# Patient Record
Sex: FEMALE | Race: WHITE | Employment: PART TIME | URBAN - METROPOLITAN AREA
[De-identification: names, ages, dates, MRNs, and addresses within clinical notes are randomized per-mention and may not be internally consistent; named-entity substitution may affect disease eponyms.]

---

## 2017-01-11 ENCOUNTER — OFFICE VISIT (OUTPATIENT)
Dept: FAMILY MEDICINE | Facility: CLINIC | Age: 27
End: 2017-01-11
Payer: COMMERCIAL

## 2017-01-11 VITALS
HEART RATE: 80 BPM | TEMPERATURE: 97.9 F | DIASTOLIC BLOOD PRESSURE: 72 MMHG | BODY MASS INDEX: 20.66 KG/M2 | SYSTOLIC BLOOD PRESSURE: 110 MMHG | WEIGHT: 113 LBS

## 2017-01-11 DIAGNOSIS — Z23 NEED FOR PROPHYLACTIC VACCINATION AND INOCULATION AGAINST INFLUENZA: ICD-10-CM

## 2017-01-11 DIAGNOSIS — L30.9 DERMATITIS: Primary | ICD-10-CM

## 2017-01-11 PROCEDURE — 90471 IMMUNIZATION ADMIN: CPT | Performed by: PHYSICIAN ASSISTANT

## 2017-01-11 PROCEDURE — 99213 OFFICE O/P EST LOW 20 MIN: CPT | Mod: 25 | Performed by: PHYSICIAN ASSISTANT

## 2017-01-11 PROCEDURE — 90686 IIV4 VACC NO PRSV 0.5 ML IM: CPT | Performed by: PHYSICIAN ASSISTANT

## 2017-01-11 RX ORDER — TRIAMCINOLONE ACETONIDE 1 MG/G
CREAM TOPICAL
Qty: 30 G | Refills: 0 | Status: SHIPPED | OUTPATIENT
Start: 2017-01-11 | End: 2017-12-19

## 2017-01-11 ASSESSMENT — PATIENT HEALTH QUESTIONNAIRE - PHQ9: 5. POOR APPETITE OR OVEREATING: SEVERAL DAYS

## 2017-01-11 ASSESSMENT — ANXIETY QUESTIONNAIRES
GAD7 TOTAL SCORE: 7
7. FEELING AFRAID AS IF SOMETHING AWFUL MIGHT HAPPEN: MORE THAN HALF THE DAYS
1. FEELING NERVOUS, ANXIOUS, OR ON EDGE: SEVERAL DAYS
3. WORRYING TOO MUCH ABOUT DIFFERENT THINGS: SEVERAL DAYS
2. NOT BEING ABLE TO STOP OR CONTROL WORRYING: SEVERAL DAYS
5. BEING SO RESTLESS THAT IT IS HARD TO SIT STILL: SEVERAL DAYS
6. BECOMING EASILY ANNOYED OR IRRITABLE: NOT AT ALL

## 2017-01-11 NOTE — PROGRESS NOTES
SUBJECTIVE:                                                    Stacie Daniels is a 26 year old female who presents to clinic today for the following health issues:      x 6  months right side bump on head.   Stacie has a dry patch on the posterior scalp. It has been present x months. No increase in size, no drainage or pain.   She has now developed a lump in the neck also.       Problem list and histories reviewed & adjusted, as indicated.  Additional history: as documented    Patient Active Problem List   Diagnosis     CARDIOVASCULAR SCREENING; LDL GOAL LESS THAN 160     ADHD (attention deficit hyperactivity disorder)     Cervical dysplasia     Supervision of normal first pregnancy     Generalized anxiety disorder     Sciatica     Hyperemesis arising during pregnancy     PTSD (post-traumatic stress disorder)     Panic attacks     Past Surgical History   Procedure Laterality Date     Foreign body removal       from her eye       Social History   Substance Use Topics     Smoking status: Former Smoker     Smokeless tobacco: Never Used      Comment: quit with pregnancy     Alcohol Use: Yes      Comment: socially- quit with pregnancy     Family History   Problem Relation Age of Onset     C.A.D. Father      CANCER Father      lymphoma     Prostate Cancer Father      CANCER Maternal Grandmother      rectal     Depression Maternal Grandmother      Prostate Cancer Maternal Grandfather      Breast Cancer Paternal Grandmother      Hypertension Paternal Grandmother      Depression Paternal Grandmother      Thyroid Disease Paternal Grandmother      Hypertension Paternal Grandfather      Depression Mother      Bipolar Disorder Mother      Depression Sister      Bipolar Disorder Sister      Depression Sister      DIABETES No family hx of      CEREBROVASCULAR DISEASE No family hx of      Glaucoma No family hx of      Macular Degeneration No family hx of      Schizophrenia Maternal Uncle      Suicide Paternal Aunt           Allergies   Allergen Reactions     Augmentin Hives     Avocado Hives and Swelling       ROS:  Constitutional, HEENT, cardiovascular, pulmonary, gi and gu systems are negative, except as otherwise noted.    OBJECTIVE:                                                    /72 mmHg  Pulse 80  Temp(Src) 97.9  F (36.6  C) (Oral)  Wt 113 lb (51.256 kg)  Body mass index is 20.66 kg/(m^2).  GENERAL: healthy, alert and no distress  SKIN: small dry patch on the posterior scalp, there is no drainage or evidence of a cyst / infected follicle. There is a small reactive lymphnode in the posterior neck below the dry patch.   PSYCH: mentation appears normal, affect normal/bright    Diagnostic Test Results:  none      ASSESSMENT/PLAN:                                                      1. Dermatitis  She is going to use the steroid cream for a few weeks. No current signs of infection.   The inflammation is causing the reactive lymphnode.   She will notify if symptoms persist.   - triamcinolone (KENALOG) 0.1 % cream; Apply sparingly to affected area three times daily for 14 days.  Dispense: 30 g; Refill: 0    2. Need for prophylactic vaccination and inoculation against influenza  - FLU VAC, SPLIT VIRUS IM > 3 YO (QUADRIVALENT) [80104]  - Vaccine Administration, Initial [92084]      Kristen M. Kehr, PA-C  [facility]  Injectable Influenza Immunization Documentation    1.  Is the person to be vaccinated sick today?  No    2. Does the person to be vaccinated have an allergy to eggs or to a component of the vaccine?  No    3. Has the person to be vaccinated today ever had a serious reaction to influenza vaccine in the past?  No    4. Has the person to be vaccinated ever had Guillain-Crowder syndrome?  No     Form completed by BRII Norman MA

## 2017-01-11 NOTE — MR AVS SNAPSHOT
After Visit Summary   1/11/2017    Stacie Daniels    MRN: [id number]           Patient Information     Date Of Birth          1990        Visit Information        Provider Department      1/11/2017 10:10 AM Kehr, Kristen M, PA-C [facility]        Today's Diagnoses     Need for prophylactic vaccination and inoculation against influenza    -  1     Rash and nonspecific skin eruption            Follow-ups after your visit        Additional Services     DERMATOLOGY REFERRAL       Your provider has referred you to: [facility]: [facility] [phone]   [email or web address]  [facility] [phone]   [email or web address]  [facility] ( locations) [phone]   [email or web address]    Please be aware that coverage of these services is subject to the terms and limitations of your health insurance plan.  Call member services at your health plan with any benefit or coverage questions.      Please bring the following with you to your appointment:    (1) Any X-Rays, CTs or MRIs which have been performed.  Contact the facility where they were done to arrange for  prior to your scheduled appointment.    (2) List of current medications  (3) This referral request   (4) Any documents/labs given to you for this referral                  Who to contact     If you have questions or need follow up information about today's clinic visit or your schedule please contact [facility] directly at [phone].  Normal or non-critical lab and imaging results will be communicated to you by MyChart, letter or phone within 4 business days after the clinic has received the results. If you do not hear from us within 7 days, please contact the clinic through MyChart or phone. If you have a critical or abnormal lab result, we will notify you by phone as  soon as possible.  Submit refill requests through [employer] or call your pharmacy and they will forward the refill request to us. Please allow 3 business days for your refill to be completed.          Additional Information About Your Visit        [employer] Information     [employer] gives you secure access to your electronic health record. If you see a primary care provider, you can also send messages to your care team and make appointments. If you have questions, please call your primary care clinic.  If you do not have a primary care provider, please call [phone] and they will assist you.        Care EveryWhere ID     This is your Care EveryWhere ID. This could be used by other organizations to access your [place] medical records  [id number]        Your Vitals Were     Pulse Temperature                80 97.9  F (36.6  C) (Oral)           Blood Pressure from Last 3 Encounters:   01/11/17 110/72   04/09/16 104/66   04/07/15 117/73    Weight from Last 3 Encounters:   01/11/17 113 lb (51.256 kg)   04/09/16 110 lb (49.896 kg)   04/07/15 100 lb (45.36 kg)              We Performed the Following     DEPRESSION ACTION PLAN (DAP) Order [[id number]]     DERMATOLOGY REFERRAL     FLU VAC, SPLIT VIRUS IM > 3 YO (QUADRIVALENT) [73325]     Vaccine Administration, Initial [80193]          Today's Medication Changes          These changes are accurate as of: 1/11/17 10:40 AM.  If you have any questions, ask your nurse or doctor.               Start taking these medicines.        Dose/Directions    triamcinolone 0.1 % cream   Commonly known as:  KENALOG   Used for:  Rash and nonspecific skin eruption   Started by:  Kehr, Kristen M, PA-C        Apply sparingly to affected area three times daily for 14 days.   Quantity:  30 g   Refills:  0            Where to get your medicines      These medications were sent to [facility] [place], [place] - [address] [facility] [address], [address]  [address], [address], [facility] [address]      Phone:  [phone]    - triamcinolone 0.1 % cream             Primary Care Provider Office Phone # Fax #    Gerry Barcenas -[phone]       [facility] [address] [facility] [address]        Thank you!     Thank you for choosing [facility]  for your care. Our goal is always to provide you with excellent care. Hearing back from our patients is one way we can continue to improve our services. Please take a few minutes to complete the written survey that you may receive in the mail after your visit with us. Thank you!             Your Updated Medication List - Protect others around you: Learn how to safely use, store and throw away your medicines at www.disposemymeds.org.          This list is accurate as of: 1/11/17 10:40 AM.  Always use your most recent med list.                   Brand Name Dispense Instructions for use    triamcinolone 0.1 % cream    KENALOG    30 g    Apply sparingly to affected area three times daily for 14 days.

## 2017-01-11 NOTE — NURSING NOTE
"Chief Complaint   Patient presents with     Mass     x 6  months right side bump on head.        Initial /72 mmHg  Pulse 80  Temp(Src) 97.9  F (36.6  C) (Oral)  Wt 113 lb (51.256 kg) Estimated body mass index is 20.66 kg/(m^2) as calculated from the following:    Height as of 6/13/14: 5' 2.01\" (1.575 m).    Weight as of this encounter: 113 lb (51.256 kg)..  BP completed using cuff size: shruthi WEINBERG MA    "

## 2017-01-12 ASSESSMENT — PATIENT HEALTH QUESTIONNAIRE - PHQ9: SUM OF ALL RESPONSES TO PHQ QUESTIONS 1-9: 7

## 2017-01-12 ASSESSMENT — ANXIETY QUESTIONNAIRES: GAD7 TOTAL SCORE: 7

## 2017-01-25 ENCOUNTER — OFFICE VISIT (OUTPATIENT)
Dept: OPTOMETRY | Facility: CLINIC | Age: 27
End: 2017-01-25
Payer: COMMERCIAL

## 2017-01-25 DIAGNOSIS — H52.13 MYOPIA OF BOTH EYES: Primary | ICD-10-CM

## 2017-01-25 DIAGNOSIS — H52.203 ASTIGMATISM OF BOTH EYES: ICD-10-CM

## 2017-01-25 PROCEDURE — 92004 COMPRE OPH EXAM NEW PT 1/>: CPT | Performed by: OPTOMETRIST

## 2017-01-25 PROCEDURE — 92015 DETERMINE REFRACTIVE STATE: CPT | Performed by: OPTOMETRIST

## 2017-01-25 ASSESSMENT — REFRACTION_MANIFEST
OS_CYLINDER: +0.50
OS_SPHERE: -1.75
OD_CYLINDER: +0.50
OS_AXIS: 140
OD_AXIS: 19
OD_CYLINDER: +0.50
OD_AXIS: 020
METHOD_AUTOREFRACTION: 1
OD_SPHERE: -1.75
OS_SPHERE: -1.75
OS_AXIS: 126
OS_CYLINDER: +0.25
OD_SPHERE: -1.50

## 2017-01-25 ASSESSMENT — KERATOMETRY
OS_K2POWER_DIOPTERS: 45.25
OD_K1POWER_DIOPTERS: 45.00
OS_K1POWER_DIOPTERS: 44.75
OD_AXISANGLE2_DEGREES: 143
OD_K2POWER_DIOPTERS: 45.25
OS_AXISANGLE2_DEGREES: 5

## 2017-01-25 ASSESSMENT — CONF VISUAL FIELD
OD_NORMAL: 1
OS_NORMAL: 1

## 2017-01-25 ASSESSMENT — VISUAL ACUITY
OD_SC: 20/60
OD_SC+: -3
OS_PH_SC: 20/30-1
OD_SC: 20/20
OS_SC: 20/20
OD_PH_SC: 20/25-1
OS_SC+: -2
METHOD: SNELLEN - LINEAR
OS_SC: 20/60

## 2017-01-25 ASSESSMENT — CUP TO DISC RATIO
OS_RATIO: 0.3
OD_RATIO: 0.3

## 2017-01-25 ASSESSMENT — EXTERNAL EXAM - RIGHT EYE: OD_EXAM: NORMAL

## 2017-01-25 ASSESSMENT — EXTERNAL EXAM - LEFT EYE: OS_EXAM: NORMAL

## 2017-01-25 ASSESSMENT — TONOMETRY
IOP_METHOD: APPLANATION
OS_IOP_MMHG: 14
OD_IOP_MMHG: 14

## 2017-01-25 ASSESSMENT — SLIT LAMP EXAM - LIDS
COMMENTS: NORMAL
COMMENTS: NORMAL

## 2017-01-25 NOTE — PATIENT INSTRUCTIONS
Patient was advised of today's exam findings.  Fill glasses prescription  Return in 1 year for eye exam    Yumi Tran O.D.  [facility]   [address] [place], [place] [id number] [phone]

## 2017-01-25 NOTE — PROGRESS NOTES
Chief Complaint   Patient presents with     COMPREHENSIVE EYE EXAM     Needs new glasses          Last Eye Exam: 10/21/13  Dilated Previously: Yes    What are you currently using to see?  Patient has glasses that she wears when she drives. They are broken and cracked so she doesn't really wear them out.        Distance Vision Acuity: Vision isn't good in the distance, needs some glasses. Trouble driving at night.    Near Vision Acuity: Satisfied with vision while reading and using computer unaided    Eye Comfort: good  Do you use eye drops? : No  Occupation or Hobbies: , just graduated from [employer] in Sept, does make up for weddings    Bianca Apple Optometric Assistant          Medical, surgical and family histories reviewed and updated 1/25/2017.       OBJECTIVE: See Ophthalmology exam    ASSESSMENT:    ICD-10-CM    1. Myopia of both eyes H52.13    2. Astigmatism of both eyes H52.203       PLAN:     Patient Instructions   Patient was advised of today's exam findings.  Fill glasses prescription  Return in 1 year for eye exam    Yumi Tran O.D.  [facility]   [address] [place], [place] [id number] [phone]

## 2017-01-25 NOTE — MR AVS SNAPSHOT
After Visit Summary   1/25/2017    Stacie Daniels    MRN: [id number]           Patient Information     Date Of Birth          1990        Visit Information        Provider Department      1/25/2017 10:30 AM Yumi Tran OD [facility]        Today's Diagnoses     Myopia of both eyes    -  1     Astigmatism of both eyes           Care Instructions    Patient was advised of today's exam findings.  Fill glasses prescription  Return in 1 year for eye exam    Yuim Tran O.D.  [facility]   [address] [place] [address]  [phone]            Follow-ups after your visit        Who to contact     If you have questions or need follow up information about today's clinic visit or your schedule please contact [facility] directly at [phone].  Normal or non-critical lab and imaging results will be communicated to you by MyChart, letter or phone within 4 business days after the clinic has received the results. If you do not hear from us within 7 days, please contact the clinic through MyChart or phone. If you have a critical or abnormal lab result, we will notify you by phone as soon as possible.  Submit refill requests through [email or web address] or call your pharmacy and they will forward the refill request to us. Please allow 3 business days for your refill to be completed.          Additional Information About Your Visit        MyChart Information     [email or web address] gives you secure access to your electronic health record. If you see a primary care provider, you can also send messages to your care team and make appointments. If you have questions, please call your primary care clinic.  If you do not have a primary care provider, please call [phone] and they will assist you.        Care EveryWhere ID     This is your Care EveryWhere ID. This could be used by other organizations to access your [place] medical records  [id number]         Blood Pressure from  Last 3 Encounters:   01/11/17 110/72   04/09/16 104/66   04/07/15 117/73    Weight from Last 3 Encounters:   01/11/17 51.256 kg (113 lb)   04/09/16 49.896 kg (110 lb)   04/07/15 45.36 kg (100 lb)              Today, you had the following     No orders found for display       Primary Care Provider Office Phone # Fax #    Gerry Barcenas [phone]       [facility] [address] [facility] [address]        Thank you!     Thank you for choosing [facility]  for your care. Our goal is always to provide you with excellent care. Hearing back from our patients is one way we can continue to improve our services. Please take a few minutes to complete the written survey that you may receive in the mail after your visit with us. Thank you!             Your Updated Medication List - Protect others around you: Learn how to safely use, store and throw away your medicines at www.disposemymeds.org.          This list is accurate as of: 1/25/17 11:20 AM.  Always use your most recent med list.                   Brand Name Dispense Instructions for use    triamcinolone 0.1 % cream    KENALOG    30 g    Apply sparingly to affected area three times daily for 14 days.

## 2017-03-15 ENCOUNTER — TELEPHONE (OUTPATIENT)
Dept: FAMILY MEDICINE | Facility: CLINIC | Age: 27
End: 2017-03-15

## 2017-03-15 NOTE — TELEPHONE ENCOUNTER
Panel Management Review      Patient has the following on her problem list: None      Composite cancer screening  Chart review shows that this patient is due/due soon for the following Pap Smear  Summary:    Patient is due/failing the following:   PAP and PHYSICAL    Action needed:   Patient needs office visit for physical/pap.    Type of outreach:    Sent [employer] message.    Questions for provider review:    None                                                                                                                                    BRII Norman MA       Chart routed to close .

## 2017-07-29 ENCOUNTER — HEALTH MAINTENANCE LETTER (OUTPATIENT)
Age: 27
End: 2017-07-29

## 2017-08-12 ENCOUNTER — TRANSFERRED RECORDS (OUTPATIENT)
Dept: HEALTH INFORMATION MANAGEMENT | Facility: CLINIC | Age: 27
End: 2017-08-12

## 2017-12-18 NOTE — PROGRESS NOTES
SUBJECTIVE:                                                    Stacie Daniels is a 27 year old female who presents to clinic today for the following health issues:    Eye(s) Problem  Onset: x 5-6 months    Description:   Location: Both eyes  Pain: NONE  Redness: no    Accompanying Signs & Symptoms:  Discharge/mattering: no  Swelling: no  Visual changes: YES- Blurry vision from not wearing her prescribed glasses  Fever: no  Nasal Congestion: no  Bothered by bright lights: YES- little    History:   Trauma: no   Foreign body exposure: no    Precipitating factors:   Wearing contacts: no    Alleviating factors:  Improved by: None    Therapies Tried and outcome: none      Mom is a neuro-nurse and has noticed her eyes twitching. Horizontal. Bilateral. Pretty constant per patient. Patient can feel her eyes twitching she thinks.   Worse when trying to focus.  Has had dizziness off and on, describes as vasovagal or orthostatic never vertigo.  Never has LOC.  Usually is when she stands up from sitting (orthostatic).  Has not had labs in years.  No tinnitis. No hearing changes or hearing loss.   Last eye doctor visit was almost a year ago. This started after that visit. Patient has need for glasses but does not wear because she does not like the look of them. Wears glasses for driving only, needs them for distance mainll. Eyes still twitch when wearing glasses though she feels.  Never worn contacts.  Never had lasix.  Had glitter removed when younger from left eye, otherwise no procedures to eye.   No headaches or weakness or numbness/tingling. No rashes.     Pt is due for PAP, will schedule before she leaves today with fasting labs (has not had labs in years). H/o anemia with pregnancy.       Problem list and histories reviewed & adjusted, as indicated.  Additional history: as documented    Patient Active Problem List   Diagnosis     CARDIOVASCULAR SCREENING; LDL GOAL LESS THAN 160     ADHD (attention deficit hyperactivity  "disorder)     Cervical dysplasia     Supervision of normal first pregnancy     Generalized anxiety disorder     Sciatica     Hyperemesis arising during pregnancy     PTSD (post-traumatic stress disorder)     Panic attacks     Past Surgical History:   Procedure Laterality Date     FOREIGN BODY REMOVAL      from her eye       Social History   Substance Use Topics     Smoking status: Former Smoker     Smokeless tobacco: Never Used      Comment: quit with pregnancy     Alcohol use Yes      Comment: socially- quit with pregnancy     Family History   Problem Relation Age of Onset     C.A.D. Father      CANCER Father      lymphoma     Prostate Cancer Father      CANCER Maternal Grandmother      rectal     Depression Maternal Grandmother      Prostate Cancer Maternal Grandfather      Breast Cancer Paternal Grandmother      Hypertension Paternal Grandmother      Depression Paternal Grandmother      Thyroid Disease Paternal Grandmother      Hypertension Paternal Grandfather      Depression Mother      Bipolar Disorder Mother      Depression Sister      Bipolar Disorder Sister      Depression Sister      DIABETES No family hx of      CEREBROVASCULAR DISEASE No family hx of      Glaucoma No family hx of      Macular Degeneration No family hx of      Schizophrenia Maternal Uncle      Suicide Paternal Aunt          No current outpatient prescriptions on file.     Allergies   Allergen Reactions     Augmentin Hives     Avocado Hives and Swelling         ROS:  Constitutional, HEENT, cardiovascular, pulmonary, GI, , musculoskeletal, neuro, skin, endocrine and psych systems are negative, except as otherwise noted.      OBJECTIVE:   /71  Pulse 78  Temp 97.9  F (36.6  C) (Oral)  Ht 5' 2\" (1.575 m)  Wt 116 lb (52.6 kg)  SpO2 100%  Breastfeeding? No  BMI 21.22 kg/m2  Body mass index is 21.22 kg/(m^2).  GENERAL: healthy, alert and no distress  EYES: Eyes grossly normal to inspection, PERRL and conjunctivae and sclerae " normal. No nystagmus appreciated.   HENT: ear canals and TM's normal, nose and mouth without ulcers or lesions  NECK: no adenopathy, no asymmetry, masses, or scars and thyroid normal to palpation  RESP: lungs clear to auscultation - no rales, rhonchi or wheezes  CV: regular rate and rhythm, normal S1 S2, no S3 or S4, no murmur, click or rub, no peripheral edema and peripheral pulses strong  MS: no gross musculoskeletal defects noted, no edema  SKIN: no suspicious lesions or rashes  NEURO: Normal strength and tone, sensory exam grossly normal, mentation intact, cranial nerves 2-12 intact, DTR's normal and symmetric , gait normal including heel/toe/tandem walking, Romberg normal and rapid alternating movements normal  PSYCH: mentation appears normal, affect normal/bright        ASSESSMENT/PLAN:     ASSESSMENT / PLAN:  (Z23) Need for prophylactic vaccination and inoculation against influenza  (primary encounter diagnosis)  Comment:   Plan: FLU VAC, SPLIT VIRUS IM > 3 YO (QUADRIVALENT)         [49543], Vaccine Administration, Initial         [42767]            (H55.09) Horizontal nystagmus  Comment:  I DO NOT APPRECIATE ANY ON EXAM, will refer. Could be eye fatigue also, she should wear her glasses. Will refer.   Plan: OPHTHALMOLOGY ADULT REFERRAL, CBC with         platelets differential, TSH with free T4 reflex            (Z13.1) Screening for diabetes mellitus  Comment:   Plan: Comprehensive metabolic panel            (Z13.220) Lipid screening  Comment:   Plan: Lipid panel reflex to direct LDL Fasting            Patient Instructions   No food or drink other than water for 8 hours, return fasting for lab only appointment    Schedule with eye doctor    I will follow up with labwork          Arlin Vizcaino PA-C  [facility]    Injectable Influenza Immunization Documentation    1.  Is the person to be vaccinated sick today?   No    2. Does the person to be vaccinated have an allergy to a component    of the vaccine?   No  Egg Allergy Algorithm Link    3. Has the person to be vaccinated ever had a serious reaction   to influenza vaccine in the past?   No    4. Has the person to be vaccinated ever had Guillain-Barré syndrome?   No    Form completed by Luz Maria Loera MA

## 2017-12-19 ENCOUNTER — OFFICE VISIT (OUTPATIENT)
Dept: FAMILY MEDICINE | Facility: CLINIC | Age: 27
End: 2017-12-19
Payer: COMMERCIAL

## 2017-12-19 VITALS
SYSTOLIC BLOOD PRESSURE: 118 MMHG | BODY MASS INDEX: 21.35 KG/M2 | HEART RATE: 78 BPM | DIASTOLIC BLOOD PRESSURE: 71 MMHG | HEIGHT: 62 IN | WEIGHT: 116 LBS | TEMPERATURE: 97.9 F | OXYGEN SATURATION: 100 %

## 2017-12-19 DIAGNOSIS — Z23 NEED FOR PROPHYLACTIC VACCINATION AND INOCULATION AGAINST INFLUENZA: ICD-10-CM

## 2017-12-19 DIAGNOSIS — Z13.1 SCREENING FOR DIABETES MELLITUS: ICD-10-CM

## 2017-12-19 DIAGNOSIS — H55.09 HORIZONTAL NYSTAGMUS: Primary | ICD-10-CM

## 2017-12-19 DIAGNOSIS — Z13.220 LIPID SCREENING: ICD-10-CM

## 2017-12-19 PROCEDURE — 99214 OFFICE O/P EST MOD 30 MIN: CPT | Mod: 25 | Performed by: PHYSICIAN ASSISTANT

## 2017-12-19 PROCEDURE — 90686 IIV4 VACC NO PRSV 0.5 ML IM: CPT | Performed by: PHYSICIAN ASSISTANT

## 2017-12-19 PROCEDURE — 90471 IMMUNIZATION ADMIN: CPT | Performed by: PHYSICIAN ASSISTANT

## 2017-12-19 ASSESSMENT — ANXIETY QUESTIONNAIRES
3. WORRYING TOO MUCH ABOUT DIFFERENT THINGS: MORE THAN HALF THE DAYS
1. FEELING NERVOUS, ANXIOUS, OR ON EDGE: MORE THAN HALF THE DAYS
6. BECOMING EASILY ANNOYED OR IRRITABLE: MORE THAN HALF THE DAYS
GAD7 TOTAL SCORE: 12
7. FEELING AFRAID AS IF SOMETHING AWFUL MIGHT HAPPEN: NOT AT ALL
IF YOU CHECKED OFF ANY PROBLEMS ON THIS QUESTIONNAIRE, HOW DIFFICULT HAVE THESE PROBLEMS MADE IT FOR YOU TO DO YOUR WORK, TAKE CARE OF THINGS AT HOME, OR GET ALONG WITH OTHER PEOPLE: SOMEWHAT DIFFICULT
2. NOT BEING ABLE TO STOP OR CONTROL WORRYING: MORE THAN HALF THE DAYS
5. BEING SO RESTLESS THAT IT IS HARD TO SIT STILL: MORE THAN HALF THE DAYS

## 2017-12-19 ASSESSMENT — PATIENT HEALTH QUESTIONNAIRE - PHQ9
5. POOR APPETITE OR OVEREATING: MORE THAN HALF THE DAYS
SUM OF ALL RESPONSES TO PHQ QUESTIONS 1-9: 13

## 2017-12-19 NOTE — MR AVS SNAPSHOT
After Visit Summary   12/19/2017    Stacie Daniels    MRN: [id number]           Patient Information     Date Of Birth          1990        Visit Information        Provider Department      12/19/2017 3:20 PM Arlin Vizcaino PA-C [facility]        Today's Diagnoses     Need for prophylactic vaccination and inoculation against influenza    -  1    Horizontal nystagmus        Screening for diabetes mellitus        Lipid screening          Care Instructions    No food or drink other than water for 8 hours, return fasting for lab only appointment    Schedule with eye doctor    I will follow up with labwork              Follow-ups after your visit        Additional Services     OPHTHALMOLOGY ADULT REFERRAL       Your provider has referred you to: [facility]: [facility] [phone]   [email or web address]    Please be aware that coverage of these services is subject to the terms and limitations of your health insurance plan.  Call member services at your health plan with any benefit or coverage questions.      Please bring the following with you to your appointment:    (1) Any X-Rays, CTs or MRIs which have been performed.  Contact the facility where they were done to arrange for  prior to your scheduled appointment.    (2) List of current medications  (3) This referral request   (4) Any documents/labs given to you for this referral                  Future tests that were ordered for you today     Open Future Orders        Priority Expected Expires Ordered    CBC with platelets differential Routine  12/19/2018 12/19/2017    Comprehensive metabolic panel Routine  12/19/2018 12/19/2017    TSH with free T4 reflex Routine  12/19/2018 12/19/2017    Lipid panel reflex to direct LDL Fasting Routine  12/19/2018 12/19/2017            Who to contact     If you have questions or need follow up information about today's clinic visit or your  "schedule please contact [facility] directly at [phone].  Normal or non-critical lab and imaging results will be communicated to you by MyChart, letter or phone within 4 business days after the clinic has received the results. If you do not hear from us within 7 days, please contact the clinic through [employer] or phone. If you have a critical or abnormal lab result, we will notify you by phone as soon as possible.  Submit refill requests through [employer] or call your pharmacy and they will forward the refill request to us. Please allow 3 business days for your refill to be completed.          Additional Information About Your Visit        [employer] Information     [employer] gives you secure access to your electronic health record. If you see a primary care provider, you can also send messages to your care team and make appointments. If you have questions, please call your primary care clinic.  If you do not have a primary care provider, please call [phone] and they will assist you.        Care EveryWhere ID     This is your Care EveryWhere ID. This could be used by other organizations to access your [place] medical records  [id number]        Your Vitals Were     Pulse Temperature Height Pulse Oximetry Breastfeeding? BMI (Body Mass Index)    78 97.9  F (36.6  C) (Oral) 5' 2\" (1.575 m) 100% No 21.22 kg/m2       Blood Pressure from Last 3 Encounters:   12/19/17 118/71   01/11/17 110/72   04/09/16 104/66    Weight from Last 3 Encounters:   12/19/17 116 lb (52.6 kg)   01/11/17 113 lb (51.3 kg)   04/09/16 110 lb (49.9 kg)              We Performed the Following     DEPRESSION ACTION PLAN (DAP)     FLU VAC, SPLIT VIRUS IM > 3 YO (QUADRIVALENT) [63531]     OPHTHALMOLOGY ADULT REFERRAL     Vaccine Administration, Initial [[id number]]        Primary Care Provider Office Phone # Fax #    Gerry Barcenas [phone] [address] [address] [facility] [address]        Equal Access to Services  "    MAYNOR GOLDEN : Hadii aad rosie jasmina Interiano, wapaulineda luqadaha, qaybta kaalmada lashajaydonkaushal, margaux mullinskariisabela estrada. So [facility] [phone].    ATENCIÓN: Si habla español, tiene a donovan disposición servicios gratuitos de asistencia lingüística. Llame al [phone].    We comply with applicable federal civil rights laws and [place] laws. We do not discriminate on the basis of race, color, national origin, age, disability, sex, sexual orientation, or gender identity.            Thank you!     Thank you for [facility]  for your [facility]. Our goal is always to provide you with excellent care. Hearing back from our patients is one way we can continue to improve our services. Please take a few minutes to complete the written survey that you may receive in the mail after your visit with us. Thank you!             Your Updated Medication List - Protect others around you: Learn how to safely use, store and throw away your medicines at www.disposemymeds.org.      Notice  As of 12/19/2017  3:49 PM    You have not been prescribed any medications.

## 2017-12-19 NOTE — PATIENT INSTRUCTIONS
No food or drink other than water for 8 hours, return fasting for lab only appointment    Schedule with eye doctor    I will follow up with labwork

## 2017-12-19 NOTE — NURSING NOTE
"Chief Complaint   Patient presents with     Eye Problem     Both eye issue per pt x 5-6 months now possible nystagmus       Initial /71  Pulse 78  Temp 97.9  F (36.6  C) (Oral)  Ht 5' 2\" (1.575 m)  Wt 116 lb (52.6 kg)  SpO2 100%  Breastfeeding? No  BMI 21.22 kg/m2 Estimated body mass index is 21.22 kg/(m^2) as calculated from the following:    Height as of this encounter: 5' 2\" (1.575 m).    Weight as of this encounter: 116 lb (52.6 kg).  Medication Reconciliation: complete      LuzM aria Loera MA      "

## 2017-12-20 ASSESSMENT — ANXIETY QUESTIONNAIRES: GAD7 TOTAL SCORE: 12

## 2017-12-21 NOTE — PATIENT INSTRUCTIONS
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
## 2018-01-02 ENCOUNTER — OFFICE VISIT (OUTPATIENT)
Dept: FAMILY MEDICINE | Facility: CLINIC | Age: 28
End: 2018-01-02
Payer: COMMERCIAL

## 2018-01-02 VITALS
HEART RATE: 53 BPM | BODY MASS INDEX: 20.12 KG/M2 | TEMPERATURE: 97.2 F | WEIGHT: 110 LBS | SYSTOLIC BLOOD PRESSURE: 107 MMHG | DIASTOLIC BLOOD PRESSURE: 63 MMHG | OXYGEN SATURATION: 99 %

## 2018-01-02 DIAGNOSIS — R11.0 NAUSEA: ICD-10-CM

## 2018-01-02 DIAGNOSIS — H55.09 HORIZONTAL NYSTAGMUS: ICD-10-CM

## 2018-01-02 DIAGNOSIS — Z00.129 ENCOUNTER FOR ROUTINE CHILD HEALTH EXAMINATION WITHOUT ABNORMAL FINDINGS: Primary | ICD-10-CM

## 2018-01-02 DIAGNOSIS — Z13.220 LIPID SCREENING: ICD-10-CM

## 2018-01-02 DIAGNOSIS — Z13.1 SCREENING FOR DIABETES MELLITUS: ICD-10-CM

## 2018-01-02 LAB
ALBUMIN SERPL-MCNC: 4.1 G/DL (ref 3.4–5)
ALP SERPL-CCNC: 49 U/L (ref 40–150)
ALT SERPL W P-5'-P-CCNC: 17 U/L (ref 0–50)
ANION GAP SERPL CALCULATED.3IONS-SCNC: 11 MMOL/L (ref 3–14)
AST SERPL W P-5'-P-CCNC: 17 U/L (ref 0–45)
BASOPHILS # BLD AUTO: 0 10E9/L (ref 0–0.2)
BASOPHILS NFR BLD AUTO: 0.2 %
BILIRUB SERPL-MCNC: 0.3 MG/DL (ref 0.2–1.3)
BUN SERPL-MCNC: 12 MG/DL (ref 7–30)
CALCIUM SERPL-MCNC: 9 MG/DL (ref 8.5–10.1)
CHLORIDE SERPL-SCNC: 104 MMOL/L (ref 94–109)
CHOLEST SERPL-MCNC: 155 MG/DL
CO2 SERPL-SCNC: 24 MMOL/L (ref 20–32)
CREAT SERPL-MCNC: 0.83 MG/DL (ref 0.52–1.04)
DIFFERENTIAL METHOD BLD: NORMAL
EOSINOPHIL # BLD AUTO: 0.2 10E9/L (ref 0–0.7)
EOSINOPHIL NFR BLD AUTO: 2.6 %
ERYTHROCYTE [DISTWIDTH] IN BLOOD BY AUTOMATED COUNT: 12.1 % (ref 10–15)
ESTRADIOL SERPL-MCNC: 31 PG/ML
FSH SERPL-ACNC: 9.7 IU/L
GFR SERPL CREATININE-BSD FRML MDRD: 82 ML/MIN/1.7M2
GLUCOSE SERPL-MCNC: 77 MG/DL (ref 70–99)
HCT VFR BLD AUTO: 42.8 % (ref 35–47)
HDLC SERPL-MCNC: 44 MG/DL
HGB BLD-MCNC: 14.1 G/DL (ref 11.7–15.7)
LDLC SERPL CALC-MCNC: 88 MG/DL
LYMPHOCYTES # BLD AUTO: 1.9 10E9/L (ref 0.8–5.3)
LYMPHOCYTES NFR BLD AUTO: 33.2 %
MCH RBC QN AUTO: 30.9 PG (ref 26.5–33)
MCHC RBC AUTO-ENTMCNC: 32.9 G/DL (ref 31.5–36.5)
MCV RBC AUTO: 94 FL (ref 78–100)
MONOCYTES # BLD AUTO: 0.4 10E9/L (ref 0–1.3)
MONOCYTES NFR BLD AUTO: 7.3 %
NEUTROPHILS # BLD AUTO: 3.3 10E9/L (ref 1.6–8.3)
NEUTROPHILS NFR BLD AUTO: 56.7 %
NONHDLC SERPL-MCNC: 111 MG/DL
PLATELET # BLD AUTO: 169 10E9/L (ref 150–450)
POTASSIUM SERPL-SCNC: 4.3 MMOL/L (ref 3.4–5.3)
PROGEST SERPL-MCNC: 0.4 NG/ML
PROT SERPL-MCNC: 7.4 G/DL (ref 6.8–8.8)
RBC # BLD AUTO: 4.56 10E12/L (ref 3.8–5.2)
SODIUM SERPL-SCNC: 139 MMOL/L (ref 133–144)
TRIGL SERPL-MCNC: 117 MG/DL
TSH SERPL DL<=0.005 MIU/L-ACNC: 1.04 MU/L (ref 0.4–4)
WBC # BLD AUTO: 5.8 10E9/L (ref 4–11)

## 2018-01-02 PROCEDURE — 84144 ASSAY OF PROGESTERONE: CPT | Performed by: PHYSICIAN ASSISTANT

## 2018-01-02 PROCEDURE — 83001 ASSAY OF GONADOTROPIN (FSH): CPT | Performed by: PHYSICIAN ASSISTANT

## 2018-01-02 PROCEDURE — 80050 GENERAL HEALTH PANEL: CPT | Performed by: PHYSICIAN ASSISTANT

## 2018-01-02 PROCEDURE — 80061 LIPID PANEL: CPT | Performed by: PHYSICIAN ASSISTANT

## 2018-01-02 PROCEDURE — 99395 PREV VISIT EST AGE 18-39: CPT | Mod: 25 | Performed by: PHYSICIAN ASSISTANT

## 2018-01-02 PROCEDURE — 36415 COLL VENOUS BLD VENIPUNCTURE: CPT | Performed by: PHYSICIAN ASSISTANT

## 2018-01-02 PROCEDURE — 82670 ASSAY OF TOTAL ESTRADIOL: CPT | Performed by: PHYSICIAN ASSISTANT

## 2018-01-02 NOTE — NURSING NOTE
"Chief Complaint   Patient presents with     Physical     AFE, Pt will be fasting        Initial /63  Pulse 53  Temp 97.2  F (36.2  C) (Oral)  Wt 110 lb (49.9 kg)  SpO2 99%  Breastfeeding? No  BMI 20.12 kg/m2 Estimated body mass index is 20.12 kg/(m^2) as calculated from the following:    Height as of 12/19/17: 5' 2\" (1.575 m).    Weight as of this encounter: 110 lb (49.9 kg).  Medication Reconciliation: complete      Luz Maria Loera MA    "

## 2018-01-02 NOTE — PROGRESS NOTES
SUBJECTIVE:                                                    Stacie Daniels is a 27 year old female who presents to clinic today for the following health issues:    PAP only.  Had period at her physical. No concerns. Would like to go over lab results as she doesn't use mychart, we will deactivate her account.   Declined std screening.           Problem list and histories reviewed & adjusted, as indicated.  Additional history: as documented    Patient Active Problem List   Diagnosis     CARDIOVASCULAR SCREENING; LDL GOAL LESS THAN 160     ADHD (attention deficit hyperactivity disorder)     Cervical dysplasia     Supervision of normal first pregnancy     Generalized anxiety disorder     Sciatica     Hyperemesis arising during pregnancy     PTSD (post-traumatic stress disorder)     Panic attacks     Past Surgical History:   Procedure Laterality Date     FOREIGN BODY REMOVAL      from her eye       Social History   Substance Use Topics     Smoking status: Former Smoker     Smokeless tobacco: Never Used      Comment: quit with pregnancy     Alcohol use Yes      Comment: socially- quit with pregnancy     Family History   Problem Relation Age of Onset     C.A.D. Father      CANCER Father      lymphoma     Prostate Cancer Father      CANCER Maternal Grandmother      rectal     Depression Maternal Grandmother      Prostate Cancer Maternal Grandfather      Breast Cancer Paternal Grandmother      Hypertension Paternal Grandmother      Depression Paternal Grandmother      Thyroid Disease Paternal Grandmother      Hypertension Paternal Grandfather      Depression Mother      Bipolar Disorder Mother      Depression Sister      Bipolar Disorder Sister      Depression Sister      DIABETES No family hx of      CEREBROVASCULAR DISEASE No family hx of      Glaucoma No family hx of      Macular Degeneration No family hx of      Schizophrenia Maternal Uncle      Suicide Paternal Aunt          No current outpatient prescriptions on  "file.     Allergies   Allergen Reactions     Augmentin Hives     Avocado Hives and Swelling         ROS:  Constitutional, HEENT, cardiovascular, pulmonary, gi and gu systems are negative, except as otherwise noted.      OBJECTIVE:   /75  Pulse 78  Temp 100.5  F (38.1  C) (Oral)  Ht 5' 2\" (1.575 m)  Wt 110 lb (49.9 kg)  LMP 12/30/2017 (Approximate)  SpO2 100%  Breastfeeding? No  BMI 20.12 kg/m2  Body mass index is 20.12 kg/(m^2).  GENERAL: healthy, alert and no distress  RESP: lungs clear to auscultation - no rales, rhonchi or wheezes   (female): normal female external genitalia, normal urethral meatus, vaginal mucosa, normal cervix/adnexa/uterus without masses or discharge  MS: no gross musculoskeletal defects noted, no edema  PSYCH: mentation appears normal, affect normal/bright      ASSESSMENT/PLAN:     ASSESSMENT / PLAN:  (Z12.4) Screening for malignant neoplasm of cervix  (primary encounter diagnosis)  Comment:   Plan: Pap imaged thin layer screen reflex to HPV if         ASCUS - recommend age 25 - 29            Went over labs    Billing: 15 min spent face-to-face with patient. 10 minutes going over lab results and answering questions, 5 on exam.     Arlin Vizcaino PA-C  [facility]    "

## 2018-01-02 NOTE — MR AVS SNAPSHOT
After Visit Summary   1/2/2018    Stacie Daniels    MRN: [id number]           Patient Information     Date Of Birth          1990        Visit Information        Provider Department      1/2/2018 11:00 AM Arlin Vizcaino PA-C [facility]        Today's Diagnoses     Screening for diabetes mellitus    -  1    Lipid screening        Nausea          Care Instructions      Preventive Health Recommendations  Female Ages 26 - 39  Yearly exam:   See your health care provider every year in order to    Review health changes.     Discuss preventive care.      Review your medicines if you your doctor has prescribed any.    Until age 30: Get a Pap test every three years (more often if you have had an abnormal result).    After age 30: Talk to your doctor about whether you should have a Pap test every 3 years or have a Pap test with HPV screening every 5 years.   You do not need a Pap test if your uterus was removed (hysterectomy) and you have not had cancer.  You should be tested each year for STDs (sexually transmitted diseases), if you're at risk.   Talk to your provider about how often to have your cholesterol checked.  If you are at risk for diabetes, you should have a diabetes test (fasting glucose).  Shots: Get a flu shot each year. Get a tetanus shot every 10 years.   Nutrition:     Eat at least 5 servings of fruits and vegetables each day.    Eat whole-grain bread, whole-wheat pasta and brown rice instead of white grains and rice.    Talk to your provider about Calcium and Vitamin D.     Lifestyle    Exercise at least 150 minutes a week (30 minutes a day, 5 days of the week). This will help you control your weight and prevent disease.    Limit alcohol to one drink per day.    No smoking.     Wear sunscreen to prevent skin cancer.    See your dentist every six months for an exam and cleaning.            Follow-ups after your visit        Who to contact     If you have questions  or need follow up information about today's clinic visit or your schedule please contact [facility] directly at [phone].  Normal or non-critical lab and imaging results will be communicated to you by MyChart, letter or phone within 4 business days after the clinic has received the results. If you do not hear from us within 7 days, please contact the clinic through [employer] or phone. If you have a critical or abnormal lab result, we will notify you by phone as soon as possible.  Submit refill requests through [employer] or call your pharmacy and they will forward the refill request to us. Please allow 3 business days for your refill to be completed.          Additional Information About Your Visit        [email or web address] Information     [employer] gives you secure access to your electronic health record. If you see a primary care provider, you can also send messages to your care team and make appointments. If you have questions, please call your primary care clinic.  If you do not have a primary care provider, please call [phone] and they will assist you.        Care EveryWhere ID     This is your Care EveryWhere ID. This could be used by other organizations to access your [place] medical records  [id number]        Your Vitals Were     Pulse Temperature Last Period Pulse Oximetry Breastfeeding? BMI (Body Mass Index)    53 97.2  F (36.2  C) (Oral) 12/30/2017 (Approximate) 99% No 20.12 kg/m2       Blood Pressure from Last 3 Encounters:   01/02/18 107/63   12/19/17 118/71   01/11/17 110/72    Weight from Last 3 Encounters:   01/02/18 110 lb (49.9 kg)   12/19/17 116 lb (52.6 kg)   01/11/17 113 lb (51.3 kg)              We Performed the Following     Estradiol     Follicle stimulating hormone     Progesterone        Primary Care Provider Office Phone # Fax #    Gerry Barcenas [phone] [address] [facility] [address]        Equal Access to Services     MAYNOR YOON: Eitan rivera  rosie Interiano, gonzales kaur, andervale dardeniza lashalisette, waxjay idiin hayruizkelley mullinskariisabela choiNeptaliwin sean. So [facility] [phone].    ATENCIÓN: Si habla español, tiene a donovan disposición servicios gratuitos de asistencia lingüística. Llame al [phone].    We comply with applicable federal civil rights laws and [place] laws. We do not discriminate on the basis of race, color, national origin, age, disability, sex, sexual orientation, or gender identity.            Thank you!     Thank you for choosing [facility]  for your care. Our goal is always to provide you with excellent care. Hearing back from our patients is one way we can continue to improve our services. Please take a few minutes to complete the written survey that you may receive in the mail after your visit with us. Thank you!             Your Updated Medication List - Protect others around you: Learn how to safely use, store and throw away your medicines at www.disposemymeds.org.      Notice  As of 1/2/2018 11:28 AM    You have not been prescribed any medications.

## 2018-01-03 NOTE — PROGRESS NOTES
Mimi Quinones,       Your recent test results are attached, if you have any questions or concerns please feel free to contact me via e-mail or call [phone].  White and red blood cell counts normal.  No anemia.  Sodium, potassium, kidney and liver function normal.  Blood sugar normal, no diabetes.  Thyroid is normal.  Cholesterol looks overall very good. Hormone testing also normal.          It was a pleasure to see you at your recent office visit.      Sincerely,  Arlin Vizcaino PA-C

## 2018-01-08 ENCOUNTER — OFFICE VISIT (OUTPATIENT)
Dept: FAMILY MEDICINE | Facility: CLINIC | Age: 28
End: 2018-01-08
Payer: COMMERCIAL

## 2018-01-08 VITALS
WEIGHT: 110 LBS | HEIGHT: 62 IN | OXYGEN SATURATION: 100 % | SYSTOLIC BLOOD PRESSURE: 113 MMHG | DIASTOLIC BLOOD PRESSURE: 75 MMHG | BODY MASS INDEX: 20.24 KG/M2 | HEART RATE: 78 BPM | TEMPERATURE: 100.5 F

## 2018-01-08 DIAGNOSIS — Z12.4 SCREENING FOR MALIGNANT NEOPLASM OF CERVIX: Primary | ICD-10-CM

## 2018-01-08 PROCEDURE — G0145 SCR C/V CYTO,THINLAYER,RESCR: HCPCS | Performed by: PHYSICIAN ASSISTANT

## 2018-01-08 PROCEDURE — 99213 OFFICE O/P EST LOW 20 MIN: CPT | Performed by: PHYSICIAN ASSISTANT

## 2018-01-08 NOTE — LETTER
January 11, 2018      Stacie Daniels  [address] [facility] [address]    Dear ,      I am happy to inform you that your recent cervical cancer screening test (PAP smear) was normal.      Preventative screenings such as this help to ensure your health for years to come. You should repeat a pap smear in 3 years, unless otherwise directed.      You will still need to return to the clinic every year for your annual exam and other preventive tests.     Please contact the clinic at [phone] if you have further questions.       Sincerely,      Arlin Vizcaino PA-C/rian

## 2018-01-08 NOTE — NURSING NOTE
"Chief Complaint   Patient presents with     Gyn Exam     PAP       Initial /75  Pulse 78  Temp 100.5  F (38.1  C) (Oral)  Ht 5' 2\" (1.575 m)  Wt 110 lb (49.9 kg)  LMP 12/30/2017 (Approximate)  SpO2 100%  Breastfeeding? No  BMI 20.12 kg/m2 Estimated body mass index is 20.12 kg/(m^2) as calculated from the following:    Height as of this encounter: 5' 2\" (1.575 m).    Weight as of this encounter: 110 lb (49.9 kg).  Medication Reconciliation: complete      Luz Maria Loera MA    "

## 2018-01-08 NOTE — MR AVS SNAPSHOT
"              After Visit Summary   2018    Stacie Daniels    MRN: [id number]           Patient Information     Date Of Birth          1990        Visit Information        Provider Department      2018 11:00 AM Arlin Vizcaino PA-C [facility]        Today's Diagnoses     Screening for malignant neoplasm of cervix    -  1       Follow-ups after your visit        Who to contact     If you have questions or need follow up information about today's clinic visit or your schedule please contact [facility] directly at [phone].  Normal or non-critical lab and imaging results will be communicated to you by [employer], letter or phone within 4 business days after the clinic has received the results. If you do not hear from us within 7 days, please contact the clinic through [employer] or phone. If you have a critical or abnormal lab result, we will notify you by phone as soon as possible.  Submit refill requests through [employer] or call your pharmacy and they will forward the refill request to us. Please allow 3 business days for your refill to be completed.          Additional Information About Your Visit        MyChart Information     [employer] lets you send messages to your doctor, view your test results, renew your prescriptions, schedule appointments and more. To sign up, go to [email or web address] . Click on \"Log in\" on the left side of the screen, which will take you to the Welcome page. Then click on \"Sign up Now\" on the right side of the page.     You will be asked to enter the access code listed below, as well as some personal information. Please follow the directions to create your username and password.     Your access code is: [email or web address]  Expires: 2018 11:24 AM     Your access code will  in 90 days. If you need help or a new code, please call your [facility] or [phone].        Care EveryWhere ID     This is your Care EveryWhere ID. This " "could be used by other organizations to access your [place] medical records  [id number]        Your Vitals Were     Pulse Temperature Height Last Period Pulse Oximetry Breastfeeding?    78 100.5  F (38.1  C) (Oral) 5' 2\" (1.575 m) 12/30/2017 (Approximate) 100% No    BMI (Body Mass Index)                   20.12 kg/m2            Blood Pressure from Last 3 Encounters:   01/08/18 113/75   01/02/18 107/63   12/19/17 118/71    Weight from Last 3 Encounters:   01/08/18 110 lb (49.9 kg)   01/02/18 110 lb (49.9 kg)   12/19/17 116 lb (52.6 kg)              We Performed the Following     Pap imaged thin layer screen reflex to HPV if ASCUS - recommend age 25 - 29        Primary Care Provider Office Phone # Fax #    Gerry Maxi Barcenas [phone] [address] [facility] [address]        Equal Access to Services     [facility] : Hadii aad ku hadasho Soomaali, waaxda luqadaha, qaybta kaalmada adeegyada, waxay idiin hayruizn lasha [place] . So [facility] [phone].    ATENCIÓN: Si habla español, tiene a donovan disposición servicios gratuitos de asistencia lingüística. Llame al [phone].    We comply with applicable federal civil rights laws and [place] laws. We do not discriminate on the basis of race, color, national origin, age, disability, sex, sexual orientation, or gender identity.            Thank you!     Thank you for choosing [facility]  for your care. Our goal is always to provide you with excellent care. Hearing back from our patients is one way we can continue to improve our services. Please take a few minutes to complete the written survey that you may receive in the mail after your visit with us. Thank you!             Your Updated Medication List - Protect others around you: Learn how to safely use, store and throw away your medicines at www.disposemymeds.org.      Notice  As of 1/8/2018 11:24 AM    You have not been prescribed any medications.      "

## 2018-01-11 LAB
COPATH REPORT: NORMAL
PAP: NORMAL

## 2018-01-17 ENCOUNTER — OFFICE VISIT (OUTPATIENT)
Dept: OPHTHALMOLOGY | Facility: CLINIC | Age: 28
End: 2018-01-17
Payer: COMMERCIAL

## 2018-01-17 DIAGNOSIS — H55.00 NYSTAGMUS: Primary | ICD-10-CM

## 2018-01-17 PROCEDURE — 92002 INTRM OPH EXAM NEW PATIENT: CPT | Performed by: STUDENT IN AN ORGANIZED HEALTH CARE EDUCATION/TRAINING PROGRAM

## 2018-01-17 ASSESSMENT — REFRACTION_MANIFEST
OD_SPHERE: -1.50
OS_CYLINDER: +0.25
OD_CYLINDER: SPHERE
OS_AXIS: 180
OS_SPHERE: -1.75

## 2018-01-17 ASSESSMENT — SLIT LAMP EXAM - LIDS
COMMENTS: NORMAL
COMMENTS: NORMAL

## 2018-01-17 ASSESSMENT — EXTERNAL EXAM - RIGHT EYE: OD_EXAM: NORMAL

## 2018-01-17 ASSESSMENT — VISUAL ACUITY
OS_SC: 20/70+1
METHOD: SNELLEN - LINEAR
OD_SC: 20/60-2

## 2018-01-17 ASSESSMENT — CUP TO DISC RATIO
OD_RATIO: 0.3 UD
OS_RATIO: 0.3 UD

## 2018-01-17 ASSESSMENT — EXTERNAL EXAM - LEFT EYE: OS_EXAM: NORMAL

## 2018-01-17 NOTE — PATIENT INSTRUCTIONS
"Use artificial tears up to 4 times per day (Refresh Plus, Systane Balance, or generic artificial tears are ok. Avoid \"get the red out\" drops).   Referral to neurophthalmology.    Amrita Hassan MD  [phone]    "

## 2018-01-17 NOTE — LETTER
"    1/17/2018         RE: Stacie Daniels  [address] [facility] [address]        Dear Colleague,    Thank you for referring your patient, Stacie Daniels, to the [facility]. Please see a copy of my visit note below.     Current Eye Medications:  no     Subjective:  Eyes seem to shake x 6 months.  Patient says that her eyes seem to \"shake\" and she only notices when driving. Upon further questioning, she says her vision seems to blur about a second when this happen (seems less like shaking, but more like blurring). About 6 months ago she had an episode of passing out where she hit her head. Her mother is a neuro nurse and told pt she could see this shaking and stated it may be nystagmus.     Objective:  See Ophthalmology Exam.       Assessment:  Stacie Daniels is a 28 year old female who presents with:   Encounter Diagnosis   Name Primary?     Nystagmus Not seen on exam, but her mother would appreciate neuro-oph referral.       Plan:  Use artificial tears up to 4 times per day (Refresh Plus, Systane Balance, or generic artificial tears are ok. Avoid \"get the red out\" drops).   Referral to neurophthalmology.    Amrita Hassan MD  [phone]        Again, thank you for allowing me to participate in the care of your patient.        Sincerely,        Amrita Hassan MD    "

## 2018-01-17 NOTE — PROGRESS NOTES
" Current Eye Medications:  no     Subjective:  Eyes seem to shake x 6 months.  Patient says that her eyes seem to \"shake\" and she only notices when driving. Upon further questioning, she says her vision seems to blur about a second when this happen (seems less like shaking, but more like blurring). About 6 months ago she had an episode of passing out where she hit her head. Her mother is a neuro nurse and told pt she could see this shaking and stated it may be nystagmus.     Objective:  See Ophthalmology Exam.       Assessment:  Stacie Daniels is a 28 year old female who presents with:   Encounter Diagnosis   Name Primary?     Nystagmus Not seen on exam, but her mother would appreciate neuro-oph referral.       Plan:  Use artificial tears up to 4 times per day (Refresh Plus, Systane Balance, or generic artificial tears are ok. Avoid \"get the red out\" drops).   Referral to neurophthalmology.    Amrita Hassan MD  [phone]      "

## 2018-01-17 NOTE — MR AVS SNAPSHOT
"              After Visit Summary   1/17/2018    Stacie Daniels    MRN: [id number]           Patient Information     Date Of Birth          1990        Visit Information        Provider Department      1/17/2018 2:45 PM Amrita Hassan MD [facility]        Today's Diagnoses     Nystagmus    -  1      Care Instructions    Use artificial tears up to 4 times per day (Refresh Plus, Systane Balance, or generic artificial tears are ok. Avoid \"get the red out\" drops).   Referral to neurophthalmology.    Amrita Hassan MD  [phone]            Follow-ups after your visit        Additional Services     OPHTHALMOLOGY ADULT REFERRAL       Your provider has referred you to:  [facility]: [facility] [phone]   [email or web address]      Please be aware that coverage of these services is subject to the terms and limitations of your health insurance plan.  Call member services at your health plan with any benefit or coverage questions.      Please bring the following to your appointment:  >>   Any x-rays, CTs or MRIs which have been performed.  Contact the facility where they were done to arrange for  prior to your scheduled appointment.  Any new CT, MRI or other procedures ordered by your specialist must be performed at a [place] facility or coordinated by your clinic's referral office.    >>   List of current medications   >>   This referral request   >>   Any documents/labs given to you for this referral                  Follow-up notes from your care team     Return if symptoms worsen or fail to improve.      Who to contact     If you have questions or need follow up information about today's clinic visit or your schedule please contact [facility] directly at [phone].  Normal or non-critical lab and imaging results will be communicated to you by MyChart, letter or phone within 4 business days after the clinic has received the " "results. If you do not hear from us within 7 days, please contact the clinic through "[employer]" or phone. If you have a critical or abnormal lab result, we will notify you by phone as soon as possible.  Submit refill requests through "[employer]" or call your pharmacy and they will forward the refill request to us. Please allow 3 business days for your refill to be completed.          Additional Information About Your Visit        "[employer]" Information     "[employer]" lets you send messages to your doctor, view your test results, renew your prescriptions, schedule appointments and more. To sign up, go to [email or web address]/"[employer]" . Click on \"Log in\" on the left side of the screen, which will take you to the Welcome page. Then click on \"Sign up Now\" on the right side of the page.     You will be asked to enter the access code listed below, as well as some personal information. Please follow the directions to create your username and password.     Your access code is: [email or web address]  Expires: 2018 11:24 AM     Your access code will  in 90 days. If you need help or a new code, please call your [place] clinic or [phone].        Care EveryWhere ID     This is your Care EveryWhere ID. This could be used by other organizations to access your [place] medical records  [id number]        Your Vitals Were     Last Period                   2017 (Approximate)            Blood Pressure from Last 3 Encounters:   18 113/75   18 107/63   17 118/71    Weight from Last 3 Encounters:   18 49.9 kg (110 lb)   18 49.9 kg (110 lb)   17 52.6 kg (116 lb)              We Performed the Following     OPHTHALMOLOGY ADULT REFERRAL        Primary Care Provider Office Phone # Fax #    Gerry Barcenas [phone] [address] [address] [facility] [address]        Equal Access to Services     MAYNOR GOLDEN AH: Hadii miguel Interiano, waaxda luqadaha, qaybta margaux fontana " yamile mullinsira laNeptaliaan ah. So [facility] [phone].    ATENCIÓN: Si habla arnaldo, tiene a donovan disposición servicios gratuitos de asistencia lingüística. Llame al [phone].    We comply with applicable federal civil rights laws and [place] laws. We do not discriminate on the basis of race, color, national origin, age, disability, sex, sexual orientation, or gender identity.            Thank you!     Thank you for choosing [facility]  for your care. Our goal is always to provide you with excellent care. Hearing back from our patients is one way we can continue to improve our services. Please take a few minutes to complete the written survey that you may receive in the mail after your visit with us. Thank you!             Your Updated Medication List - Protect others around you: Learn how to safely use, store and throw away your medicines at www.disposemymeds.org.      Notice  As of 1/17/2018  3:39 PM    You have not been prescribed any medications.

## 2018-04-27 ENCOUNTER — OFFICE VISIT (OUTPATIENT)
Dept: OBGYN | Facility: CLINIC | Age: 28
End: 2018-04-27
Payer: COMMERCIAL

## 2018-04-27 VITALS
SYSTOLIC BLOOD PRESSURE: 108 MMHG | OXYGEN SATURATION: 100 % | DIASTOLIC BLOOD PRESSURE: 71 MMHG | HEART RATE: 83 BPM | BODY MASS INDEX: 21.75 KG/M2 | TEMPERATURE: 98.2 F | HEIGHT: 62 IN | WEIGHT: 118.2 LBS

## 2018-04-27 DIAGNOSIS — Z36.89 ENCOUNTER TO ESTABLISH GESTATIONAL AGE USING ULTRASOUND: ICD-10-CM

## 2018-04-27 DIAGNOSIS — N91.2 ABSENCE OF MENSTRUATION: Primary | ICD-10-CM

## 2018-04-27 LAB
B-HCG SERPL-ACNC: 2891 IU/L (ref 0–5)
BETA HCG QUAL IFA URINE: POSITIVE

## 2018-04-27 PROCEDURE — 84703 CHORIONIC GONADOTROPIN ASSAY: CPT | Performed by: NURSE PRACTITIONER

## 2018-04-27 PROCEDURE — 36415 COLL VENOUS BLD VENIPUNCTURE: CPT | Performed by: NURSE PRACTITIONER

## 2018-04-27 PROCEDURE — 84702 CHORIONIC GONADOTROPIN TEST: CPT | Performed by: NURSE PRACTITIONER

## 2018-04-27 PROCEDURE — 99202 OFFICE O/P NEW SF 15 MIN: CPT | Performed by: NURSE PRACTITIONER

## 2018-04-27 ASSESSMENT — PAIN SCALES - GENERAL: PAINLEVEL: NO PAIN (0)

## 2018-04-27 NOTE — PROGRESS NOTES
S: Pt is a 28 year old,  3 para 1 who presents today with absence of menses. LMP: unsure   Was actively trying for pregnancy x 1.5 years and they became frustrated, so stopped trying the last few months. Has not been sexually active for at least 4 weeks. Thinks she may be closer to 7-8 weeks gestation.  Complains of fatigue  Previous Pap smear .    Pertinent Past Obstetric and Gynecological Medical History:  x 1, 2 SAB prior to last pregnancy.  Last pregnancy complicated by hyperemesis and needed a PICC line.  Patient past medical, surgical, family, and social history reviewed.    O: General appearance: Well appearing female in no acute distress. Answers questions and maintains eye contact appropriately.  RESPIRATORY: Clear to auscultation bilaterally.  CV: Regular rate and rhythm without murmur, gallop, rub  ABDOMEN: Soft, nontender, nondistended, normoactive bowel sounds. No hepatosplenomegaly. No guarding, rebounding, or rigidity.  UPT Positive    A: Missed Menses  Weeks gestation: ultrasound to determine  KAI: ultrasound to determine    P: 1) Prenatal vitamins - will start  2) Diet, exercise, work, and environmental hazards reviewed.   Plans to see the [facility] for pregnancy and delivery. Quant HCG today to determine when ultrasound will be appropriate and then will place ultrasound order.  Toxoplasmosis precautions NA. Discussed avoidance of tobacco, ETOH, and street drugs. Signs and symptoms to monitor for and report discussed. Will schedule first OB visit at 10-12 weeks gestation.     Kimberley BROWN CNP

## 2018-04-27 NOTE — NURSING NOTE
"Chief Complaint   Patient presents with     Confirmation Of Pregnancy       Initial /71  Pulse 83  Temp 98.2  F (36.8  C) (Oral)  Ht 5' 2\" (1.575 m)  Wt 118 lb 3.2 oz (53.6 kg)  LMP  (LMP Unknown)  SpO2 100%  BMI 21.62 kg/m2 Estimated body mass index is 21.62 kg/(m^2) as calculated from the following:    Height as of this encounter: 5' 2\" (1.575 m).    Weight as of this encounter: 118 lb 3.2 oz (53.6 kg)..  BP completed using cuff size: shruthi Dumont CMA    "

## 2018-04-27 NOTE — MR AVS SNAPSHOT
"              After Visit Summary   4/27/2018    Stacie Daniels    MRN: [id number]           Patient Information     Date Of Birth          1990        Visit Information        Provider Department      4/27/2018 9:10 AM Kimberley De La Cruz APRN CNP [facility]        Today's Diagnoses     Absence of menstruation    -  1       Follow-ups after your visit        Your next 10 appointments already scheduled     May 29, 2018  9:30 AM CDT   New Prenatal with Helen Smallwood MD, [facility]    [address] [facility] [address]   [phone]              Who to contact     If you have questions or need follow up information about today's clinic visit or your schedule please contact [facility] directly at [phone].  Normal or non-critical lab and imaging results will be communicated to you by MyChart, letter or phone within 4 business days after the clinic has received the results. If you do not hear from us within 7 days, please contact the clinic through MyChart or phone. If you have a critical or abnormal lab result, we will notify you by phone as soon as possible.  Submit refill requests through "[employer] or call your pharmacy and they will forward the refill request to us. Please allow 3 business days for your refill to be completed.          Additional Information About Your Visit        MyChart Information     "[employer] lets you send messages to your doctor, view your test results, renew your prescriptions, schedule appointments and more. To sign up, go to [email or web address] . Click on \"Log in\" on the left side of the screen, which will take you to the Welcome page. Then click on \"Sign up Now\" on the right side of the page.     You will be asked to enter the access code listed below, as well as some personal information. Please follow the directions to create your username and password.     Your access " "code is: [email or web address]  Expires: 2018  9:43 AM     Your access code will  in 90 days. If you need help or a new code, please call your [place] clinic or [phone].        Care EveryWhere ID     This is your Care EveryWhere ID. This could be used by other organizations to access your [place] medical records  [id number]        Your Vitals Were     Pulse Temperature Height Last Period Pulse Oximetry BMI (Body Mass Index)    83 98.2  F (36.8  C) (Oral) 5' 2\" (1.575 m) (LMP Unknown) 100% 21.62 kg/m2       Blood Pressure from Last 3 Encounters:   18 108/71   18 113/75   18 107/63    Weight from Last 3 Encounters:   18 118 lb 3.2 oz (53.6 kg)   18 110 lb (49.9 kg)   18 110 lb (49.9 kg)              We Performed the Following     Beta HCG qual [facility] urine - [facility] and [facility]     HCG Quantitative Pregnancy, Blood (BZR428)        Primary Care Provider Office Phone # Fax #    Gerry Barcenas [phone] [address] [facility] [address]        Equal Access to Services     MAYNOR GOLDEN : Hadii miguel ku hadasho Soomaali, waaxda luqadaha, qaybta kaalmada adeegyada, waxay marcusin hayruizn lasha [place] . So [facility] [phone].    ATENCIÓN: Si habla español, tiene a donovan disposición servicios gratuitos de asistencia lingüística. Llame al [phone].    We comply with applicable federal civil rights laws and [place] laws. We do not discriminate on the basis of race, color, national origin, age, disability, sex, sexual orientation, or gender identity.            Thank you!     Thank you for choosing [facility]  for your care. Our goal is always to provide you with excellent care. Hearing back from our patients is one way we can continue to improve our services. Please take a few minutes to complete the written survey that you may receive in the mail after your visit with us. Thank you!             Your Updated Medication List - Protect " others around you: Learn how to safely use, store and throw away your medicines at www.disposemymeds.org.      Notice  As of 4/27/2018  9:43 AM    You have not been prescribed any medications.

## 2018-05-04 ENCOUNTER — HOSPITAL ENCOUNTER (OUTPATIENT)
Dept: ULTRASOUND IMAGING | Facility: CLINIC | Age: 28
Discharge: HOME | End: 2018-05-04
Attending: NURSE PRACTITIONER | Admitting: NURSE PRACTITIONER
Payer: COMMERCIAL

## 2018-05-04 DIAGNOSIS — Z36.89 ENCOUNTER TO ESTABLISH GESTATIONAL AGE USING ULTRASOUND: ICD-10-CM

## 2018-05-04 PROCEDURE — 76801 OB US < 14 WKS SINGLE FETUS: CPT

## 2018-05-07 ENCOUNTER — TELEPHONE (OUTPATIENT)
Dept: OBGYN | Facility: CLINIC | Age: 28
End: 2018-05-07

## 2018-05-07 DIAGNOSIS — O21.9 NAUSEA/VOMITING IN PREGNANCY: Primary | ICD-10-CM

## 2018-05-07 RX ORDER — ONDANSETRON 8 MG/1
8 TABLET, ORALLY DISINTEGRATING ORAL
Qty: 60 TABLET | Refills: 1 | Status: ON HOLD | OUTPATIENT
Start: 2018-05-07 | End: 2018-12-22

## 2018-05-07 NOTE — TELEPHONE ENCOUNTER
Patient states she is pregnant and she would like to know if she can get something for nausea.  Please call when done.    Thank you.

## 2018-05-07 NOTE — TELEPHONE ENCOUNTER
Phone call to pt. Gave results and orders per Dr. Mcclelland as below. Pt verbalized understanding and agreed to follow plan. Joi Mckeon RN, BAN

## 2018-05-07 NOTE — TELEPHONE ENCOUNTER
Prescription sent. History of hyperemesis in previous pregnancy. Has used Zofran previously. If symptoms mostly only occurring for those few hours in the AM, can try taking it before bed or if there is a consistent time she wakes up at night before then, can try then too. But can take up to TID PRN. Kimberley BROWN CNP

## 2018-05-14 ENCOUNTER — TELEPHONE (OUTPATIENT)
Dept: OBGYN | Facility: CLINIC | Age: 28
End: 2018-05-14

## 2018-05-14 NOTE — TELEPHONE ENCOUNTER
"Zofran is controlling nausea & vomiting during the day \"from about 8:00 on.\" Patient stated \"the nocs are still pretty rough.\" Reported \"a couple days\" since last BM \"so starting to get a little uncomfortable.\" Gave advise per [facility] handout Taking Medicine during Pregnancy. Patient appreciative of assistance and agreed to follow plan. Joi Mckeon RN, BAN    "

## 2018-05-14 NOTE — TELEPHONE ENCOUNTER
Patient is calling to discuss the constipation she is having wondering if the ondansetron (ZOFRAN ODT) 8 MG ODT tab is causing this. Patient would like to know what OTC medication she can take  Please call to discuss  Thank you

## 2018-05-15 ENCOUNTER — HOSPITAL ENCOUNTER (EMERGENCY)
Facility: CLINIC | Age: 28
Discharge: HOME | End: 2018-05-15
Attending: PHYSICIAN ASSISTANT | Admitting: PHYSICIAN ASSISTANT
Payer: COMMERCIAL

## 2018-05-15 VITALS
TEMPERATURE: 98.1 F | OXYGEN SATURATION: 99 % | BODY MASS INDEX: 21.95 KG/M2 | RESPIRATION RATE: 16 BRPM | WEIGHT: 120 LBS | HEART RATE: 94 BPM | DIASTOLIC BLOOD PRESSURE: 67 MMHG | SYSTOLIC BLOOD PRESSURE: 102 MMHG

## 2018-05-15 DIAGNOSIS — O21.9 VOMITING DURING PREGNANCY: ICD-10-CM

## 2018-05-15 LAB
ALBUMIN SERPL-MCNC: 3.9 G/DL (ref 3.4–5)
ALBUMIN UR-MCNC: NEGATIVE MG/DL
ALP SERPL-CCNC: 54 U/L (ref 40–150)
ALT SERPL W P-5'-P-CCNC: 14 U/L (ref 0–50)
ANION GAP SERPL CALCULATED.3IONS-SCNC: 7 MMOL/L (ref 3–14)
APPEARANCE UR: ABNORMAL
AST SERPL W P-5'-P-CCNC: 12 U/L (ref 0–45)
BACTERIA #/AREA URNS HPF: ABNORMAL /HPF
BASOPHILS # BLD AUTO: 0.1 10E9/L (ref 0–0.2)
BASOPHILS NFR BLD AUTO: 0.5 %
BILIRUB SERPL-MCNC: 0.3 MG/DL (ref 0.2–1.3)
BILIRUB UR QL STRIP: NEGATIVE
BUN SERPL-MCNC: 6 MG/DL (ref 7–30)
CALCIUM SERPL-MCNC: 8.8 MG/DL (ref 8.5–10.1)
CHLORIDE SERPL-SCNC: 107 MMOL/L (ref 94–109)
CO2 SERPL-SCNC: 26 MMOL/L (ref 20–32)
COLOR UR AUTO: YELLOW
CREAT SERPL-MCNC: 0.69 MG/DL (ref 0.52–1.04)
DIFFERENTIAL METHOD BLD: NORMAL
EOSINOPHIL # BLD AUTO: 0.1 10E9/L (ref 0–0.7)
EOSINOPHIL NFR BLD AUTO: 1.1 %
ERYTHROCYTE [DISTWIDTH] IN BLOOD BY AUTOMATED COUNT: 11.9 % (ref 10–15)
GFR SERPL CREATININE-BSD FRML MDRD: >90 ML/MIN/1.7M2
GLUCOSE SERPL-MCNC: 74 MG/DL (ref 70–99)
GLUCOSE UR STRIP-MCNC: 150 MG/DL
HCT VFR BLD AUTO: 40.4 % (ref 35–47)
HGB BLD-MCNC: 14 G/DL (ref 11.7–15.7)
HGB UR QL STRIP: NEGATIVE
IMM GRANULOCYTES # BLD: 0 10E9/L (ref 0–0.4)
IMM GRANULOCYTES NFR BLD: 0.3 %
KETONES UR STRIP-MCNC: 20 MG/DL
LEUKOCYTE ESTERASE UR QL STRIP: NEGATIVE
LYMPHOCYTES # BLD AUTO: 1.5 10E9/L (ref 0.8–5.3)
LYMPHOCYTES NFR BLD AUTO: 14.4 %
MCH RBC QN AUTO: 31.8 PG (ref 26.5–33)
MCHC RBC AUTO-ENTMCNC: 34.7 G/DL (ref 31.5–36.5)
MCV RBC AUTO: 92 FL (ref 78–100)
MONOCYTES # BLD AUTO: 0.8 10E9/L (ref 0–1.3)
MONOCYTES NFR BLD AUTO: 7.5 %
MUCOUS THREADS #/AREA URNS LPF: PRESENT /LPF
NEUTROPHILS # BLD AUTO: 7.7 10E9/L (ref 1.6–8.3)
NEUTROPHILS NFR BLD AUTO: 76.2 %
NITRATE UR QL: NEGATIVE
PH UR STRIP: 6 PH (ref 5–7)
PLATELET # BLD AUTO: 176 10E9/L (ref 150–450)
POTASSIUM SERPL-SCNC: 3.7 MMOL/L (ref 3.4–5.3)
PROT SERPL-MCNC: 7.5 G/DL (ref 6.8–8.8)
RBC # BLD AUTO: 4.4 10E12/L (ref 3.8–5.2)
RBC #/AREA URNS AUTO: <1 /HPF (ref 0–2)
SODIUM SERPL-SCNC: 140 MMOL/L (ref 133–144)
SOURCE: ABNORMAL
SP GR UR STRIP: 1 (ref 1–1.03)
SQUAMOUS #/AREA URNS AUTO: 13 /HPF (ref 0–1)
UROBILINOGEN UR STRIP-MCNC: 0 MG/DL (ref 0–2)
WBC # BLD AUTO: 10.1 10E9/L (ref 4–11)
WBC #/AREA URNS AUTO: 1 /HPF (ref 0–5)

## 2018-05-15 PROCEDURE — 25000128 H RX IP 250 OP 636: Performed by: EMERGENCY MEDICINE

## 2018-05-15 PROCEDURE — 96376 TX/PRO/DX INJ SAME DRUG ADON: CPT | Performed by: PHYSICIAN ASSISTANT

## 2018-05-15 PROCEDURE — 81001 URINALYSIS AUTO W/SCOPE: CPT | Performed by: PHYSICIAN ASSISTANT

## 2018-05-15 PROCEDURE — 87086 URINE CULTURE/COLONY COUNT: CPT | Performed by: PHYSICIAN ASSISTANT

## 2018-05-15 PROCEDURE — 25000128 H RX IP 250 OP 636: Performed by: PHYSICIAN ASSISTANT

## 2018-05-15 PROCEDURE — 85025 COMPLETE CBC W/AUTO DIFF WBC: CPT | Performed by: PHYSICIAN ASSISTANT

## 2018-05-15 PROCEDURE — 80053 COMPREHEN METABOLIC PANEL: CPT | Performed by: PHYSICIAN ASSISTANT

## 2018-05-15 PROCEDURE — 99284 EMERGENCY DEPT VISIT MOD MDM: CPT | Mod: 25 | Performed by: PHYSICIAN ASSISTANT

## 2018-05-15 PROCEDURE — 99284 EMERGENCY DEPT VISIT MOD MDM: CPT | Mod: Z6 | Performed by: PHYSICIAN ASSISTANT

## 2018-05-15 PROCEDURE — 96374 THER/PROPH/DIAG INJ IV PUSH: CPT | Performed by: PHYSICIAN ASSISTANT

## 2018-05-15 PROCEDURE — 96375 TX/PRO/DX INJ NEW DRUG ADDON: CPT | Performed by: PHYSICIAN ASSISTANT

## 2018-05-15 PROCEDURE — 96361 HYDRATE IV INFUSION ADD-ON: CPT | Performed by: PHYSICIAN ASSISTANT

## 2018-05-15 RX ORDER — PROMETHAZINE HYDROCHLORIDE 25 MG/1
25 TABLET ORAL EVERY 6 HOURS PRN
Qty: 20 TABLET | Refills: 1 | Status: SHIPPED | OUTPATIENT
Start: 2018-05-15 | End: 2018-05-25

## 2018-05-15 RX ORDER — ONDANSETRON 2 MG/ML
4 INJECTION INTRAMUSCULAR; INTRAVENOUS ONCE
Status: COMPLETED | OUTPATIENT
Start: 2018-05-15 | End: 2018-05-15

## 2018-05-15 RX ORDER — PROMETHAZINE HYDROCHLORIDE 25 MG/ML
25 INJECTION, SOLUTION INTRAMUSCULAR; INTRAVENOUS ONCE
Status: COMPLETED | OUTPATIENT
Start: 2018-05-15 | End: 2018-05-15

## 2018-05-15 RX ORDER — DEXTROSE, SODIUM CHLORIDE, SODIUM LACTATE, POTASSIUM CHLORIDE, AND CALCIUM CHLORIDE 5; .6; .31; .03; .02 G/100ML; G/100ML; G/100ML; G/100ML; G/100ML
INJECTION, SOLUTION INTRAVENOUS CONTINUOUS
Status: DISCONTINUED | OUTPATIENT
Start: 2018-05-15 | End: 2018-05-15 | Stop reason: HOSPADM

## 2018-05-15 RX ADMIN — PROMETHAZINE HYDROCHLORIDE 25 MG: 25 INJECTION INTRAMUSCULAR; INTRAVENOUS at 10:31

## 2018-05-15 RX ADMIN — SODIUM CHLORIDE, SODIUM LACTATE, POTASSIUM CHLORIDE, CALCIUM CHLORIDE AND DEXTROSE MONOHYDRATE: 5; 600; 310; 30; 20 INJECTION, SOLUTION INTRAVENOUS at 11:00

## 2018-05-15 RX ADMIN — ONDANSETRON 4 MG: 2 INJECTION INTRAMUSCULAR; INTRAVENOUS at 12:41

## 2018-05-15 RX ADMIN — SODIUM CHLORIDE 1000 ML: 9 INJECTION, SOLUTION INTRAVENOUS at 09:51

## 2018-05-15 RX ADMIN — ONDANSETRON 4 MG: 2 INJECTION INTRAMUSCULAR; INTRAVENOUS at 09:51

## 2018-05-15 NOTE — ED NOTES
7 1/2 weeks pregnant with hyperemesis -ran out of zofran on weekend and filled rx today-last urination 24 hr ago

## 2018-05-15 NOTE — ED PROVIDER NOTES
History     Chief Complaint   Patient presents with     Vomiting     7 weeks pregnant     HPI  Stacie Daniels is a 28 year old  with 2 prior spontaneous abortions is currently 7.5 weeks pregnant who presents to the emergency department with concern over emesis.  Patient had a history of hyperemesis gravidarum with her first pregnancy requiring PICC line and daily Zofran use.  She states her nausea has been fairly well controlled with Zofran this pregnancy thus far however she ran out of medication 2 days ago which resulted in persistent vomiting more than 10+ times daily.  She has been unable to keep fluids down and has not urinated in the last 24 hours.  She complains of fatigue, weakness and some abdominal cramping which she attributes to dehydration.  She states she had similar cramping with past pregnancy when she was dehydrated.  She has not had any recent fevers, chills, headache, nasal congestion, cough, shortness of breath, chest pains, palpitations, hematemesis, diarrhea, melena, vaginal bleeding or spotting.  She has not had any dysuria, urgency or hematuria, but states that her output is obviously decreased.  She did receive a refill of her Zofran late yesterday has taken it since, last dose was 6 AM this morning however she states she is not getting relief from vomiting.  She denies any contacts with GI symptoms.  No suspected bad food exposures.    Problem List:    Patient Active Problem List    Diagnosis Date Noted     Panic attacks 2014     Priority: Medium     PTSD (post-traumatic stress disorder) 10/07/2013     Priority: Medium     Hyperemesis arising during pregnancy 2013     Priority: Medium     Has PICC line  Daily zofran and fluids  Desires IOL 2013 pitocin       Sciatica 2013     Priority: Medium     Generalized anxiety disorder 2013     Priority: Medium     Diagnosis updated by automated process. Provider to review and confirm.       Supervision of normal  first pregnancy 11/26/2012     Priority: Medium     FOB (fiance): Ean       CARDIOVASCULAR SCREENING; LDL GOAL LESS THAN 160 02/08/2012     Priority: Medium     ADHD (attention deficit hyperactivity disorder) 01/25/2005     Priority: Medium     Off Concerta        Past Medical History:    Past Medical History:   Diagnosis Date     Chickenpox      Febrile convulsion (H)      Major depressive disorder, recurrent episode, mild (H)      Ulcer (H)      Past Surgical History:    Past Surgical History:   Procedure Laterality Date     FOREIGN BODY REMOVAL      from her eye     Family History:    Family History   Problem Relation Age of Onset     C.A.D. Father      CANCER Father      lymphoma     Prostate Cancer Father      CANCER Maternal Grandmother      rectal     Depression Maternal Grandmother      CEREBROVASCULAR DISEASE Maternal Grandmother      Prostate Cancer Maternal Grandfather      Breast Cancer Paternal Grandmother      Hypertension Paternal Grandmother      Depression Paternal Grandmother      Thyroid Disease Paternal Grandmother      Hypertension Paternal Grandfather      Depression Mother      Bipolar Disorder Mother      Depression Sister      Bipolar Disorder Sister      Depression Sister      Schizophrenia Maternal Uncle      Suicide Paternal Aunt      DIABETES No family hx of      Glaucoma No family hx of      Macular Degeneration No family hx of      Social History:  Marital Status:  Single [1]  Social History   Substance Use Topics     Smoking status: Former Smoker     Smokeless tobacco: Never Used     Alcohol use Yes      Medications:      ondansetron (ZOFRAN ODT) 8 MG ODT tab     Review of Systems  CONSTITUTIONAL:POSITIVE  for fatigue, myalgias and NEGATIVE for fever, chills   INTEGUMENTARY/SKIN: NEGATIVE for worrisome rashes, moles or lesions  EYES: NEGATIVE for vision changes or irritation  ENT/MOUTH: NEGATIVE for ear, mouth and throat problems  RESP:NEGATIVE for significant cough or SOB  GI:  POSITIVE for nausea, vomiting and mild abdominal cramping NEGATIVE for diarrhea  :  POSITIVE for decreased urine output NEGATIVE for dysuria, increased frequency, urgency, burning, hematuria, vaginal bleeding or discharge.   Physical Exam   BP: 101/67  Pulse: 94  Temp: 98.1  F (36.7  C)  Resp: 16  Weight: 54.4 kg (120 lb)  SpO2: 100 %  Physical Exam   Constitutional: She is oriented to person, place, and time. She appears well-developed and well-nourished. No distress.   HENT:   Head: Normocephalic and atraumatic.   Mouth/Throat: Uvula is midline. Mucous membranes are dry. No oropharyngeal exudate.   Eyes: Conjunctivae and EOM are normal. Pupils are equal, round, and reactive to light. Right eye exhibits no discharge. Left eye exhibits no discharge.   Neck: Normal range of motion.   Cardiovascular: Normal rate, regular rhythm and normal heart sounds.  Exam reveals no gallop and no friction rub.    No murmur heard.  Pulmonary/Chest: Effort normal and breath sounds normal. No respiratory distress. She has no wheezes. She has no rales.   Abdominal: Soft. Bowel sounds are normal. There is tenderness (mild suprapubic). There is no rebound and no guarding.   Genitourinary:   Genitourinary Comments: Deferred   Lymphadenopathy:     She has no cervical adenopathy.   Neurological: She is alert and oriented to person, place, and time.   Skin: Skin is warm and dry. No rash noted. No erythema.   Psychiatric: She has a normal mood and affect.     ED Course     ED Course     Procedures        Critical Care time:  none        Results for orders placed or performed during the hospital encounter of 05/15/18 (from the past 24 hour(s))   CBC with platelets differential   Result Value Ref Range    WBC 10.1 4.0 - 11.0 10e9/L    RBC Count 4.40 3.8 - 5.2 10e12/L    Hemoglobin 14.0 11.7 - 15.7 g/dL    Hematocrit 40.4 35.0 - 47.0 %    MCV 92 78 - 100 fl    MCH 31.8 26.5 - 33.0 pg    MCHC 34.7 31.5 - 36.5 g/dL    RDW 11.9 10.0 - 15.0 %     Platelet Count 176 150 - 450 10e9/L    Diff Method Automated Method     % Neutrophils 76.2 %    % Lymphocytes 14.4 %    % Monocytes 7.5 %    % Eosinophils 1.1 %    % Basophils 0.5 %    % Immature Granulocytes 0.3 %    Absolute Neutrophil 7.7 1.6 - 8.3 10e9/L    Absolute Lymphocytes 1.5 0.8 - 5.3 10e9/L    Absolute Monocytes 0.8 0.0 - 1.3 10e9/L    Absolute Eosinophils 0.1 0.0 - 0.7 10e9/L    Absolute Basophils 0.1 0.0 - 0.2 10e9/L    Abs Immature Granulocytes 0.0 0 - 0.4 10e9/L   Comprehensive metabolic panel   Result Value Ref Range    Sodium 140 133 - 144 mmol/L    Potassium 3.7 3.4 - 5.3 mmol/L    Chloride 107 94 - 109 mmol/L    Carbon Dioxide 26 20 - 32 mmol/L    Anion Gap 7 3 - 14 mmol/L    Glucose 74 70 - 99 mg/dL    Urea Nitrogen 6 (L) 7 - 30 mg/dL    Creatinine 0.69 0.52 - 1.04 mg/dL    GFR Estimate >90 >60 mL/min/1.7m2    GFR Estimate If Black >90 >60 mL/min/1.7m2    Calcium 8.8 8.5 - 10.1 mg/dL    Bilirubin Total 0.3 0.2 - 1.3 mg/dL    Albumin 3.9 3.4 - 5.0 g/dL    Protein Total 7.5 6.8 - 8.8 g/dL    Alkaline Phosphatase 54 40 - 150 U/L    ALT 14 0 - 50 U/L    AST 12 0 - 45 U/L     Medications   ondansetron (ZOFRAN) injection 4 mg (4 mg Intravenous Given 5/15/18 4169)   0.9% sodium chloride BOLUS (0 mLs Intravenous Stopped 5/15/18 1059)   promethazine (PHENERGAN) IV injection 25 mg (25 mg Intravenous Given 5/15/18 1031)   ondansetron (ZOFRAN) injection 4 mg (4 mg Intravenous Given 5/15/18 1241)     12:07 PM patient reports significant improvement, cramping and lightheadedness have resolved.  She has provided urine sample.  Did attempt to obtain fetal heart tones with Doppler however were not heard at this time, this is not uncommon at this state of her pregnancy.      Assessments & Plan (with Medical Decision Making)     I have reviewed the nursing notes.    I have reviewed the findings, diagnosis, plan and need for follow up with the patient.     Discharge Medication List as of 5/15/2018  1:25 PM         Final diagnoses:   Vomiting during pregnancy     28 year old  with 2 prior spontaneous abortions who is currently 7.5 weeks pregnant and has had a history of hyperemesis gravidarum with past pregnancy requiring PICC line presents to the emergency department with concern over emesis which has been more consistent for the last 2 days with 10+ episodes of emesis daily after running out of her prescription Zofran.  She did have stable vital signs upon arrival.  Physical exam findings as described above were significant for patient with moderate dehydration.  Patient tolerated IV fluids and was given Phenergan, Zofran for nausea.  She reported significant improvement initially however as she attempted to ambulate she did have some recurrence of her nausea.  She requested an additional dose of Zofran prior to discharge.  I discussed risk/benefits of alternate antinausea medications and patient declined at that time.  Differential for her symptoms would would include nausea and vomiting associated with pregnancy versus developing hyperemesis gravidarum which I would strongly favor a given prior history.  I have low suspicion for acute gastroenteritis/food borne illness given absence of diarrhea at this time.  She was discharged home stable with prescription for phenergan and instructions to follow-up with her OB/GYN for recheck within the next week.  Worrisome reasons to return to ER sooner discussed.    Disclaimer: This note consists of symbols derived from keyboarding, dictation, and/or voice recognition software. As a result, there may be errors in the script that have gone undetected.  Please consider this when interpreting information found in the chart.    5/15/2018   [facility] EMERGENCY DEPARTMENT     Stacey Melvin PA-C  [phone]

## 2018-05-15 NOTE — ED AVS SNAPSHOT
[facility] Emergency Department    [address]    [facility] [address]    Phone:  [phone]    Fax:  [phone]                                       Stacie Daniels   MRN: [id number]    Department:  [facility] Emergency Department   Date of Visit:  5/15/2018           Patient Information     Date Of Birth          1990        Your diagnoses for this visit were:     Vomiting during pregnancy        You were seen by Stacey Melvin PA-C.      Follow-up Information     Follow up with Gerry Barcenas MD In 1 week.    Specialties:  Family Practice, Obstetrics    Why:  As needed, If symptoms worsen    Contact information:    [address] [address] [facility] [address]  [phone]          Discharge Instructions       Eating Tips for Morning Sickness   Hyperemesis Gravidarum  During pregnancy, you need to eat enough food to meet your needs and the needs of your baby. Severe nausea and vomiting (hyperemesis) may lead to weight loss and dehydration (too little fluid in the body).   Follow these tips to help control nausea.   1. Eat 6 to 8 small meals in a day, about two hours apart.  2. Before rising in the morning, eat a small amount of dry food. Choose from the list below.  ? soda crackers (saltines)  ? dry toast with jelly  ? breadsticks  ? dry cereal  ? rice cakes  ? pretzels  ? plain potatoes, rice or noodles  ? plain low-fat cookies or cake  3. Avoid liquids with meals. Drink liquids 30 to 60 minutes before or after eating. Sip slowly.  4. Foods and drinks should be cool or at room temperature. Try:  ? flavored gelatin  ? sherbet, sorbet or Popsicles  ? carbonated (fizzy) drinks  ? ice cubes made from juice.  5. Avoid hot drinks and foods.  6. Avoid drinks with caffeine (coffee, tea, cola drinks). They may increase stomach acid.  7. Avoid very sweet, hot or spicy foods.  8. Avoid high-fat foods such as butter, margarine, mayonnaise, espinoza, gravies, pie crust, pastries and fried foods. They  take longer to leave the stomach.  9. Avoid strong food odors such as fish, cabbage or broccoli. Avoid cooking odors by eating food you do not have to cook.  10. Do not lie down after eating. Rest sitting up for an hour after meals.  11. Take your prenatal vitamins with food in the evening. Tell your doctor if you cannot take them.  12. Nausea is often gone by midday. You may eat more food in the late afternoon, supper and mid-evening. Find the times best for you.  Keep a food diary to help you find foods that you can eat without problems. Try any food that appeals to you.  Menu Planning Guidelines     [employer]. Reprinted with permission.  Food groups Foods recommended  Foods that may cause distress    Soups Low-fat broth-based and cream soups made with allowed foods. All other soups.    Meats and substitutes  (Six or more ounces daily) All lean, tender meats, poultry or fish. All should be baked, broiled or boiled. Boiled egg. Low-fat or fat-free cheeses. Fried meat, poultry or fish; highly seasoned, cured or smoked meat, poultry or fish (i.e., corned beef, luncheon meat, frankfurters, sausages, sardines, anchovies, espinoza and strong flavored cheese). Peanut butter.    Fruits (Two or more servings daily; include a vitamin C source daily) Fruit juices, canned fruits, grapefruit and orange sections (without membrane). Other fresh and dried fruits, if tolerated.     Vegetables  (Three or more servings daily) Vegetable juices, cooked vegetables (i.e., asparagus, green or wax beans, beets, carrots, peas, pumpkin, winter squash, spinach and mushrooms). Raw vegetables if tolerated.  Gas-forming vegetables (i.e., dried peas and beans, corn, broccoli, onions, cauliflower, [place] sprouts, cucumbers, cabbage, turnip, rutabagas, sauerkraut, green peppers).    Bread, cereal, potato, pasta and grains  (Six or more servings daily) Enriched breads and cereals, plain crackers, potatoes, enriched rice, barley, noodles, spaghetti,  macaroni and other pastas. Very coarse cereals such as bran; seeds in or on breads, rolls and crackers; breads made with nuts or dried fruits; fried breads and pastries such as doughnuts; fried potatoes, fried rice, wild rice, seasoned rice and pasta mixes.    Dessert fats Low-fat versions of cakes, cookies, custard, pudding, ice cream, frozen yogurt sherbet; ice pops, gelatin, frozen fruit bars, sorbet. Desserts containing salad dressings, nuts, coconut; high-fat desserts.    Milk and milk products (Four or more cups daily) Fat-free and low-fat milk products. Whole milk, cream.    Beverages   (Four or more cups daily) Water, decaffeinated coffee and tea, fruit drinks, caffeine-free carbonated beverages, weak tea, lemonade, sports drinks. All caffeine-containing beverages (i.e., coffee, strong tea, cocoa, cola); alcoholic beverages.    Condiments and sweets Iodized salt, flavorings, low-fat gravies and sauces, herbs and spices as tolerated; sugar, syrup, honey, jelly, seedless jam, hard candies and marshmallows. Strongly flavored seasonings and condiments (i.e., catsup, pepper, barbecue sauce, chili sauce, chili pepper, horseradish, garlic, mustard and vinegar), olives, pickles, nuts, chocolate candy.    For informational purposes only. Not to replace the advice of your health care provider.   Copyright   2006 [facility]. All rights reserved. [employer] 253207 - REV 09/15.      Your next 10 appointments already scheduled     May 29, 2018  9:30 AM CDT   New Prenatal with Helen Smallwood MD, [facility]   [facility]    [address] [place] [address]   [phone]              24 Hour Appointment Hotline       To make an appointment at any [facility], call [phone] ([phone]). If you don't have a family doctor or clinic, we will help you find one. [facility] are conveniently located to serve the needs of you and your  family.             Review of your medicines      Our records show that you are taking the medicines listed below. If these are incorrect, please call your family doctor or clinic.        Dose / Directions Last dose taken    ondansetron 8 MG ODT tab   Commonly known as:  ZOFRAN ODT   Dose:  8 mg   Quantity:  60 tablet        Take 1 tablet (8 mg) by mouth 3 times daily (before meals)   Refills:  1                Procedures and tests performed during your visit     CBC with platelets differential    Comprehensive metabolic panel    UA reflex to Microscopic    Urine Culture Aerobic Bacterial      Orders Needing Specimen Collection     None      Pending Results     Date and Time Order Name Status Description    5/15/2018 1234 Urine Culture Aerobic Bacterial In process             Pending Culture Results     Date and Time Order Name Status Description    5/15/2018 1234 Urine Culture Aerobic Bacterial In process             Pending Results Instructions     If you had any lab results that were not finalized at the time of your Discharge, you can call the ED Lab Result RN at [phone]. You will be contacted by this team for any positive Lab results or changes in treatment. The nurses are available 7 days a week from 10A to 6:30P.  You can leave a message 24 hours per day and they will return your call.        Test Results From Your Hospital Stay        5/15/2018 10:22 AM      Component Results     Component Value Ref Range & Units Status    WBC 10.1 4.0 - 11.0 10e9/L Final    RBC Count 4.40 3.8 - 5.2 10e12/L Final    Hemoglobin 14.0 11.7 - 15.7 g/dL Final    Hematocrit 40.4 35.0 - 47.0 % Final    MCV 92 78 - 100 fl Final    MCH 31.8 26.5 - 33.0 pg Final    MCHC 34.7 31.5 - 36.5 g/dL Final    RDW 11.9 10.0 - 15.0 % Final    Platelet Count 176 150 - 450 10e9/L Final    Diff Method Automated Method  Final    % Neutrophils 76.2 % Final    % Lymphocytes 14.4 % Final    % Monocytes 7.5 % Final    % Eosinophils 1.1 % Final    %  Basophils 0.5 % Final    % Immature Granulocytes 0.3 % Final    Absolute Neutrophil 7.7 1.6 - 8.3 10e9/L Final    Absolute Lymphocytes 1.5 0.8 - 5.3 10e9/L Final    Absolute Monocytes 0.8 0.0 - 1.3 10e9/L Final    Absolute Eosinophils 0.1 0.0 - 0.7 10e9/L Final    Absolute Basophils 0.1 0.0 - 0.2 10e9/L Final    Abs Immature Granulocytes 0.0 0 - 0.4 10e9/L Final         5/15/2018 10:37 AM      Component Results     Component Value Ref Range & Units Status    Sodium 140 133 - 144 mmol/L Final    Potassium 3.7 3.4 - 5.3 mmol/L Final    Chloride 107 94 - 109 mmol/L Final    Carbon Dioxide 26 20 - 32 mmol/L Final    Anion Gap 7 3 - 14 mmol/L Final    Glucose 74 70 - 99 mg/dL Final    Urea Nitrogen 6 (L) 7 - 30 mg/dL Final    Creatinine 0.69 0.52 - 1.04 mg/dL Final    GFR Estimate >90 >60 mL/min/1.7m2 Final    Non  GFR Calc    GFR Estimate If Black >90 >60 mL/min/1.7m2 Final    African American GFR Calc    Calcium 8.8 8.5 - 10.1 mg/dL Final    Bilirubin Total 0.3 0.2 - 1.3 mg/dL Final    Albumin 3.9 3.4 - 5.0 g/dL Final    Protein Total 7.5 6.8 - 8.8 g/dL Final    Alkaline Phosphatase 54 40 - 150 U/L Final    ALT 14 0 - 50 U/L Final    AST 12 0 - 45 U/L Final         5/15/2018 12:16 PM      Component Results     Component Value Ref Range & Units Status    Color Urine Yellow  Final    Appearance Urine Slightly Cloudy  Final    Glucose Urine 150 (A) NEG^Negative mg/dL Final    Bilirubin Urine Negative NEG^Negative Final    Ketones Urine 20 (A) NEG^Negative mg/dL Final    Specific Gravity Urine 1.005 1.003 - 1.035 Final    Blood Urine Negative NEG^Negative Final    pH Urine 6.0 5.0 - 7.0 pH Final    Protein Albumin Urine Negative NEG^Negative mg/dL Final    Urobilinogen mg/dL 0.0 0.0 - 2.0 mg/dL Final    Nitrite Urine Negative NEG^Negative Final    Leukocyte Esterase Urine Negative NEG^Negative Final    Source Midstream Urine  Final    RBC Urine <1 0 - 2 /HPF Final    WBC Urine 1 0 - 5 /HPF Final    Bacteria  "Urine Moderate (A) NEG^Negative /HPF Final    Squamous Epithelial /HPF Urine 13 (H) 0 - 1 /HPF Final    Mucous Urine Present (A) NEG^Negative /LPF Final         5/15/2018 12:46 PM                Thank you for choosing [facility]       Thank you for choosing [facility] for your care. Our goal is always to provide you with excellent care. Hearing back from our patients is one way we can continue to improve our services. Please take a few minutes to complete the written survey that you may receive in the mail after you visit with us. Thank you!        [employer] Information     [employer] lets you send messages to your doctor, view your test results, renew your prescriptions, schedule appointments and more. To sign up, go to [email or web address] . Click on \"Log in\" on the left side of the screen, which will take you to the Welcome page. Then click on \"Sign up Now\" on the right side of the page.     You will be asked to enter the access code listed below, as well as some personal information. Please follow the directions to create your username and password.     Your access code is: [email or web address]  Expires: 2018  9:43 AM     Your access code will  in 90 days. If you need help or a new code, please call your [facility] clinic or [phone].        Care EveryWhere ID     This is your Care EveryWhere ID. This could be used by other organizations to access your [facility] medical records  [id number]        Equal Access to Services     MAYNOR GOLDEN : Hadii miguel Interiano, waaxda luoksanaadaha, qaybta kaalmada satnam, margaux estrada. So [facility] [phone].    ATENCIÓN: Si habla español, tiene a donovan disposición servicios gratuitos de asistencia lingüística. Llame al [phone].    We comply with applicable federal civil rights laws and [place] laws. We do not discriminate on the basis of race, color, national origin, age, disability, sex, sexual orientation, or gender identity.          "   After Visit Summary       This is your record. Keep this with you and show to your community pharmacist(s) and doctor(s) at your next visit.

## 2018-05-15 NOTE — ED NOTES
Resting comfortably. Has tolerated a few sips of water orally, no needs currently. Call light within reach. Pt encouraged to call if anything changes or other needs arise.

## 2018-05-15 NOTE — ED AVS SNAPSHOT
[facility] Emergency Department    [address] [facility] [address]    Phone:  [phone]    Fax:  [phone]                                       Stacie Daniels   MRN: [id number]    Department:  [facility] Emergency Department   Date of Visit:  5/15/2018           After Visit Summary Signature Page     I have received my discharge instructions, and my questions have been answered. I have discussed any challenges I see with this plan with the nurse or doctor.    ..........................................................................................................................................  Patient/Patient Representative Signature      ..........................................................................................................................................  Patient Representative Print Name and Relationship to Patient    ..................................................               ................................................  Date                                            Time    ..........................................................................................................................................  Reviewed by Signature/Title    ...................................................              ..............................................  Date                                                            Time

## 2018-05-16 LAB
BACTERIA SPEC CULT: NORMAL
BACTERIA SPEC CULT: NORMAL
Lab: NORMAL
SPECIMEN SOURCE: NORMAL

## 2018-05-18 ENCOUNTER — TELEPHONE (OUTPATIENT)
Dept: OBGYN | Facility: CLINIC | Age: 28
End: 2018-05-18

## 2018-05-18 DIAGNOSIS — O21.0 HYPEREMESIS ARISING DURING PREGNANCY: Primary | ICD-10-CM

## 2018-05-18 RX ORDER — METOCLOPRAMIDE 5 MG/1
5-10 TABLET ORAL EVERY 6 HOURS PRN
Qty: 240 TABLET | Refills: 1 | Status: ON HOLD | OUTPATIENT
Start: 2018-05-18 | End: 2018-12-22

## 2018-05-18 NOTE — TELEPHONE ENCOUNTER
Patient calling, she was seen at the hospital this week for fluids. She believes her hyperemesis is starting up, and would like a prescription for Reglan sent to the pharmacy for daytime nausea.

## 2018-05-18 NOTE — TELEPHONE ENCOUNTER
Phone call to patient. Patient was seen in [place] ER on 05-15-18 because she was struggling with N & V and it had been 24 hrs since she lasted voided so she went to ER for IVF. Patient stated now she has N & V somewhat controlled and it urinated q8-12h. Patient is tolerating small amts of flds throughout the day and usually one small meal about 1100 each day. Patient is taking Zofran prn and is still struggling with constipation. Patient stated her last BM was 3 days ago. Patient suggested a Reglan Rx. Verified pharmacy. She has not taken the med before but her mother suggested it. Patient will continue to use glycerine suppositories & Colace prn. Patient continues to plan to stated be seen with [facility] on 05-29-18. Explained will get message to covering providers and staff will call back asap. Patient very appreciative of assistance.   Will route to out of office provider pool for review & orders as STEPHANIE Rogel, CNP is out of clinic today. Joi Mckeon RN, HAZEL

## 2018-05-23 ENCOUNTER — APPOINTMENT (OUTPATIENT)
Dept: OBGYN | Facility: CLINIC | Age: 28
End: 2018-05-23
Payer: COMMERCIAL

## 2018-05-23 ENCOUNTER — PRENATAL OFFICE VISIT (OUTPATIENT)
Dept: OBGYN | Facility: CLINIC | Age: 28
End: 2018-05-23
Payer: COMMERCIAL

## 2018-05-23 DIAGNOSIS — Z34.80 PRENATAL CARE, SUBSEQUENT PREGNANCY: Primary | ICD-10-CM

## 2018-05-23 PROCEDURE — 99207 ZZC NO CHARGE NURSE ONLY: CPT | Performed by: OBSTETRICS & GYNECOLOGY

## 2018-05-23 NOTE — MR AVS SNAPSHOT
"              After Visit Summary   5/23/2018    Stacie Daniels    MRN: [id number]           Patient Information     Date Of Birth          1990        Visit Information        Provider Department      5/23/2018 10:00 PM Helen Smallwood MD [facility]        Today's Diagnoses     Prenatal care, subsequent pregnancy    -  1       Follow-ups after your visit        Your next 10 appointments already scheduled     May 29, 2018  9:30 AM CDT   New Prenatal with Helen Smallwood MD, [facility] 2   [facility]    [address] [address]   [phone]              Who to contact     If you have questions or need follow up information about today's clinic visit or your schedule please contact [facility] directly at [phone].  Normal or non-critical lab and imaging results will be communicated to you by MyChart, letter or phone within 4 business days after the clinic has received the results. If you do not hear from us within 7 days, please contact the clinic through MyChart or phone. If you have a critical or abnormal lab result, we will notify you by phone as soon as possible.  Submit refill requests through [employer] or call your pharmacy and they will forward the refill request to us. Please allow 3 business days for your refill to be completed.          Additional Information About Your Visit        MyChart Information     [employer] lets you send messages to your doctor, view your test results, renew your prescriptions, schedule appointments and more. To sign up, go to [email or web address] . Click on \"Log in\" on the left side of the screen, which will take you to the Welcome page. Then click on \"Sign up Now\" on the right side of the page.     You will be asked to enter the access code listed below, as well as some personal information. Please follow the directions to create your username and password.     Your " Pharmacist Admission Medication Reconciliation Pending Note    Prior to Admission Medications were reviewed by the pharmacist and pended for provider review during admission medication reconciliation.    Medications were pended by the pharmacist at this time as follows:    Pended Admission Order Reconciliation Actions     Order Name Action Reordered As    Ascorbic Acid (VITAMIN C) 1000 MG tablet Do Not Order for Admission     ammonium lactate (LAC-HYDRIN) 12 % lotion Order for Admission ammonium lactate (AMLACTIN) 12 % lotion    B Complex Vitamins (VITAMIN B COMPLEX) tablet Do Not Order for Admission     Multiple Vitamins-Minerals (ULTRA MORE GOLD PO) Do Not Order for Admission     Omega-3 Fatty Acids (FISH OIL PO) Do Not Order for Admission     tolterodine (DETROL LA) 4 MG 24 hr capsule Order for Admission tolterodine (DETROL LA) 24 hr capsule 4 mg    efavirenz (SUSTIVA) 600 MG tablet Order for Admission efavirenz (SUSTIVA) capsule 600 mg    tenofovir (VIREAD) 300 MG tablet Order for Admission tenofovir (VIREAD) tablet 300 mg    abacavir (ZIAGEN) 300 MG tablet Order for Admission abacavir (ZIAGEN) tablet 300 mg    atorvastatin (LIPITOR) 40 MG tablet Order for Admission atorvastatin (LIPITOR) tablet 40 mg    cilostazol (PLETAL) 100 MG tablet Order for Admission cilostazol (PLETAL) tablet 100 mg    Coenzyme Q10 (COQ10) 50 MG Cap Do Not Order for Admission     clopidogrel (PLAVIX) 75 MG tablet Order for Admission clopidogrel (PLAVIX) tablet 75 mg    omeprazole (PRILOSEC) 20 MG capsule Order for Admission pantoprazole (PROTONIX) EC tablet 40 mg    sucralfate (CARAFATE) 1 GM/10ML suspension Order for Admission sucralfate (CARAFATE) 1 GM/10ML suspension 1 g    Nutritional Supplements (CARNATION BREAKFAST ESSENTIALS) Liquid Do Not Order for Admission             Orders Pended To Continue For Hospital Stay     ID Description Pended By When Reason    [id number] abacavir (ZIAGEN) tablet 300 mg-EVERY 12 HOURS SCHEDULED Nicky  [facility] 11/08/17 1303     [id number] ammonium lactate (AMLACTIN) 12 % lotion-PRN [facility] 11/08/17 1303     [id number] atorvastatin (LIPITOR) tablet 40 mg-DAILY [facility] 11/08/17 1303     [id number] cilostazol (PLETAL) tablet 100 mg-2 TIMES DAILY BEFORE MEALS [facility] 11/08/17 1303     [id number] clopidogrel (PLAVIX) tablet 75 mg-DAILY [facility] 11/08/17 1303     [id number] efavirenz (SUSTIVA) capsule 600 mg-NIGHTLY [facility] 11/08/17 1303     [id number] pantoprazole (PROTONIX) EC tablet 40 mg-DAILY BEFORE BREAKFAST [facility] 11/08/17 1303     [id number] sucralfate (CARAFATE) 1 GM/10ML suspension 1 g-3 TIMES DAILY PRN [facility] 11/08/17 1303     [id number] tenofovir (VIREAD) tablet 300 mg-DAILY [facility] 11/08/17 1303     [id number] tolterodine (DETROL LA) 24 hr capsule 4 mg-DAILY [facility] 11/08/17 1303             Pharmacist Notations:     Left the following meds unaddressed:    1) Amlodipine (due to patient's low blood pressures).  2) Potassium 99 mg daily (this medication is not on the hospital formulary and an [facility] formulary substitution for this medication does not exist. Also, 99 mg of Potassium is equivalent to only 2.5 mEq of Potassium and patient's Potassium level on admission was 3.2. Will defer to provider).       Last edited by Nicky Jang [facility] on 11/08/17 at 1306          Orders that are ultimately reconciled and signed during admission medication reconciliation may differ from the pended actions above.    Please contact the pharmacist for questions.    Nicky Jang RP  11/8/2017 1:07 PM   access code is: [email or web address]  Expires: 2018  9:43 AM     Your access code will  in 90 days. If you need help or a new code, please call your [place] clinic or [phone].        Care EveryWhere ID     This is your Care EveryWhere ID. This could be used by other organizations to access your [place] medical records  [id number]        Your Vitals Were     Last Period                   (LMP Unknown)            Blood Pressure from Last 3 Encounters:   05/15/18 102/67   18 108/71   18 113/75    Weight from Last 3 Encounters:   05/15/18 54.4 kg (120 lb)   18 53.6 kg (118 lb 3.2 oz)   18 49.9 kg (110 lb)              Today, you had the following     No orders found for display       Primary Care Provider Office Phone # Fax #    Gerry Maxi Barcenas [phone] [address] [address]        Equal Access to Services     [facility]: Hadii aad ku hadasho Soomaali, waaxda luqadaha, qaybta kaalmada adeegyada, margaux ovalle haywin [place] . So [facility] [phone].    ATENCIÓN: Si habla español, tiene a donovan disposición servicios gratuitos de asistencia lingüística. Llame al [phone].    We comply with applicable federal civil rights laws and [place] laws. We do not discriminate on the basis of race, color, national origin, age, disability, sex, sexual orientation, or gender identity.            Thank you!     Thank you for choosing [facility]  for your care. Our goal is always to provide you with excellent care. Hearing back from our patients is one way we can continue to improve our services. Please take a few minutes to complete the written survey that you may receive in the mail after your visit with us. Thank you!             Your Updated Medication List - Protect others around you: Learn how to safely use, store and throw away your medicines at www.disposemymeds.org.          This list is accurate as of 18 10:11 PM.   Always use your most recent med list.                   Brand Name Dispense Instructions for use Diagnosis    metoclopramide 5 MG tablet    REGLAN    240 tablet    Take 1-2 tablets (5-10 mg) by mouth every 6 hours as needed    Hyperemesis arising during pregnancy       ondansetron 8 MG ODT tab    ZOFRAN ODT    60 tablet    Take 1 tablet (8 mg) by mouth 3 times daily (before meals)    Nausea/vomiting in pregnancy       promethazine 25 MG tablet    PHENERGAN    20 tablet    Take 1 tablet (25 mg) by mouth every 6 hours as needed for nausea

## 2018-05-25 ENCOUNTER — HOSPITAL ENCOUNTER (EMERGENCY)
Facility: CLINIC | Age: 28
Discharge: HOME | End: 2018-05-25
Attending: FAMILY MEDICINE | Admitting: FAMILY MEDICINE
Payer: COMMERCIAL

## 2018-05-25 VITALS
OXYGEN SATURATION: 96 % | WEIGHT: 116 LBS | HEART RATE: 93 BPM | SYSTOLIC BLOOD PRESSURE: 111 MMHG | DIASTOLIC BLOOD PRESSURE: 80 MMHG | TEMPERATURE: 98.4 F | RESPIRATION RATE: 16 BRPM | HEIGHT: 62 IN | BODY MASS INDEX: 21.35 KG/M2

## 2018-05-25 DIAGNOSIS — O21.0 HYPEREMESIS GRAVIDARUM: ICD-10-CM

## 2018-05-25 LAB
ALBUMIN SERPL-MCNC: 3.5 G/DL (ref 3.4–5)
ALP SERPL-CCNC: 43 U/L (ref 40–150)
ALT SERPL W P-5'-P-CCNC: 20 U/L (ref 0–50)
ANION GAP SERPL CALCULATED.3IONS-SCNC: 9 MMOL/L (ref 3–14)
AST SERPL W P-5'-P-CCNC: 13 U/L (ref 0–45)
BASOPHILS # BLD AUTO: 0.1 10E9/L (ref 0–0.2)
BASOPHILS NFR BLD AUTO: 0.6 %
BILIRUB SERPL-MCNC: 0.2 MG/DL (ref 0.2–1.3)
BUN SERPL-MCNC: 8 MG/DL (ref 7–30)
CALCIUM SERPL-MCNC: 8.9 MG/DL (ref 8.5–10.1)
CHLORIDE SERPL-SCNC: 105 MMOL/L (ref 94–109)
CO2 SERPL-SCNC: 25 MMOL/L (ref 20–32)
CREAT SERPL-MCNC: 0.62 MG/DL (ref 0.52–1.04)
DIFFERENTIAL METHOD BLD: NORMAL
EOSINOPHIL # BLD AUTO: 0.1 10E9/L (ref 0–0.7)
EOSINOPHIL NFR BLD AUTO: 0.7 %
ERYTHROCYTE [DISTWIDTH] IN BLOOD BY AUTOMATED COUNT: 12.1 % (ref 10–15)
GFR SERPL CREATININE-BSD FRML MDRD: >90 ML/MIN/1.7M2
GLUCOSE SERPL-MCNC: 81 MG/DL (ref 70–99)
HCT VFR BLD AUTO: 37.4 % (ref 35–47)
HGB BLD-MCNC: 12.8 G/DL (ref 11.7–15.7)
IMM GRANULOCYTES # BLD: 0 10E9/L (ref 0–0.4)
IMM GRANULOCYTES NFR BLD: 0.2 %
LYMPHOCYTES # BLD AUTO: 1.8 10E9/L (ref 0.8–5.3)
LYMPHOCYTES NFR BLD AUTO: 20.1 %
MCH RBC QN AUTO: 31.4 PG (ref 26.5–33)
MCHC RBC AUTO-ENTMCNC: 34.2 G/DL (ref 31.5–36.5)
MCV RBC AUTO: 92 FL (ref 78–100)
MONOCYTES # BLD AUTO: 0.5 10E9/L (ref 0–1.3)
MONOCYTES NFR BLD AUTO: 6.2 %
NEUTROPHILS # BLD AUTO: 6.3 10E9/L (ref 1.6–8.3)
NEUTROPHILS NFR BLD AUTO: 72.2 %
PLATELET # BLD AUTO: 206 10E9/L (ref 150–450)
POTASSIUM SERPL-SCNC: 3.8 MMOL/L (ref 3.4–5.3)
PROT SERPL-MCNC: 6.8 G/DL (ref 6.8–8.8)
RBC # BLD AUTO: 4.08 10E12/L (ref 3.8–5.2)
SODIUM SERPL-SCNC: 139 MMOL/L (ref 133–144)
WBC # BLD AUTO: 8.7 10E9/L (ref 4–11)

## 2018-05-25 PROCEDURE — 25000128 H RX IP 250 OP 636: Performed by: FAMILY MEDICINE

## 2018-05-25 PROCEDURE — 99284 EMERGENCY DEPT VISIT MOD MDM: CPT | Mod: 25 | Performed by: FAMILY MEDICINE

## 2018-05-25 PROCEDURE — 85025 COMPLETE CBC W/AUTO DIFF WBC: CPT | Performed by: FAMILY MEDICINE

## 2018-05-25 PROCEDURE — 96374 THER/PROPH/DIAG INJ IV PUSH: CPT | Performed by: FAMILY MEDICINE

## 2018-05-25 PROCEDURE — 96361 HYDRATE IV INFUSION ADD-ON: CPT | Performed by: FAMILY MEDICINE

## 2018-05-25 PROCEDURE — 99284 EMERGENCY DEPT VISIT MOD MDM: CPT | Mod: Z6 | Performed by: FAMILY MEDICINE

## 2018-05-25 PROCEDURE — 80053 COMPREHEN METABOLIC PANEL: CPT | Performed by: FAMILY MEDICINE

## 2018-05-25 RX ORDER — SODIUM CHLORIDE 9 MG/ML
INJECTION, SOLUTION INTRAVENOUS CONTINUOUS
Status: DISCONTINUED | OUTPATIENT
Start: 2018-05-25 | End: 2018-05-25 | Stop reason: HOSPADM

## 2018-05-25 RX ORDER — ONDANSETRON 2 MG/ML
4 INJECTION INTRAMUSCULAR; INTRAVENOUS EVERY 30 MIN PRN
Status: DISCONTINUED | OUTPATIENT
Start: 2018-05-25 | End: 2018-05-25 | Stop reason: HOSPADM

## 2018-05-25 RX ORDER — PROMETHAZINE HYDROCHLORIDE 25 MG/1
25 TABLET ORAL EVERY 6 HOURS PRN
Qty: 20 TABLET | Refills: 0 | Status: ON HOLD | OUTPATIENT
Start: 2018-05-25 | End: 2018-12-22

## 2018-05-25 RX ADMIN — ONDANSETRON 4 MG: 2 INJECTION INTRAMUSCULAR; INTRAVENOUS at 16:47

## 2018-05-25 RX ADMIN — SODIUM CHLORIDE 1000 ML: 9 INJECTION, SOLUTION INTRAVENOUS at 15:48

## 2018-05-25 RX ADMIN — SODIUM CHLORIDE 1000 ML: 9 INJECTION, SOLUTION INTRAVENOUS at 16:47

## 2018-05-25 NOTE — ED PROVIDER NOTES
History     Chief Complaint   Patient presents with     Hyperemesis     HPI  Stacie Daniels is a 28 year old female who presents with concerns of recurrent vomiting in pregnancy.  Patient is about 8-9 weeks along and has had problems with vomiting during her last pregnancy now starting to have the same symptoms.  Patient is already on Zofran and Reglan and Phenergan at home and not much of this is helping much.  She states when she takes the Phenergan that does help but she is too sleepy to care for her child.  Patient denies any blood in her vomit.  She has been a little constipated and has been taking stool softeners but denies any bloody stools or black tarry stools.  Patient denies any dysuria or hematuria.  Patient has not been able to eat or drink much today.    Problem List:    Patient Active Problem List    Diagnosis Date Noted     Prenatal care, subsequent pregnancy 05/23/2018     Priority: Medium     FOB- Crystal Kravik       Panic attacks 05/22/2014     Priority: Medium     PTSD (post-traumatic stress disorder) 10/07/2013     Priority: Medium     Hyperemesis arising during pregnancy 05/01/2013     Priority: Medium     Has PICC line  Daily zofran and fluids  Desires IOL 6/6/2013 pitocin       Sciatica 04/12/2013     Priority: Medium     Generalized anxiety disorder 03/28/2013     Priority: Medium     Diagnosis updated by automated process. Provider to review and confirm.       Supervision of normal first pregnancy 11/26/2012     Priority: Medium     FOB (fiance): Michelleke       CARDIOVASCULAR SCREENING; LDL GOAL LESS THAN 160 02/08/2012     Priority: Medium     ADHD (attention deficit hyperactivity disorder) 01/25/2005     Priority: Medium     Off Concerta          Past Medical History:    Past Medical History:   Diagnosis Date     Anxiety      Chickenpox      Febrile convulsion (H)      Major depressive disorder, recurrent episode, mild (H)      Ulcer (H)        Past Surgical History:    Past Surgical  "History:   Procedure Laterality Date     FOREIGN BODY REMOVAL      metal from her eye as a child       Family History:    Family History   Problem Relation Age of Onset     C.A.D. Father      CANCER Father      lymphoma     Prostate Cancer Father      CANCER Maternal Grandmother      rectal     Depression Maternal Grandmother      CEREBROVASCULAR DISEASE Maternal Grandmother      Prostate Cancer Maternal Grandfather      Breast Cancer Paternal Grandmother      Hypertension Paternal Grandmother      Depression Paternal Grandmother      Thyroid Disease Paternal Grandmother      Hypertension Paternal Grandfather      Depression Mother      Bipolar Disorder Mother      Depression Sister      Bipolar Disorder Sister      Depression Sister      Schizophrenia Maternal Uncle      Suicide Paternal Aunt      DIABETES No family hx of      Glaucoma No family hx of      Macular Degeneration No family hx of        Social History:  Marital Status:  Single [1]  Social History   Substance Use Topics     Smoking status: Former Smoker     Smokeless tobacco: Never Used     Alcohol use No      Comment: rare-quit with pregnancy        Medications:      metoclopramide (REGLAN) 5 MG tablet   ondansetron (ZOFRAN ODT) 8 MG ODT tab   promethazine (PHENERGAN) 25 MG tablet         Review of Systems   All other systems reviewed and are negative.      Physical Exam   BP: 127/75  Pulse: 96  Temp: 98.4  F (36.9  C)  Resp: 16  Height: 157.5 cm (5' 2\")  Weight: 52.6 kg (116 lb)  SpO2: 96 %      Physical Exam   Constitutional: She is oriented to person, place, and time. She appears well-developed and well-nourished. No distress.   HENT:   Mouth/Throat: Oropharynx is clear and moist.   Eyes: Conjunctivae are normal.   Neck: Normal range of motion. Neck supple.   Cardiovascular: Normal rate, regular rhythm, normal heart sounds and intact distal pulses.  Exam reveals no gallop and no friction rub.    No murmur heard.  Pulmonary/Chest: Effort normal and " breath sounds normal. No respiratory distress. She has no wheezes. She has no rales. She exhibits no tenderness.   Abdominal: Soft. Bowel sounds are normal. She exhibits no distension and no mass. There is no tenderness. There is no guarding.   Musculoskeletal: Normal range of motion. She exhibits no edema or tenderness.   Neurological: She is alert and oriented to person, place, and time.   Skin: Skin is warm and dry. No rash noted. She is not diaphoretic.   Psychiatric: She has a normal mood and affect. Judgment normal.   Nursing note and vitals reviewed.      ED Course     ED Course     Procedures           Results for orders placed or performed during the hospital encounter of 05/25/18   CBC with platelets differential   Result Value Ref Range    WBC 8.7 4.0 - 11.0 10e9/L    RBC Count 4.08 3.8 - 5.2 10e12/L    Hemoglobin 12.8 11.7 - 15.7 g/dL    Hematocrit 37.4 35.0 - 47.0 %    MCV 92 78 - 100 fl    MCH 31.4 26.5 - 33.0 pg    MCHC 34.2 31.5 - 36.5 g/dL    RDW 12.1 10.0 - 15.0 %    Platelet Count 206 150 - 450 10e9/L    Diff Method Automated Method     % Neutrophils 72.2 %    % Lymphocytes 20.1 %    % Monocytes 6.2 %    % Eosinophils 0.7 %    % Basophils 0.6 %    % Immature Granulocytes 0.2 %    Absolute Neutrophil 6.3 1.6 - 8.3 10e9/L    Absolute Lymphocytes 1.8 0.8 - 5.3 10e9/L    Absolute Monocytes 0.5 0.0 - 1.3 10e9/L    Absolute Eosinophils 0.1 0.0 - 0.7 10e9/L    Absolute Basophils 0.1 0.0 - 0.2 10e9/L    Abs Immature Granulocytes 0.0 0 - 0.4 10e9/L   Comprehensive metabolic panel   Result Value Ref Range    Sodium 139 133 - 144 mmol/L    Potassium 3.8 3.4 - 5.3 mmol/L    Chloride 105 94 - 109 mmol/L    Carbon Dioxide 25 20 - 32 mmol/L    Anion Gap 9 3 - 14 mmol/L    Glucose 81 70 - 99 mg/dL    Urea Nitrogen 8 7 - 30 mg/dL    Creatinine 0.62 0.52 - 1.04 mg/dL    GFR Estimate >90 >60 mL/min/1.7m2    GFR Estimate If Black >90 >60 mL/min/1.7m2    Calcium 8.9 8.5 - 10.1 mg/dL    Bilirubin Total 0.2 0.2 - 1.3  mg/dL    Albumin 3.5 3.4 - 5.0 g/dL    Protein Total 6.8 6.8 - 8.8 g/dL    Alkaline Phosphatase 43 40 - 150 U/L    ALT 20 0 - 50 U/L    AST 13 0 - 45 U/L     Medications   0.9% sodium chloride BOLUS (1,000 mLs Intravenous New Bag 5/25/18 1647)     Followed by   0.9% sodium chloride BOLUS (0 mLs Intravenous Stopped 5/25/18 1646)     Followed by   sodium chloride 0.9% infusion (not administered)   ondansetron (ZOFRAN) injection 4 mg (4 mg Intravenous Given 5/25/18 1647)     Labs are reviewed and were unremarkable.  Patient feels better after fluids and Zofran was given.  At this point I think it is safe to discharge the patient home.  Patient will continue to use the Reglan and Zofran as previously directed.  It seems that she does get better relief from the Phenergan so I recommend that she might want to use the Phenergan instead of the Reglan to see if this helps better.  She was given a new prescription for this.  Patient will follow-up with her OB doctor next week for follow-up.    Assessments & Plan (with Medical Decision Making)  Hyperemesis gravidarum     I have reviewed the nursing notes.    I have reviewed the findings, diagnosis, plan and need for follow up with the patient.      5/25/2018   [facility] EMERGENCY DEPARTMENT     Melvin Hernandez MD  05/25/18 9961

## 2018-05-25 NOTE — ED AVS SNAPSHOT
[facility] Emergency Department    [address] [place] [address]    Phone:  [phone]    Fax:  [phone]                                       Stacie Daniels   MRN: [id number]    Department:  [facility] Emergency Department   Date of Visit:  5/25/2018           Patient Information     Date Of Birth          1990        Your diagnoses for this visit were:     Hyperemesis gravidarum        You were seen by Melvin Hernandez MD.      Follow-up Information     Schedule an appointment as soon as possible for a visit with Kimberley De La Cruz APRN CNP.    Specialty:  Nurse Practitioner - Women's Health    Why:  For follow up on your ED stay    Contact information:    [address] [facility] [id number] [phone]        Discharge References/Attachments     HYPEREMESIS GRAVIDARUM (ENGLISH)    (S) EATING TIPS FOR MORNING SICKNESS: HYPEREMESIS GRAVIDARUM (ENGLISH)      Your next 10 appointments already scheduled     May 29, 2018  9:30 AM CDT   New Prenatal with Helen Smallwood MD, [facility] 2   [facility]    [address] [address] [phone]              24 Hour Appointment Hotline       To make an appointment at any [facility], call [phone] ([phone]). If you don't have a family doctor or clinic, we will help you find one. [facility] are conveniently located to serve the needs of you and your family.             Review of your medicines      Our records show that you are taking the medicines listed below. If these are incorrect, please call your family doctor or clinic.        Dose / Directions Last dose taken    metoclopramide 5 MG tablet   Commonly known as:  REGLAN   Dose:  5-10 mg   Quantity:  240 tablet        Take 1-2 tablets (5-10 mg) by mouth every 6 hours as needed   Refills:  1        ondansetron 8 MG ODT tab   Commonly known as:  ZOFRAN ODT   Dose:  8 mg    Quantity:  60 tablet        Take 1 tablet (8 mg) by mouth 3 times daily (before meals)   Refills:  1        promethazine 25 MG tablet   Commonly known as:  PHENERGAN   Dose:  25 mg   Quantity:  20 tablet        Take 1 tablet (25 mg) by mouth every 6 hours as needed for nausea   Refills:  0                Prescriptions were sent or printed at these locations (1 Prescription)                   [facility] [place] - [address]    [address] , [facility] [address]    Telephone:  [phone]   Fax:  [phone]   Hours:                  E-Prescribed (1 of 1)         promethazine (PHENERGAN) 25 MG tablet                Procedures and tests performed during your visit     CBC with platelets differential    Comprehensive metabolic panel    Peripheral IV catheter      Orders Needing Specimen Collection     None      Pending Results     No orders found from 5/23/2018 to 5/26/2018.            Pending Culture Results     No orders found from 5/23/2018 to 5/26/2018.            Pending Results Instructions     If you had any lab results that were not finalized at the time of your Discharge, you can call the ED Lab Result RN at [phone]. You will be contacted by this team for any positive Lab results or changes in treatment. The nurses are available 7 days a week from 10A to 6:30P.  You can leave a message 24 hours per day and they will return your call.        Thank you for choosing [facility]       Thank you for choosing [facility] for your care. Our goal is always to provide you with excellent care. Hearing back from our patients is one way we can continue to improve our services. Please take a few minutes to complete the written survey that you may receive in the mail after you visit with us. Thank you!        [employer] Information     [employer] lets you send messages to your doctor, view your test results, renew your prescriptions, schedule appointments and more. To sign up, go to  [email or web address] . Click on \"Log in\" on the left side of the screen, which will take you to the Welcome page. Then click on \"Sign up Now\" on the right side of the page.     You will be asked to enter the access code listed below, as well as some personal information. Please follow the directions to create your username and password.     Your access code is: [email or web address]  Expires: 2018  9:43 AM     Your access code will  in 90 days. If you need help or a new code, please call your [place] clinic or [phone].        Care EveryWhere ID     This is your Care EveryWhere ID. This could be used by other organizations to access your [place] medical records  [id number]        Equal Access to Services     MAYNOR GOLDEN : Eitan Interiano, gonzales kaur, rosalino hay, margaux estrada. So [facility] [phone].    ATENCIÓN: Si habla español, tiene a donovan disposición servicios gratuitos de asistencia lingüística. Simone al [phone].    We comply with applicable federal civil rights laws and [place] laws. We do not discriminate on the basis of race, color, national origin, age, disability, sex, sexual orientation, or gender identity.            After Visit Summary       This is your record. Keep this with you and show to your community pharmacist(s) and doctor(s) at your next visit.                  "

## 2018-05-25 NOTE — ED AVS SNAPSHOT
[facility] Emergency Department    [address] [place] [address]    Phone:  [phone]    Fax:  [phone]                                       Stacie Daniels   MRN: [id number]    Department:  [facility] Emergency Department   Date of Visit:  5/25/2018           After Visit Summary Signature Page     I have received my discharge instructions, and my questions have been answered. I have discussed any challenges I see with this plan with the nurse or doctor.    ..........................................................................................................................................  Patient/Patient Representative Signature      ..........................................................................................................................................  Patient Representative Print Name and Relationship to Patient    ..................................................               ................................................  Date                                            Time    ..........................................................................................................................................  Reviewed by Signature/Title    ...................................................              ..............................................  Date                                                            Time

## 2018-05-28 ENCOUNTER — HOSPITAL ENCOUNTER (EMERGENCY)
Facility: CLINIC | Age: 28
Discharge: HOME | End: 2018-05-28
Attending: PHYSICIAN ASSISTANT | Admitting: PHYSICIAN ASSISTANT
Payer: COMMERCIAL

## 2018-05-28 VITALS
RESPIRATION RATE: 14 BRPM | TEMPERATURE: 98.4 F | SYSTOLIC BLOOD PRESSURE: 115 MMHG | HEART RATE: 80 BPM | OXYGEN SATURATION: 100 % | WEIGHT: 116 LBS | HEIGHT: 62 IN | BODY MASS INDEX: 21.35 KG/M2 | DIASTOLIC BLOOD PRESSURE: 75 MMHG

## 2018-05-28 DIAGNOSIS — O21.0 HYPEREMESIS GRAVIDARUM: ICD-10-CM

## 2018-05-28 PROCEDURE — 96374 THER/PROPH/DIAG INJ IV PUSH: CPT | Performed by: PHYSICIAN ASSISTANT

## 2018-05-28 PROCEDURE — 96361 HYDRATE IV INFUSION ADD-ON: CPT | Performed by: PHYSICIAN ASSISTANT

## 2018-05-28 PROCEDURE — 25000128 H RX IP 250 OP 636: Performed by: PHYSICIAN ASSISTANT

## 2018-05-28 PROCEDURE — 99284 EMERGENCY DEPT VISIT MOD MDM: CPT | Mod: Z6 | Performed by: PHYSICIAN ASSISTANT

## 2018-05-28 PROCEDURE — 99284 EMERGENCY DEPT VISIT MOD MDM: CPT | Mod: 25 | Performed by: PHYSICIAN ASSISTANT

## 2018-05-28 RX ORDER — SODIUM CHLORIDE 9 MG/ML
INJECTION, SOLUTION INTRAVENOUS ONCE
Status: COMPLETED | OUTPATIENT
Start: 2018-05-28 | End: 2018-05-28

## 2018-05-28 RX ORDER — ONDANSETRON 2 MG/ML
4 INJECTION INTRAMUSCULAR; INTRAVENOUS EVERY 30 MIN PRN
Status: DISCONTINUED | OUTPATIENT
Start: 2018-05-28 | End: 2018-05-28 | Stop reason: HOSPADM

## 2018-05-28 RX ADMIN — ONDANSETRON 4 MG: 2 INJECTION INTRAMUSCULAR; INTRAVENOUS at 15:59

## 2018-05-28 RX ADMIN — SODIUM CHLORIDE: 9 INJECTION, SOLUTION INTRAVENOUS at 16:02

## 2018-05-28 RX ADMIN — SODIUM CHLORIDE 1000 ML: 9 INJECTION, SOLUTION INTRAVENOUS at 14:49

## 2018-05-28 NOTE — ED TRIAGE NOTES
Pt here with pregnancy induced emesis. She is 9 weeks pregnant. She was seen and go fluids 3 days ago.

## 2018-05-28 NOTE — ED PROVIDER NOTES
History     Chief Complaint   Patient presents with     Emesis During Pregnancy     HPI  Stacie Daniels is a 28 year old female who presents for evaluation of nausea and vomiting ever since onset of her second pregnancy. She is currently 9 weeks gestation based on LMP. She has an OB/GYN visit tomorrow.  She was evaluated in the ED for the same concern on 18 and received IV fluids and Zofran with good success. She reports that symptoms returned yesterday, and she has been vomiting about every 30-45 minutes. Troubles keeping food and fluid down. She denies any abdominal pain, pelvic pain, vaginal bleeding, hematemesis, or bowel/bladder symptoms. Denies any dysuria, frequency, urgency, hematuria, diarrhea, or hematochezia.      Obstetric History       T1      L1     SAB2   TAB0   Ectopic0   Multiple0   Live Births1       # Outcome Date GA Lbr Gonsalo/2nd Weight Sex Delivery Anes PTL Lv   4 Current            3 Term 13 38w6d 06:55 / 01:54 3.487 kg (7 lb 11 oz) F Vag-Spont EPI N YOLANDA      Name: Elwinia      Apgar1:  8                Apgar5: 9   2 SAB 07/10/12 5w0d    SAB      1 SAB 10/10/07 5w0d    SAB                  Problem List:    Patient Active Problem List    Diagnosis Date Noted     Prenatal care, subsequent pregnancy 2018     Priority: Medium     FOB- Crystal Kravik       Panic attacks 2014     Priority: Medium     PTSD (post-traumatic stress disorder) 10/07/2013     Priority: Medium     Hyperemesis arising during pregnancy 2013     Priority: Medium     Has PICC line  Daily zofran and fluids  Desires IOL 2013 pitocin       Sciatica 2013     Priority: Medium     Generalized anxiety disorder 2013     Priority: Medium     Diagnosis updated by automated process. Provider to review and confirm.       Supervision of normal first pregnancy 2012     Priority: Medium     FOB (fiance): Ean       CARDIOVASCULAR SCREENING; LDL GOAL LESS THAN 160 2012      "Priority: Medium     ADHD (attention deficit hyperactivity disorder) 01/25/2005     Priority: Medium     Off Concerta          Past Medical History:    Past Medical History:   Diagnosis Date     Anxiety      Chickenpox      Febrile convulsion (H)      Major depressive disorder, recurrent episode, mild (H)      Ulcer (H)        Past Surgical History:    Past Surgical History:   Procedure Laterality Date     FOREIGN BODY REMOVAL      metal from her eye as a child       Family History:    Family History   Problem Relation Age of Onset     C.A.D. Father      CANCER Father      lymphoma     Prostate Cancer Father      CANCER Maternal Grandmother      rectal     Depression Maternal Grandmother      CEREBROVASCULAR DISEASE Maternal Grandmother      Prostate Cancer Maternal Grandfather      Breast Cancer Paternal Grandmother      Hypertension Paternal Grandmother      Depression Paternal Grandmother      Thyroid Disease Paternal Grandmother      Hypertension Paternal Grandfather      Depression Mother      Bipolar Disorder Mother      Depression Sister      Bipolar Disorder Sister      Depression Sister      Schizophrenia Maternal Uncle      Suicide Paternal Aunt      DIABETES No family hx of      Glaucoma No family hx of      Macular Degeneration No family hx of        Social History:  Marital Status:  Single [1]  Social History   Substance Use Topics     Smoking status: Former Smoker     Smokeless tobacco: Never Used     Alcohol use No      Comment: rare-quit with pregnancy        Medications:      metoclopramide (REGLAN) 5 MG tablet   ondansetron (ZOFRAN ODT) 8 MG ODT tab   promethazine (PHENERGAN) 25 MG tablet         Review of Systems   All other systems reviewed and are negative.      Physical Exam   BP: 115/83  Pulse: 79  Temp: 98.4  F (36.9  C)  Resp: 12  Height: 157.5 cm (5' 2\")  Weight: 52.6 kg (116 lb)  SpO2: 100 %      Physical Exam   Nursing note and vitals reviewed.  Generally healthy appearing female in NAD " who is active and non-toxic appearing.   Head: Normocephalic, atraumatic, nontender to palpation  Eyes: PERRLA, conjunctiva and sclera clear  Ears: Bilateral TM's and canals are clear.  TM's translucent without erythema or effusion.  Nose: Nares normal and patent bilaterally.  Mucous membranes are non-erythematous and non-edematous.  No sinus tenderness.  Throat: Dry oral mucous membranes. No tonsilar hypertrophy, exudate, or erythema.  Neck: Supple.  FROM without pain.  No adenopathy.  No thyromegaly.   Heart:  RRR with normal S1 and S2.  No S3 or S4.  No murmur, rub, gallop, or click.  PMI is nondisplaced.   Lungs:  CTA bilaterally without wheezes, rales, or rhonchi.  Good breath sounds heard throughout all lung fields.  Tympanitic to percussion with no areas of dullness.   Abdomen: Positive bowel sounds in all four quadrants.  No tenderness to palpation throughout.  No rebound and no guarding.  No hepatosplenomegaly.  No masses.         ED Course     ED Course     Procedures               Critical Care time:  none     No results found for this or any previous visit (from the past 24 hour(s)).    Medications   sodium chloride (PF) 0.9% PF flush 3 mL (not administered)   ondansetron (ZOFRAN) injection 4 mg (4 mg Intravenous Given 18 1559)   sodium chloride 0.9% infusion ( Intravenous Stopped 18 1717)   0.9% sodium chloride BOLUS (0 mLs Intravenous Stopped 18 1602)       Assessments & Plan (with Medical Decision Making)  Hyperemesis gravidarum     28 year old female  0-1 currently 9 weeks gestation based on LMP who presents for an exacerbation of her previously noted hyperemesis gravidarum. She suffered with this throughout her entire first pregnancy and ultimately ended up with a PICC line at that time. She was seen in the ED 3 days prior for similar concerns and did feel improved with IV fluid supplementation and Zofran. She reports symptoms escalated again yesterday and she has been vomiting  every 30-45 minutes. She denies any abdominal pain, pelvic pain, vaginal discharge change, for vaginal bleeding. On exam blood pressure 115/83, pulse 79, temperature 98.4. No abdominal discomfort on exam. Dry oral mucous membranes.  No other abnormalities on exam. No abdominal tenderness. Lab levels were not obtained as blood could not be drawn with IV start per nursing report. Also, the patient has a past history of hyperemesis gravidarum, and does not have any new concerning symptoms. She also does not have any abdominal discomfort on exam, does not have any change in her 2 L of vaginal discharge and has not noted any vaginal bleeding.  IV normal saline provided along with IV Zofran. Patient noted significant improvement in her symptoms and was requesting discharge. She has a follow-up appointment with her regular OB/GYN tomorrow, and will keep that appointment. Return to the ED prior to then if having further issues. Patient was in agreement with this plan and was suitable for discharge.      I have reviewed the nursing notes.    I have reviewed the findings, diagnosis, plan and need for follow up with the patient.       Discharge Medication List as of 5/28/2018  5:21 PM          Final diagnoses:   Hyperemesis gravidarum       Disclaimer: This note consists of symbols derived from keyboarding, dictation and/or voice recognition software. As a result, there may be errors in the script that have gone undetected. Please consider this when interpreting information found in this chart.    5/28/2018   Margarito Pulido PA-C   [facility] EMERGENCY DEPARTMENT     Margarito Pulido PA-C  05/28/18 1734

## 2018-05-28 NOTE — ED AVS SNAPSHOT
[facility] Emergency Department    [address] [place] [address]    Phone:  [phone]    Fax:  [phone]                                       Stacie Daniels   MRN: [id number]    Department:  [facility] Emergency Department   Date of Visit:  5/28/2018           Patient Information     Date Of Birth          1990        Your diagnoses for this visit were:     Hyperemesis gravidarum        You were seen by Margarito Pulido PA-C.      Follow-up Information     Follow up with [facility] Emergency Department.    Specialty:  EMERGENCY MEDICINE    Why:  As needed, If symptoms worsen    Contact information:    [facility]   [place] [address] [phone]    Additional information:    From [address]: Exit at [address] on south side of [place]. Turn right on [address]. Turn left at stoplight on [address]. [facility] will be in view two blocks ahead        Discharge Instructions       It was a pleasure working with you today!  I hope the rest of your pregnancy goes very well!  :)        Hyperemesis Gravidarum  Hyperemesis gravidarum is a severe form of morning sickness that can affect some women during pregnancy. It may develop around the 5th week and last until the 16th week of pregnancy. In some women, it may last longer. Symptoms include severe nausea and vomiting. This can lead to problems such as as weight loss and dehydration.  It is not clear what causes hyperemesis gravidarum. It may be due to rising hormone levels early in the pregnancy. It can be a serious threat to mother and fetus, if symptoms are severe. Therefore, follow the advice below carefully. If symptoms are not controlled by home measures, a hospital stay may be needed. IV (intravenous) fluids and medicines may be given.  Home care    Diet  ? Keep a log of the foods you eat and how they affect your symptoms. Avoid foods that trigger your symptoms.  ? Eat  frequent small meals throughout the day rather than three large meals. This can help keep the stomach from being empty, which can make nausea worse.  ? Choose foods that are high in carbohydrates. Eating foods high in protein may also help. Limit greasy or spicy foods.  ? Before getting out of bed in the morning, try eating crackers or dry toast. This may help settle your stomach.  ? Drink cold, clear liquids. Drinking small amounts of liquids with electrolytes, such as sports drinks may help as well.    Medicine  ? If needed, your healthcare provider may prescribe certain medicines to help relieve nausea and vomiting. Vitamin B6 and margaret may also be advised. Don t try any over-the-counter medicines or home remedies without talking to your provider first.  Follow-up care  Follow up with your healthcare provider, or as advised.  When to seek medical advice  Call your healthcare provider right away if any of these occur:    Signs of dehydration (dry mouth, extreme thirst, dark urine or little urine output, dizziness, weakness, or fainting)    Vomiting that won t stop    Inability to keep down liquids    Frequent diarrhea    Weight loss or no weight gain over a 2-week period    Severe constant pain in the lower right abdomen    Fever of 100.4 F (38 C) or higher, or as directed by your provider  Date Last Reviewed: 8/20/2015 2000-2017 [employer]. [address] [place] [address]. All rights reserved. This information is not intended as a substitute for professional medical care. Always follow your healthcare professional's instructions.          Your next 10 appointments already scheduled     May 29, 2018  9:30 AM CDT   New Prenatal with Helen Smallwood MD, [facility]   [facility]    [address] [place] [address]   [phone]              24 Hour Appointment Hotline       To make an appointment at any [facility], call  [phone] ([phone]). If you don't have a family doctor or clinic, we will help you find one. [facility] clinics are conveniently located to serve the needs of you and your family.             Review of your medicines      Our records show that you are taking the medicines listed below. If these are incorrect, please call your family doctor or clinic.        Dose / Directions Last dose taken    metoclopramide 5 MG tablet   Commonly known as:  REGLAN   Dose:  5-10 mg   Quantity:  240 tablet        Take 1-2 tablets (5-10 mg) by mouth every 6 hours as needed   Refills:  1        ondansetron 8 MG ODT tab   Commonly known as:  ZOFRAN ODT   Dose:  8 mg   Quantity:  60 tablet        Take 1 tablet (8 mg) by mouth 3 times daily (before meals)   Refills:  1        promethazine 25 MG tablet   Commonly known as:  PHENERGAN   Dose:  25 mg   Quantity:  20 tablet        Take 1 tablet (25 mg) by mouth every 6 hours as needed for nausea   Refills:  0                Procedures and tests performed during your visit     Peripheral IV: Standard      Orders Needing Specimen Collection     None      Pending Results     No orders found from 5/26/2018 to 5/29/2018.            Pending Culture Results     No orders found from 5/26/2018 to 5/29/2018.            Pending Results Instructions     If you had any lab results that were not finalized at the time of your Discharge, you can call the ED Lab Result RN at [phone]. You will be contacted by this team for any positive Lab results or changes in treatment. The nurses are available 7 days a week from 10A to 6:30P.  You can leave a message 24 hours per day and they will return your call.        Thank you for choosing [facility]       Thank you for choosing [facility] for your care. Our goal is always to provide you with excellent care. Hearing back from our patients is one way we can continue to improve our services. Please take a few minutes to complete the written survey that you may  "receive in the mail after you visit with us. Thank you!        [employer] Information     [employer] lets you send messages to your doctor, view your test results, renew your prescriptions, schedule appointments and more. To sign up, go to [email or web address] . Click on \"Log in\" on the left side of the screen, which will take you to the Welcome page. Then click on \"Sign up Now\" on the right side of the page.     You will be asked to enter the access code listed below, as well as some personal information. Please follow the directions to create your username and password.     Your access code is: [email or web address]  Expires: 2018  9:43 AM     Your access code will  in 90 days. If you need help or a new code, please call your [place] clinic or [phone].        Care EveryWhere ID     This is your Care EveryWhere ID. This could be used by other organizations to access your [place] medical records  [id number]        Equal Access to Services     FRANNIE GOLDEN : Hadii miguel remyo [facility], waaxda luqadaha, qaybta kaalmada aderadha, margaux [place] . So [facility] [phone].    ATENCIÓN: Si habla español, tiene a donovan disposición servicios gratuitos de asistencia lingüística. Llame al [phone].    We comply with applicable federal civil rights laws and [place] laws. We do not discriminate on the basis of race, color, national origin, age, disability, sex, sexual orientation, or gender identity.            After Visit Summary       This is your record. Keep this with you and show to your community pharmacist(s) and doctor(s) at your next visit.                  "

## 2018-05-28 NOTE — DISCHARGE INSTRUCTIONS
It was a pleasure working with you today!  I hope the rest of your pregnancy goes very well!  :)        Hyperemesis Gravidarum  Hyperemesis gravidarum is a severe form of morning sickness that can affect some women during pregnancy. It may develop around the 5th week and last until the 16th week of pregnancy. In some women, it may last longer. Symptoms include severe nausea and vomiting. This can lead to problems such as as weight loss and dehydration.  It is not clear what causes hyperemesis gravidarum. It may be due to rising hormone levels early in the pregnancy. It can be a serious threat to mother and fetus, if symptoms are severe. Therefore, follow the advice below carefully. If symptoms are not controlled by home measures, a hospital stay may be needed. IV (intravenous) fluids and medicines may be given.  Home care    Diet  ? Keep a log of the foods you eat and how they affect your symptoms. Avoid foods that trigger your symptoms.  ? Eat frequent small meals throughout the day rather than three large meals. This can help keep the stomach from being empty, which can make nausea worse.  ? Choose foods that are high in carbohydrates. Eating foods high in protein may also help. Limit greasy or spicy foods.  ? Before getting out of bed in the morning, try eating crackers or dry toast. This may help settle your stomach.  ? Drink cold, clear liquids. Drinking small amounts of liquids with electrolytes, such as sports drinks may help as well.    Medicine  ? If needed, your healthcare provider may prescribe certain medicines to help relieve nausea and vomiting. Vitamin B6 and margaret may also be advised. Don t try any over-the-counter medicines or home remedies without talking to your provider first.  Follow-up care  Follow up with your healthcare provider, or as advised.  When to seek medical advice  Call your healthcare provider right away if any of these occur:    Signs of dehydration (dry mouth, extreme thirst,  dark urine or little urine output, dizziness, weakness, or fainting)    Vomiting that won t stop    Inability to keep down liquids    Frequent diarrhea    Weight loss or no weight gain over a 2-week period    Severe constant pain in the lower right abdomen    Fever of 100.4 F (38 C) or higher, or as directed by your provider  Date Last Reviewed: 8/20/2015 2000-2017 [employer]. [address] [place] [address]. All rights reserved. This information is not intended as a substitute for professional medical care. Always follow your healthcare professional's instructions.

## 2018-05-28 NOTE — ED AVS SNAPSHOT
[facility] Emergency Department    [address] [place] [address]    Phone:  [phone]    Fax:  [phone]                                       Stacie Daniels   MRN: [id number]    Department:  [facility] Emergency Department   Date of Visit:  5/28/2018           After Visit Summary Signature Page     I have received my discharge instructions, and my questions have been answered. I have discussed any challenges I see with this plan with the nurse or doctor.    ..........................................................................................................................................  Patient/Patient Representative Signature      ..........................................................................................................................................  Patient Representative Print Name and Relationship to Patient    ..................................................               ................................................  Date                                            Time    ..........................................................................................................................................  Reviewed by Signature/Title    ...................................................              ..............................................  Date                                                            Time

## 2018-05-29 ENCOUNTER — HOSPITAL ENCOUNTER (OUTPATIENT)
Facility: CLINIC | Age: 28
Setting detail: OBSERVATION
Discharge: HOME | End: 2018-05-30
Attending: OBSTETRICS & GYNECOLOGY | Admitting: OBSTETRICS & GYNECOLOGY
Payer: COMMERCIAL

## 2018-05-29 ENCOUNTER — PRENATAL OFFICE VISIT (OUTPATIENT)
Dept: OBGYN | Facility: CLINIC | Age: 28
End: 2018-05-29
Payer: COMMERCIAL

## 2018-05-29 ENCOUNTER — APPOINTMENT (OUTPATIENT)
Dept: GENERAL RADIOLOGY | Facility: CLINIC | Age: 28
End: 2018-05-29
Attending: OBSTETRICS & GYNECOLOGY
Payer: COMMERCIAL

## 2018-05-29 VITALS
BODY MASS INDEX: 21.12 KG/M2 | HEART RATE: 86 BPM | HEIGHT: 62 IN | DIASTOLIC BLOOD PRESSURE: 81 MMHG | WEIGHT: 114.8 LBS | TEMPERATURE: 98.9 F | RESPIRATION RATE: 16 BRPM | SYSTOLIC BLOOD PRESSURE: 120 MMHG

## 2018-05-29 DIAGNOSIS — O21.0 HYPEREMESIS ARISING DURING PREGNANCY: ICD-10-CM

## 2018-05-29 DIAGNOSIS — Z34.80 PRENATAL CARE OF MULTIGRAVIDA, ANTEPARTUM: Primary | ICD-10-CM

## 2018-05-29 DIAGNOSIS — R11.15 INTRACTABLE CYCLICAL VOMITING WITH NAUSEA: Primary | ICD-10-CM

## 2018-05-29 PROBLEM — R11.10 HYPEREMESIS: Status: ACTIVE | Noted: 2018-05-29

## 2018-05-29 LAB
ABO + RH BLD: NORMAL
ABO + RH BLD: NORMAL
ALBUMIN SERPL-MCNC: 3.4 G/DL (ref 3.4–5)
ALP SERPL-CCNC: 45 U/L (ref 40–150)
ALT SERPL W P-5'-P-CCNC: 22 U/L (ref 0–50)
ANION GAP SERPL CALCULATED.3IONS-SCNC: 12 MMOL/L (ref 3–14)
AST SERPL W P-5'-P-CCNC: 12 U/L (ref 0–45)
BASOPHILS # BLD AUTO: 0 10E9/L (ref 0–0.2)
BASOPHILS NFR BLD AUTO: 0.4 %
BILIRUB SERPL-MCNC: 0.4 MG/DL (ref 0.2–1.3)
BLD GP AB SCN SERPL QL: NORMAL
BLOOD BANK CMNT PATIENT-IMP: NORMAL
BUN SERPL-MCNC: 7 MG/DL (ref 7–30)
CALCIUM SERPL-MCNC: 8.3 MG/DL (ref 8.5–10.1)
CHLORIDE SERPL-SCNC: 107 MMOL/L (ref 94–109)
CO2 SERPL-SCNC: 19 MMOL/L (ref 20–32)
CREAT SERPL-MCNC: 0.55 MG/DL (ref 0.52–1.04)
DIFFERENTIAL METHOD BLD: NORMAL
EOSINOPHIL # BLD AUTO: 0 10E9/L (ref 0–0.7)
EOSINOPHIL NFR BLD AUTO: 0.4 %
ERYTHROCYTE [DISTWIDTH] IN BLOOD BY AUTOMATED COUNT: 11.4 % (ref 10–15)
GFR SERPL CREATININE-BSD FRML MDRD: >90 ML/MIN/1.7M2
GLUCOSE SERPL-MCNC: 60 MG/DL (ref 70–99)
HCT VFR BLD AUTO: 36.7 % (ref 35–47)
HGB BLD-MCNC: 12.6 G/DL (ref 11.7–15.7)
IMM GRANULOCYTES # BLD: 0 10E9/L (ref 0–0.4)
IMM GRANULOCYTES NFR BLD: 0.3 %
LYMPHOCYTES # BLD AUTO: 1.4 10E9/L (ref 0.8–5.3)
LYMPHOCYTES NFR BLD AUTO: 17.7 %
MCH RBC QN AUTO: 31.1 PG (ref 26.5–33)
MCHC RBC AUTO-ENTMCNC: 34.3 G/DL (ref 31.5–36.5)
MCV RBC AUTO: 91 FL (ref 78–100)
MONOCYTES # BLD AUTO: 0.4 10E9/L (ref 0–1.3)
MONOCYTES NFR BLD AUTO: 4.5 %
NEUTROPHILS # BLD AUTO: 6 10E9/L (ref 1.6–8.3)
NEUTROPHILS NFR BLD AUTO: 76.7 %
PLATELET # BLD AUTO: 189 10E9/L (ref 150–450)
POTASSIUM SERPL-SCNC: 3.9 MMOL/L (ref 3.4–5.3)
PROT SERPL-MCNC: 6.5 G/DL (ref 6.8–8.8)
RBC # BLD AUTO: 4.05 10E12/L (ref 3.8–5.2)
SODIUM SERPL-SCNC: 138 MMOL/L (ref 133–144)
SPECIMEN EXP DATE BLD: NORMAL
WBC # BLD AUTO: 7.9 10E9/L (ref 4–11)

## 2018-05-29 PROCEDURE — 80053 COMPREHEN METABOLIC PANEL: CPT | Performed by: OBSTETRICS & GYNECOLOGY

## 2018-05-29 PROCEDURE — 96375 TX/PRO/DX INJ NEW DRUG ADDON: CPT

## 2018-05-29 PROCEDURE — 86780 TREPONEMA PALLIDUM: CPT | Performed by: OBSTETRICS & GYNECOLOGY

## 2018-05-29 PROCEDURE — 96374 THER/PROPH/DIAG INJ IV PUSH: CPT

## 2018-05-29 PROCEDURE — G0378 HOSPITAL OBSERVATION PER HR: HCPCS

## 2018-05-29 PROCEDURE — 87340 HEPATITIS B SURFACE AG IA: CPT | Performed by: OBSTETRICS & GYNECOLOGY

## 2018-05-29 PROCEDURE — 86762 RUBELLA ANTIBODY: CPT | Performed by: OBSTETRICS & GYNECOLOGY

## 2018-05-29 PROCEDURE — 87389 HIV-1 AG W/HIV-1&-2 AB AG IA: CPT | Performed by: OBSTETRICS & GYNECOLOGY

## 2018-05-29 PROCEDURE — 40000986 XR CHEST PORT 1 VW

## 2018-05-29 PROCEDURE — 25000132 ZZH RX MED GY IP 250 OP 250 PS 637: Performed by: OBSTETRICS & GYNECOLOGY

## 2018-05-29 PROCEDURE — 85025 COMPLETE CBC W/AUTO DIFF WBC: CPT | Performed by: OBSTETRICS & GYNECOLOGY

## 2018-05-29 PROCEDURE — 96376 TX/PRO/DX INJ SAME DRUG ADON: CPT

## 2018-05-29 PROCEDURE — 99207 ZZC FIRST OB VISIT: CPT | Performed by: OBSTETRICS & GYNECOLOGY

## 2018-05-29 PROCEDURE — 25800025 ZZH RX 258: Performed by: OBSTETRICS & GYNECOLOGY

## 2018-05-29 PROCEDURE — 36569 INSJ PICC 5 YR+ W/O IMAGING: CPT

## 2018-05-29 PROCEDURE — 36415 COLL VENOUS BLD VENIPUNCTURE: CPT | Performed by: OBSTETRICS & GYNECOLOGY

## 2018-05-29 PROCEDURE — 25000125 ZZHC RX 250: Performed by: OBSTETRICS & GYNECOLOGY

## 2018-05-29 PROCEDURE — 86850 RBC ANTIBODY SCREEN: CPT | Performed by: OBSTETRICS & GYNECOLOGY

## 2018-05-29 PROCEDURE — 86900 BLOOD TYPING SEROLOGIC ABO: CPT | Performed by: OBSTETRICS & GYNECOLOGY

## 2018-05-29 PROCEDURE — 27211136 ZZH TRAY POWER PICC SOLO 4FR SINGLE LUMEN

## 2018-05-29 PROCEDURE — 86901 BLOOD TYPING SEROLOGIC RH(D): CPT | Performed by: OBSTETRICS & GYNECOLOGY

## 2018-05-29 PROCEDURE — 25000128 H RX IP 250 OP 636: Performed by: OBSTETRICS & GYNECOLOGY

## 2018-05-29 RX ORDER — PROMETHAZINE HYDROCHLORIDE 25 MG/ML
25 INJECTION, SOLUTION INTRAMUSCULAR; INTRAVENOUS EVERY 6 HOURS PRN
Status: CANCELLED
Start: 2018-05-29

## 2018-05-29 RX ORDER — METOCLOPRAMIDE HYDROCHLORIDE 5 MG/ML
10 INJECTION INTRAMUSCULAR; INTRAVENOUS EVERY 6 HOURS PRN
Status: DISCONTINUED | OUTPATIENT
Start: 2018-05-29 | End: 2018-05-30 | Stop reason: HOSPADM

## 2018-05-29 RX ORDER — HEPARIN SODIUM,PORCINE 10 UNIT/ML
2-5 VIAL (ML) INTRAVENOUS
Status: DISCONTINUED | OUTPATIENT
Start: 2018-05-29 | End: 2018-05-30 | Stop reason: HOSPADM

## 2018-05-29 RX ORDER — PROCHLORPERAZINE 25 MG
25 SUPPOSITORY, RECTAL RECTAL EVERY 12 HOURS PRN
Status: CANCELLED | OUTPATIENT
Start: 2018-05-29

## 2018-05-29 RX ORDER — LIDOCAINE 40 MG/G
CREAM TOPICAL
Status: DISCONTINUED | OUTPATIENT
Start: 2018-05-29 | End: 2018-05-30 | Stop reason: HOSPADM

## 2018-05-29 RX ORDER — DEXTROSE, SODIUM CHLORIDE, SODIUM LACTATE, POTASSIUM CHLORIDE, AND CALCIUM CHLORIDE 5; .6; .31; .03; .02 G/100ML; G/100ML; G/100ML; G/100ML; G/100ML
INJECTION, SOLUTION INTRAVENOUS ONCE
Status: COMPLETED | OUTPATIENT
Start: 2018-05-29 | End: 2018-05-29

## 2018-05-29 RX ORDER — DEXTROSE, SODIUM CHLORIDE, SODIUM LACTATE, POTASSIUM CHLORIDE, AND CALCIUM CHLORIDE 5; .6; .31; .03; .02 G/100ML; G/100ML; G/100ML; G/100ML; G/100ML
INJECTION, SOLUTION INTRAVENOUS CONTINUOUS
Status: DISCONTINUED | OUTPATIENT
Start: 2018-05-29 | End: 2018-05-30 | Stop reason: HOSPADM

## 2018-05-29 RX ORDER — ONDANSETRON 2 MG/ML
4 INJECTION INTRAMUSCULAR; INTRAVENOUS EVERY 6 HOURS PRN
Status: DISCONTINUED | OUTPATIENT
Start: 2018-05-29 | End: 2018-05-30 | Stop reason: HOSPADM

## 2018-05-29 RX ORDER — ONDANSETRON 2 MG/ML
4 INJECTION INTRAMUSCULAR; INTRAVENOUS EVERY 6 HOURS PRN
Status: CANCELLED | OUTPATIENT
Start: 2018-05-29

## 2018-05-29 RX ORDER — DEXTROSE, SODIUM CHLORIDE, SODIUM LACTATE, POTASSIUM CHLORIDE, AND CALCIUM CHLORIDE 5; .6; .31; .03; .02 G/100ML; G/100ML; G/100ML; G/100ML; G/100ML
INJECTION, SOLUTION INTRAVENOUS CONTINUOUS
Status: CANCELLED | OUTPATIENT
Start: 2018-05-29

## 2018-05-29 RX ORDER — HEPARIN SODIUM,PORCINE 10 UNIT/ML
2-5 VIAL (ML) INTRAVENOUS
Status: CANCELLED | OUTPATIENT
Start: 2018-05-29

## 2018-05-29 RX ORDER — PROMETHAZINE HYDROCHLORIDE 25 MG/1
25 TABLET ORAL EVERY 6 HOURS PRN
Status: DISCONTINUED | OUTPATIENT
Start: 2018-05-29 | End: 2018-05-30 | Stop reason: HOSPADM

## 2018-05-29 RX ORDER — PROCHLORPERAZINE 25 MG
25 SUPPOSITORY, RECTAL RECTAL EVERY 12 HOURS PRN
Status: DISCONTINUED | OUTPATIENT
Start: 2018-05-29 | End: 2018-05-30 | Stop reason: HOSPADM

## 2018-05-29 RX ORDER — LIDOCAINE 40 MG/G
CREAM TOPICAL
Status: DISCONTINUED | OUTPATIENT
Start: 2018-05-29 | End: 2018-05-29 | Stop reason: HOSPADM

## 2018-05-29 RX ORDER — LIDOCAINE 40 MG/G
CREAM TOPICAL
Status: CANCELLED | OUTPATIENT
Start: 2018-05-29

## 2018-05-29 RX ORDER — DEXTROSE MONOHYDRATE, SODIUM CHLORIDE, AND POTASSIUM CHLORIDE 50; 1.49; 4.5 G/1000ML; G/1000ML; G/1000ML
INJECTION, SOLUTION INTRAVENOUS CONTINUOUS
Status: DISCONTINUED | OUTPATIENT
Start: 2018-05-29 | End: 2018-05-30 | Stop reason: HOSPADM

## 2018-05-29 RX ORDER — ONDANSETRON 2 MG/ML
4-8 INJECTION INTRAMUSCULAR; INTRAVENOUS EVERY 6 HOURS PRN
Status: CANCELLED
Start: 2018-05-29

## 2018-05-29 RX ORDER — HEPARIN SODIUM,PORCINE 10 UNIT/ML
2-5 VIAL (ML) INTRAVENOUS
Status: DISCONTINUED | OUTPATIENT
Start: 2018-05-29 | End: 2018-05-29 | Stop reason: HOSPADM

## 2018-05-29 RX ORDER — METOCLOPRAMIDE HYDROCHLORIDE 5 MG/ML
10 INJECTION INTRAMUSCULAR; INTRAVENOUS EVERY 6 HOURS PRN
Status: CANCELLED | OUTPATIENT
Start: 2018-05-29

## 2018-05-29 RX ADMIN — POTASSIUM CHLORIDE, DEXTROSE MONOHYDRATE AND SODIUM CHLORIDE: 150; 5; 450 INJECTION, SOLUTION INTRAVENOUS at 22:00

## 2018-05-29 RX ADMIN — PROMETHAZINE HYDROCHLORIDE 25 MG: 25 TABLET ORAL at 22:15

## 2018-05-29 RX ADMIN — SODIUM CHLORIDE, SODIUM LACTATE, POTASSIUM CHLORIDE, CALCIUM CHLORIDE AND DEXTROSE MONOHYDRATE: 5; 600; 310; 30; 20 INJECTION, SOLUTION INTRAVENOUS at 11:53

## 2018-05-29 RX ADMIN — FAMOTIDINE 20 MG: 20 INJECTION, SOLUTION INTRAVENOUS at 12:04

## 2018-05-29 RX ADMIN — METOCLOPRAMIDE 10 MG: 5 INJECTION, SOLUTION INTRAMUSCULAR; INTRAVENOUS at 12:41

## 2018-05-29 RX ADMIN — THIAMINE HYDROCHLORIDE: 100 INJECTION, SOLUTION INTRAMUSCULAR; INTRAVENOUS at 13:12

## 2018-05-29 RX ADMIN — FAMOTIDINE 20 MG: 20 INJECTION, SOLUTION INTRAVENOUS at 20:51

## 2018-05-29 NOTE — IP AVS SNAPSHOT
MRN:[id number]                      After Visit Summary   5/29/2018    Stacie Daniels    MRN: [id number]           Thank you!     Thank you for choosing [facility] for your care. Our goal is always to provide you with excellent care. Hearing back from our patients is one way we can continue to improve our services. Please take a few minutes to complete the written survey that you may receive in the mail after you visit with us. Thank you!        Patient Information     Date Of Birth          1990        About your hospital stay     You were admitted on:  May 29, 2018 You last received care in the:  [facility]    You were discharged on:  May 30, 2018       Who to Call     For medical emergencies, please call 911.  For non-urgent questions about your medical care, please call your primary care provider or clinic, [phone]          Attending Provider     Provider Specialty    Trung Alcantar MD OB/Gyn    Helen Smallwood MD OB/Gyn       Primary Care Provider Office Phone # Fax #    Kimberley STEPHANIE Weeks [facility] [phone]      Additional Services     Home Care Referral       **Order classes of: [facility], [facility] and [facility] will route to the Home Care and Hospice Referral Pool.  Home Care or Hospice will then contact the patient to schedule their appointment.**    If you do not hear from Home Care and Hospice, or you would like to call to schedule, please call the referring place of service that your provider has listed below.  ______________________________________________________________________    Your provider has referred you to: [facility]: [facility] [place] [phone]   [email or web address]    Extended Emergency Contact Information  Primary Emergency Contact: STEPHANIE TEMPLE  Address: [address] [address]  Home Phone:  [phone]  Mobile Phone: [phone]  Relation: Mother  Secondary Emergency Contact: ELIZABETH JENNINGS   [facility]  Home Phone: [phone]  Relation: Significant other    Patient Anticipated Discharge Date: 5/29/18   RN, PT, HHA to begin 24 - 48 hours after discharge.  PLEASE EVALUATE AND TREAT (Evaluation timeline is 24 - 48 hrs. Please call if there is need for a variance to this timeline).    REASON FOR REFERRAL: Home Infusion    ADDITIONAL SERVICES NEEDED: None    OTHER PERTINENT INFORMATION: Patient was last seen by provider on 5/29/18 for hyperemesis.    No current outpatient prescriptions on file.    Patient Active Problem List:     CARDIOVASCULAR SCREENING; LDL GOAL LESS THAN 160     ADHD (attention deficit hyperactivity disorder)     Generalized anxiety disorder     Sciatica     Hyperemesis arising during pregnancy     PTSD (post-traumatic stress disorder)     Panic attacks     Prenatal care, subsequent pregnancy     Hyperemesis      Documentation of Face to Face and Certification for Home Health Services    I certify that patient, Stacie Daniels is under my care and that I, or a Nurse Practitioner or Physician's Assistant working with me, had a face-to-face encounter that meets the physician face-to-face encounter requirements with this patient on: 5/29/2018.    This encounter with the patient was in whole, or in part, for the following medical condition, which is the primary reason for Home Health Care: HOme Infusion.    I certify that, based on my findings, the following services are medically necessary Home Health Services: Nursing    My clinical findings support the need for the above services because: Nurse is needed: To teach and train about the disease and treatments for hyperemesis illness, because pt needs to be taught how to administer fluids.    Further, I certify that my clinical findings support that this patient is homebound (i.e. absences from home require considerable and taxing effort  "and are for medical reasons or [employer] services or infrequently or of short duration when for other reasons) because: Leaving home is medically contraindicated for the following reason(s): weakness.    Based on the above findings, I certify that this patient is confined to the home and needs intermittent skilled nursing care, physical therapy and/or speech therapy.  The patient is under my care, and I have initiated the establishment of the plan of care.  This patient will be followed by a physician who will periodically review the plan of care.    Physician/Provider to provide follow up care: Kimberley De La Cruz    [facility] certified Physician at time of discharge: Dr Helen Smallwood    Please be aware that coverage of these services is subject to the terms and limitations of your health insurance plan.  Call member services at your health plan with any benefit or coverage questions.                  Further instructions from your care team       Discharge diet: Regular   Discharge activity: Activity as tolerated   Discharge follow-up: Follow up with Dr. Smallwood  in 2 weeks   Wound care: Drink plenty of fluids         Pending Results     Date and Time Order Name Status Description    5/29/2018 1113 Treponema Abs w Reflex to RPR and Titer In process     5/29/2018 1113 Rubella Antibody IgG Quantitative In process     5/29/2018 1113 HIV Antigen Antibody Combo In process     5/29/2018 1113 Hepatitis B surface antigen In process             Admission Information     Date & Time Provider Department Dept. Phone    5/29/2018 Helen Smallwood MD [facility] [phone]      Your Vitals Were     Blood Pressure Pulse Temperature Respirations Height Weight    87/45 86 98.1  F (36.7  C) (Oral) 16 1.575 m (5' 2\") 51.7 kg (114 lb)    Last Period Pulse Oximetry BMI (Body Mass Index)             (LMP Unknown) 100% 20.85 kg/m2         MyChart Information     [facility] lets you send messages to your doctor, " "view your test results, renew your prescriptions, schedule appointments and more. To sign up, go to [email or web address] . Click on \"Log in\" on the left side of the screen, which will take you to the Welcome page. Then click on \"Sign up Now\" on the right side of the page.     You will be asked to enter the access code listed below, as well as some personal information. Please follow the directions to create your username and password.     Your access code is: [email or web address]  Expires: 2018  9:43 AM     Your access code will  in 90 days. If you need help or a new code, please call your [facility] clinic or [phone].        Care EveryWhere ID     This is your Care EveryWhere ID. This could be used by other organizations to access your [facility] medical records  [id number]        Equal Access to Services     [facility]: Eitan Interiano, gonzales kaur, rosalino hay, margaux [place] . So [facility] [phone].    ATENCIÓN: Si habla español, tiene a donovan disposición servicios gratuitos de asistencia lingüística. Simone al [phone].    We comply with applicable federal civil rights laws and [place] laws. We do not discriminate on the basis of race, color, national origin, age, disability, sex, sexual orientation, or gender identity.               Review of your medicines      START taking        Dose / Directions    prochlorperazine 25 MG Suppository   Commonly known as:  COMPAZINE   Used for:  Hyperemesis arising during pregnancy        Dose:  25 mg   Place 1 suppository (25 mg) rectally every 12 hours as needed for nausea   Quantity:  20 suppository   Refills:  1         CONTINUE these medicines which have NOT CHANGED        Dose / Directions    metoclopramide 5 MG tablet   Commonly known as:  REGLAN   Used for:  Hyperemesis arising during pregnancy        Dose:  5-10 mg   Take 1-2 tablets (5-10 mg) by mouth every 6 hours as needed   Quantity:  240 " tablet   Refills:  1       ondansetron 8 MG ODT tab   Commonly known as:  ZOFRAN ODT   Used for:  Nausea/vomiting in pregnancy        Dose:  8 mg   Take 1 tablet (8 mg) by mouth 3 times daily (before meals)   Quantity:  60 tablet   Refills:  1       promethazine 25 MG tablet   Commonly known as:  PHENERGAN        Dose:  25 mg   Take 1 tablet (25 mg) by mouth every 6 hours as needed for nausea   Quantity:  20 tablet   Refills:  0            Where to get your medicines      These medications were sent to [facility] [place], [place] - [address]  [address] [address]     Phone:  [phone]     prochlorperazine 25 MG Suppository                Protect others around you: Learn how to safely use, store and throw away your medicines at www.disposemymeds.org.             Medication List: This is a list of all your medications and when to take them. Check marks below indicate your daily home schedule. Keep this list as a reference.      Medications           Morning Afternoon Evening Bedtime As Needed    metoclopramide 5 MG tablet   Commonly known as:  REGLAN   Take 1-2 tablets (5-10 mg) by mouth every 6 hours as needed                                ondansetron 8 MG ODT tab   Commonly known as:  ZOFRAN ODT   Take 1 tablet (8 mg) by mouth 3 times daily (before meals)                                prochlorperazine 25 MG Suppository   Commonly known as:  COMPAZINE   Place 1 suppository (25 mg) rectally every 12 hours as needed for nausea                                promethazine 25 MG tablet   Commonly known as:  PHENERGAN   Take 1 tablet (25 mg) by mouth every 6 hours as needed for nausea   Last time this was given:  25 mg on 5/29/2018 10:15 PM                                          More Information        Severe Morning Sickness (Hyperemesis Gravidarum)    Nausea and vomiting are common during pregnancy. It is often called  morning sickness,  but it can happen at any time of day.  But severe nausea and vomiting that doesn t let up is not normal. Dehydration and weight loss can result. This can be dangerous for the mother and baby. Hyperemesis gravidarum (HEG) is the medical term for severe nausea and vomiting during pregnancy, and it results in weight loss. If you have it, your healthcare provider can take steps to keep you and your baby safe. He or she can also help you find relief.   What are the symptoms of severe morning sickness?  Call your healthcare provider right away if you suspect that you have severe morning sickness. The symptoms include:    Inability to keep down liquids    Nausea that is severe and lasts beyond the first few months    Inability to empty the bladder     Urine that is dark and concentrated     Fainting spells  What causes severe morning sickness?  Nausea and vomiting during pregnancy are thought to be caused by an increase in certain hormone levels. It is not clear what causes severe morning sickness, but it may be more likely in women carrying twins or more. Your healthcare provider will do some tests to rule out certain health conditions that may lead to severe nausea and vomiting.  Getting relief from morning sickness  To help combat nausea, eat small amounts often. This helps prevent the stomach from being empty, which can make nausea worse. Choose dry foods such as crackers. Try sipping cold, clear drinks. And ask your healthcare provider about taking vitamin B-6 or margarte to help ease nausea. Your provider may recommend that you try vitamin B-6 and a medicine called doxylamine to relieve the nausea. In some cases, alternative treatments such as acupuncture are effective in helping managing nausea during pregnancy.  Treating severe morning sickness  The focus of treatment for severe morning sickness is to relieve symptoms and prevent weight loss and dehydration. If you are dehydrated or losing weight, steps are needed to protect you and your baby. You will  most likely be admitted to the hospital for at least a short time. There, you can be given IV fluids to rehydrate you. You may also be prescribed medicines that relieve nausea. In very severe cases, a longer hospitalization may be needed. IV nutrition or tube feeding will then be used. If this becomes necessary, your healthcare provider can tell you more.  Recovery and follow-up  With treatment, severe morning sickness can be managed. Follow up with your healthcare provider to be sure you are keeping down fluids and gaining a healthy amount of weight.  When to seek medical care  Contact your healthcare provider right away if you have any of the following:    Signs of dehydration, including extreme thirst, headache, little urine, very dark urine, or a dry, sticky mouth.    Weight loss    Dizziness or fainting    Racing or pounding heart    Blood in your vomit   Date Last Reviewed: 5/1/2016 2000-2017 [employer]. [address] [place] [address]. All rights reserved. This information is not intended as a substitute for professional medical care. Always follow your healthcare professional's instructions.              Eating Tips for Morning Sickness   Hyperemesis Gravidarum  During pregnancy, you need to eat enough food to meet your needs and the needs of your baby. Severe nausea and vomiting (hyperemesis) may lead to weight loss and dehydration (too little fluid in the body).   Follow these tips to help control nausea.   1. Eat 6 to 8 small meals in a day, about two hours apart.  2. Before rising in the morning, eat a small amount of dry food. Choose from the list below.  ? soda crackers (saltines)  ? dry toast with jelly  ? breadsticks  ? dry cereal  ? rice cakes  ? pretzels  ? plain potatoes, rice or noodles  ? plain low-fat cookies or cake  3. Avoid liquids with meals. Drink liquids 30 to 60 minutes before or after eating. Sip slowly.  4. Foods and drinks should be cool or at room  temperature. Try:  ? flavored gelatin  ? sherbet, sorbet or Popsicles  ? carbonated (fizzy) drinks  ? ice cubes made from juice.  5. Avoid hot drinks and foods.  6. Avoid drinks with caffeine (coffee, tea, cola drinks). They may increase stomach acid.  7. Avoid very sweet, hot or spicy foods.  8. Avoid high-fat foods such as butter, margarine, mayonnaise, espinoza, gravies, pie crust, pastries and fried foods. They take longer to leave the stomach.  9. Avoid strong food odors such as fish, cabbage or broccoli. Avoid cooking odors by eating food you do not have to cook.  10. Do not lie down after eating. Rest sitting up for an hour after meals.  11. Take your prenatal vitamins with food in the evening. Tell your doctor if you cannot take them.  12. Nausea is often gone by midday. You may eat more food in the late afternoon, supper and mid-evening. Find the times best for you.  Keep a food diary to help you find foods that you can eat without problems. Try any food that appeals to you.  Menu Planning Guidelines     [employer]. Reprinted with permission.  Food groups Foods recommended  Foods that may cause distress    Soups Low-fat broth-based and cream soups made with allowed foods. All other soups.    Meats and substitutes  (Six or more ounces daily) All lean, tender meats, poultry or fish. All should be baked, broiled or boiled. Boiled egg. Low-fat or fat-free cheeses. Fried meat, poultry or fish; highly seasoned, cured or smoked meat, poultry or fish (i.e., corned beef, luncheon meat, frankfurters, sausages, sardines, anchovies, espinoza and strong flavored cheese). Peanut butter.    Fruits (Two or more servings daily; include a vitamin C source daily) Fruit juices, canned fruits, grapefruit and orange sections (without membrane). Other fresh and dried fruits, if tolerated.     Vegetables  (Three or more servings daily) Vegetable juices, cooked vegetables (i.e., asparagus, green or wax beans, beets, carrots, peas, pumpkin,  winter squash, spinach and mushrooms). Raw vegetables if tolerated.  Gas-forming vegetables (i.e., dried peas and beans, corn, broccoli, onions, cauliflower, [place] sprouts, cucumbers, cabbage, turnip, rutabagas, sauerkraut, green peppers).    Bread, cereal, potato, pasta and grains  (Six or more servings daily) Enriched breads and cereals, plain crackers, potatoes, enriched rice, barley, noodles, spaghetti, macaroni and other pastas. Very coarse cereals such as bran; seeds in or on breads, rolls and crackers; breads made with nuts or dried fruits; fried breads and pastries such as doughnuts; fried potatoes, fried rice, wild rice, seasoned rice and pasta mixes.    Dessert fats Low-fat versions of cakes, cookies, custard, pudding, ice cream, frozen yogurt sherbet; ice pops, gelatin, frozen fruit bars, sorbet. Desserts containing salad dressings, nuts, coconut; high-fat desserts.    Milk and milk products (Four or more cups daily) Fat-free and low-fat milk products. Whole milk, cream.    Beverages   (Four or more cups daily) Water, decaffeinated coffee and tea, fruit drinks, caffeine-free carbonated beverages, weak tea, lemonade, sports drinks. All caffeine-containing beverages (i.e., coffee, strong tea, cocoa, cola); alcoholic beverages.    Condiments and sweets Iodized salt, flavorings, low-fat gravies and sauces, herbs and spices as tolerated; sugar, syrup, honey, jelly, seedless jam, hard candies and marshmallows. Strongly flavored seasonings and condiments (i.e., catsup, pepper, barbecue sauce, chili sauce, chili pepper, horseradish, garlic, mustard and vinegar), olives, pickles, nuts, chocolate candy.    For informational purposes only. Not to replace the advice of your health care provider.   Copyright   2006 [facility]. All rights reserved. [employer] 511723 - REV 09/15.

## 2018-05-29 NOTE — CONSULTS
SW note. This writer received phone call from clinic OB RN regarding pts needs for home infusion for fluids at home. Referral was placed with [facility] (Phone: [phone] Fax: [phone]). This writer did confirm that pt has 100% coverage at home. Infusion order placed. Pt will dc home when stable, PICC line placed. Elsa COY, [facility], [facility] [phone]

## 2018-05-29 NOTE — IP AVS SNAPSHOT
"    [facility]: [phone]                                              INTERAGENCY TRANSFER FORM - PHYSICIAN ORDERS   2018                    Hospital Admission Date: 2018  SANTANA PALAFOX   : 1990  Sex: Female        Attending Provider: Helen Smallwood MD     Allergies:  Augmentin, Avocado    Infection:  None   Service:  OBSTETRICS    Ht:  1.575 m (5' 2\")   Wt:  51.7 kg (114 lb)   Admission Wt:  51.7 kg (114 lb)    BMI:  20.85 kg/m 2   BSA:  1.5 m 2            Patient PCP Information     Provider PCP Type    [facility]    STEPHANIE Suarez CNP General      ED Clinical Impression     Diagnosis Description Comment Added By Time Added    Intractable cyclical vomiting with nausea [G43.A1] Intractable cyclical vomiting with nausea [G43.A1]  Micky Garza RN 2018  1:35 PM    Hyperemesis arising during pregnancy [O21.0] Hyperemesis arising during pregnancy [O21.0]  Michael Steen MD 2018 10:01 AM      Hospital Problems as of 2018              Priority Class Noted POA    Hyperemesis Medium  2018 Yes      Non-Hospital Problems as of 2018              Priority Class Noted    ADHD (attention deficit hyperactivity disorder) Medium  2005    CARDIOVASCULAR SCREENING; LDL GOAL LESS THAN 160 Medium  2012    Generalized anxiety disorder Medium  3/28/2013    Sciatica Medium  2013    Hyperemesis arising during pregnancy Medium  2013    PTSD (post-traumatic stress disorder) Medium  10/7/2013    Panic attacks Medium  2014    Prenatal care, subsequent pregnancy Medium  2018      Code Status History     This patient does not have a recorded code status. Please follow your organizational policy for patients in this situation.         Medication Review      START taking        Dose / Directions Comments    prochlorperazine 25 MG Suppository   Commonly known as:  COMPAZINE   Used for:  " Hyperemesis arising during pregnancy        Dose:  25 mg   Place 1 suppository (25 mg) rectally every 12 hours as needed for nausea   Quantity:  20 suppository   Refills:  1          CONTINUE these medications which have NOT CHANGED        Dose / Directions Comments    metoclopramide 5 MG tablet   Commonly known as:  REGLAN   Used for:  Hyperemesis arising during pregnancy        Dose:  5-10 mg   Take 1-2 tablets (5-10 mg) by mouth every 6 hours as needed   Quantity:  240 tablet   Refills:  1        ondansetron 8 MG ODT tab   Commonly known as:  ZOFRAN ODT   Used for:  Nausea/vomiting in pregnancy        Dose:  8 mg   Take 1 tablet (8 mg) by mouth 3 times daily (before meals)   Quantity:  60 tablet   Refills:  1        promethazine 25 MG tablet   Commonly known as:  PHENERGAN        Dose:  25 mg   Take 1 tablet (25 mg) by mouth every 6 hours as needed for nausea   Quantity:  20 tablet   Refills:  0                  Further instructions from your care team       Discharge diet: Regular   Discharge activity: Activity as tolerated   Discharge follow-up: Follow up with Dr. Smallwood  in 2 weeks   Wound care: Drink plenty of fluids         Referrals     Home Care Referral       **Order classes of: [facility], [facility] and [facility] will route to the Home Care and Hospice Referral Pool.  Home Care or Hospice will then contact the patient to schedule their appointment.**    If you do not hear from Home Care and Hospice, or you would like to call to schedule, please call the referring place of service that your provider has listed below.  ______________________________________________________________________    Your provider has referred you to: [facility]: [facility] [phone]   [email or web address]    Extended Emergency Contact Information  Primary Emergency Contact: STEPHANIE TEMPLE  Address: [address]  [place] [address] [facility]  Home Phone: [phone]  Mobile Phone: [phone]  Relation: Mother  Secondary Emergency Contact: ELIZABETH JENNINGS   [facility]  Home Phone: [phone]  Relation: Significant other    Patient Anticipated Discharge Date: 5/30/2018   PLEASE EVALUATE AND TREAT (Evaluation timeline is 24 - 48 hrs. Please call if there is need for a variance to this timeline).    REASON FOR REFERRAL: IV Treatment or Therapy (call [phone] when placing order): Access Type: PICC    OTHER PERTINENT INFORMATION: Patient was last seen by provider on 5/30/2018 for intractable vomiting.    Current Outpatient Prescriptions:  prochlorperazine (COMPAZINE) 25 MG Suppository, Place 1 suppository (25 mg) rectally every 12 hours as needed for nausea, Disp: 20 suppository, Rfl: 1      Patient Active Problem List:     CARDIOVASCULAR SCREENING; LDL GOAL LESS THAN 160     ADHD (attention deficit hyperactivity disorder)     Generalized anxiety disorder     Sciatica     Hyperemesis arising during pregnancy     PTSD (post-traumatic stress disorder)     Panic attacks     Prenatal care, subsequent pregnancy     Hyperemesis      Documentation of Face to Face and Certification for Home Health Services    I certify that patient, Stacie Daniels is under my care and that I, or a Nurse Practitioner or Physician's Assistant working with me, had a face-to-face encounter that meets the physician face-to-face encounter requirements with this patient on: 5/30/2018.    This encounter with the patient was in whole, or in part, for the following medical condition, which is the primary reason for Home Health Care: intractable vomiting.    I certify that, based on my findings, the following services are medically necessary Home Health Services: Nursing    My clinical findings support the need for the above services because: Nurse is needed: To provide assessment and oversight required in the home to assure adherence to the medical  plan due to: intractable vomiting..    Further, I certify that my clinical findings support that this patient is homebound (i.e. absences from home require considerable and taxing effort and are for medical reasons or Christianity services or infrequently or of short duration when for other reasons) because: Requires assistance of another person or specialized equipment to access medical services because patient: Requires supervision of another for safe transfer..    Based on the above findings, I certify that this patient is confined to the home and needs intermittent skilled nursing care, physical therapy and/or speech therapy.  The patient is under my care, and I have initiated the establishment of the plan of care.  This patient will be followed by a physician who will periodically review the plan of care.    Physician/Provider to provide follow up care: Kimberley De La Cruz    [facility] certified Physician at time of discharge: Helen Smallwood    Please be aware that coverage of these services is subject to the terms and limitations of your health insurance plan.  Call member services at your health plan with any benefit or coverage questions.

## 2018-05-29 NOTE — MR AVS SNAPSHOT
"              After Visit Summary   5/29/2018    Stacie Daniels    MRN: [id number]           Patient Information     Date Of Birth          1990        Visit Information        Provider Department      5/29/2018 9:30 AM Helen Smallwood MD; [facility]        Today's Diagnoses     Prenatal care of multigravida, antepartum    -  1    Hyperemesis arising during pregnancy           Follow-ups after your visit        Future tests that were ordered for you today     Open Standing Orders        Priority Remaining Interval Expires Ordered    XR Chest 1 View STAT 2/2 CONDITIONAL X 2  5/29/2018    XR Chest Port 1 View STAT 2/2 CONDITIONAL X 2  5/29/2018    IR PICC Vascular STAT 1/1 CONDITIONAL X 1 STAT  5/29/2018            Who to contact     If you have questions or need follow up information about today's clinic visit or your schedule please contact [facility] directly at [phone].  Normal or non-critical lab and imaging results will be communicated to you by MyChart, letter or phone within 4 business days after the clinic has received the results. If you do not hear from us within 7 days, please contact the clinic through [facility] or phone. If you have a critical or abnormal lab result, we will notify you by phone as soon as possible.  Submit refill requests through [employer] or call your pharmacy and they will forward the refill request to us. Please allow 3 business days for your refill to be completed.          Additional Information About Your Visit        [facility] Information     [employer] lets you send messages to your doctor, view your test results, renew your prescriptions, schedule appointments and more. To sign up, go to [email or web address] . Click on \"Log in\" on the left side of the screen, which will take you to the Welcome page. Then click on \"Sign up Now\" on the right side of the page.     You will be asked to enter the access code listed below, as well " "as some personal information. Please follow the directions to create your username and password.     Your access code is: [email or web address]  Expires: 2018  9:43 AM     Your access code will  in 90 days. If you need help or a new code, please call your [facility] clinic or [phone].        Care EveryWhere ID     This is your Care EveryWhere ID. This could be used by other organizations to access your [facility] medical records  [id number]        Your Vitals Were     Pulse Temperature Respirations Height Last Period BMI (Body Mass Index)    86 98.9  F (37.2  C) (Tympanic) 16 5' 1.5\" (1.562 m) (LMP Unknown) 21.34 kg/m2       Blood Pressure from Last 3 Encounters:   18 111/72   18 120/81   18 115/75    Weight from Last 3 Encounters:   18 114 lb (51.7 kg)   18 114 lb 12.8 oz (52.1 kg)   18 116 lb (52.6 kg)              We Performed the Following     US OB <14 Weeks w Transvaginal Single        Primary Care Provider Office Phone # Fax #    Kimberley STEPHANIE Weeks [facility] [phone] [address] [address] [facility] [address]        Equal Access to Services     MAYNOR GOLDEN : Hadii aad ku hadasho Soomaali, waaxda luqadaha, qaybta kaalmada adeegyada, margaux [place] . So [facility] [phone].    ATENCIÓN: Si habla español, tiene a donovan disposición servicios gratuitos de asistencia lingüística. Llame al [phone].    We comply with applicable federal civil rights laws and [place] laws. We do not discriminate on the basis of race, color, national origin, age, disability, sex, sexual orientation, or gender identity.            Thank you!     Thank you for choosing [facility]  for your care. Our goal is always to provide you with excellent care. Hearing back from our patients is one way we can continue to improve our services. Please take a few minutes to complete the written survey that you may receive in the mail after your visit " with us. Thank you!             Your Updated Medication List - Protect others around you: Learn how to safely use, store and throw away your medicines at www.disposemymeds.org.          This list is accurate as of 5/29/18 10:12 AM.  Always use your most recent med list.                   Brand Name Dispense Instructions for use Diagnosis    metoclopramide 5 MG tablet    REGLAN    240 tablet    Take 1-2 tablets (5-10 mg) by mouth every 6 hours as needed    Hyperemesis arising during pregnancy       ondansetron 8 MG ODT tab    ZOFRAN ODT    60 tablet    Take 1 tablet (8 mg) by mouth 3 times daily (before meals)    Nausea/vomiting in pregnancy       promethazine 25 MG tablet    PHENERGAN    20 tablet    Take 1 tablet (25 mg) by mouth every 6 hours as needed for nausea

## 2018-05-29 NOTE — H&P
[facility] Labor and Delivery History and Physical    Stacie Daniels MRN# [id number]   Age: 28 year old YOB: 1990     Date of Admission:  2018    Primary care provider: Kimberley De La Cruz           Chief Complaint:   Stacie Daniels is a 28 year old female who is 9w4d pregnant and being admitted for hyperemesis.  Has not kept down much over the last 3 days.  Prior pregnancy affected by HEG and patient needed a PICC during that pregnancy as well.          Pregnancy history:     OBSTETRIC HISTORY:    Obstetric History       T1      L1     SAB2   TAB0   Ectopic0   Multiple0   Live Births1       # Outcome Date GA Lbr Gonsalo/2nd Weight Sex Delivery Anes PTL Lv   4 Current            3 Term 13 38w6d 06:55 / 01:54 7 lb 11 oz (3.487 kg) F Vag-Spont EPI N YOLANDA      Name: Cindy      Apgar1:  8                Apgar5: 9   2 SAB 07/10/12 5w0d    SAB      1 SAB 10/10/07 5w0d    SAB             EDC: Estimated Date of Delivery: 18    Prenatal Labs:   Lab Results   Component Value Date    ABO B 2012    RH  Pos 2012    AS Neg 2012    HEPBANG Negative 2012    CHPCRT  2012     Negative for C. trachomatis rRNA by transcription mediated amplification.   A negative result by transcription mediated amplification does not preclude the   presence of C. trachomatis infection because results are dependent on proper   and adequate collection, absence of inhibitors, and sufficient rRNA to be   detected.    GCPCRT  2012     Negative for N. gonorrhoeae rRNA by transcription mediated amplification.   A negative result by transcription mediated amplification does not preclude the   presence of N. gonorrhoeae infection because results are dependent on proper   and adequate collection, absence of inhibitors, and sufficient rRNA to be   detected.    TREPAB Negative 2012    RUBELLAABIGG 14 2012    HGB 12.8 2018    HIV Negative 2012        GBS Status:   Lab Results   Component Value Date    GBS  05/15/2013     Negative: No GBS DNA detected, presumed negative for GBS or number of bacteria   may be below the limit of detection of the assay.   Assay performed on incubated broth culture of specimen using [employer] real-time   PCR.       Active Problem List  Patient Active Problem List   Diagnosis     CARDIOVASCULAR SCREENING; LDL GOAL LESS THAN 160     ADHD (attention deficit hyperactivity disorder)     Generalized anxiety disorder     Sciatica     Hyperemesis arising during pregnancy     PTSD (post-traumatic stress disorder)     Panic attacks     Prenatal care, subsequent pregnancy     Hyperemesis       Medication Prior to Admission  Prescriptions Prior to Admission   Medication Sig Dispense Refill Last Dose     metoclopramide (REGLAN) 5 MG tablet Take 1-2 tablets (5-10 mg) by mouth every 6 hours as needed 240 tablet 1 Taking     ondansetron (ZOFRAN ODT) 8 MG ODT tab Take 1 tablet (8 mg) by mouth 3 times daily (before meals) 60 tablet 1 Taking     promethazine (PHENERGAN) 25 MG tablet Take 1 tablet (25 mg) by mouth every 6 hours as needed for nausea 20 tablet 0 Taking   .        Maternal Past Medical History:     Past Medical History:   Diagnosis Date     Anxiety      Chickenpox      Febrile convulsion (H)     toddler     Major depressive disorder, recurrent episode, mild (H)     off zoloft     Ulcer (H)     age 20                       Family History:     Family History   Problem Relation Age of Onset     C.A.D. Father      CANCER Father      lymphoma     Prostate Cancer Father      CANCER Maternal Grandmother      rectal     Depression Maternal Grandmother      CEREBROVASCULAR DISEASE Maternal Grandmother      Prostate Cancer Maternal Grandfather      Breast Cancer Paternal Grandmother      Hypertension Paternal Grandmother      Depression Paternal Grandmother      Thyroid Disease Paternal Grandmother      Hypertension Paternal Grandfather       Depression Mother      Bipolar Disorder Mother      Depression Sister      Bipolar Disorder Sister      Depression Sister      Schizophrenia Maternal Uncle      Suicide Paternal Aunt      DIABETES No family hx of      Glaucoma No family hx of      Macular Degeneration No family hx of      Family history reviewed and updated in [facility]            Social History:     Social History   Substance Use Topics     Smoking status: Former Smoker     Smokeless tobacco: Never Used     Alcohol use No      Comment: rare-quit with pregnancy            Review of Systems:   The Review of Systems is negative other than noted in the HPI          Physical Exam:   Vitals were reviewed  Temp: 98.3  F (36.8  C) Temp src: Oral BP: 111/72 Pulse: 89   Resp: 16        Constitutional:   awake, alert, cooperative, no apparent distress, and appears stated age     Lungs:   No increased work of breathing, good air exchange, clear to auscultation bilaterally, no crackles or wheezing     Cardiovascular:   normal S1 and S2      Pelvic deferred  FHT confirmed in office by transvaginal ultrasound                       Assessment:   Stacie Daniels is a 9w4d pregnant female admitted with hyperemesis.          Plan:   Observation  Plan PICC placement  Prenatal labs and electrolytes  IV hydration and IV antiemetics    Helen Smallwood MD

## 2018-05-29 NOTE — PROGRESS NOTES
[facility]  Procedure Note          Peripherally Inserted Central Line Catheter (PICC):       Stacie Daniels  MRN# [id number]   May 29, 2018, 1:41 PM Indication: Hyperemesis           Pause for the cause: Consent for catheter placement procedure signed  Time out completed  Patient ID's verified using two distinct indicators  All necessary equipment is present   Type of line to be used: PICC   Full barrier precautions used: Yes   Skin preparation: Chloraprep   Date of insertion: May 29, 2018, 1:10 PM   Device type: Single lumen, valved, 4.0   Catheter brand: Bard Solo Power   Lot number: [id number]   Insertion location: Right basilic vein   Method of placement: Venipuncture  MST  Ultrasound   Number of attempts: With ultrasound: 1   Without ultrasound: 0   Difficulty threading: No   Midline IV device: Dressing dry and intact   Arm circumference: 28 cm measured 10 cm above Right AC   Midline extremity circumference: na cm   Internal length: 39 cm   Midline visible catheter length: 1 cm   Total catheter length: 40 cm to hub   Tip termination: SVC   Method of verification: Chest x-ray   Midline patency post placement: Positive blood return  Flushes without difficulty  Saline locked   Line flush: Line flush documented on the eMAR yes   Placement verified by: Radiologist   Catheter placed by: Lenan Sim RN   Discontinuation form initiated: No   Patient tolerance: Tolerated well   PICC Insertion Education Complete: Yes      Summary:  This procedure was performed without difficulty and she tolerated the procedure well with no noted immediate complications.   Line confirmed with Radiologist - OK to use for meds, labs and power injection.      Recorded by Leann Sim RN    Attestation:  Amount of time performed on this procedure: 15 minutes.

## 2018-05-29 NOTE — PROGRESS NOTES
Vascular access RN Leann Sim is here to place PICC line. Informed consent obtained by her. Pt verbalizes understanding of the procedure.

## 2018-05-29 NOTE — IP AVS SNAPSHOT
[facility]    [address] [address]    Phone:  [phone]    Fax:  [phone]                                       After Visit Summary   5/29/2018    Stacie Daniels    MRN: [id number]           After Visit Summary Signature Page     I have received my discharge instructions, and my questions have been answered. I have discussed any challenges I see with this plan with the nurse or doctor.    ..........................................................................................................................................  Patient/Patient Representative Signature      ..........................................................................................................................................  Patient Representative Print Name and Relationship to Patient    ..................................................               ................................................  Date                                            Time    ..........................................................................................................................................  Reviewed by Signature/Title    ...................................................              ..............................................  Date                                                            Time

## 2018-05-29 NOTE — PROGRESS NOTES
presents to [facility] for PICC placement, IV fluids and antiemetics. Pt reports having nausea and vomiting for 3 days. Pt admitted for the above and observation over night. Peripheral IV bolus is infusing into left hand without issues, IV Pepcid administered as ordered. Pt has had 2 emesis since arrival. Will administer IV Reglan when IV Pecid is finished infusing. Pt is not taking ice chips yet, as feeling nauseated.

## 2018-05-29 NOTE — PLAN OF CARE
Problem: Patient Care Overview  Goal: Plan of Care/Patient Progress Review  Outcome: Improving  Pt is with VSS, PICC infusing as ordered without problems and pt has not had an emesis since admission. Pt is not nauseated now and is trying ice chips and clear liquids.

## 2018-05-29 NOTE — PROGRESS NOTES
Pt is tolerating clear liquids and eating dinner. VSS, Pt reports that she takes Phenergan at home for nausea and it helps her sleep. Pt requesting for hs. Dr. Alcantar updated on the above. Vital signs may be done per unit routine now and order for Phenergan received. Will continue to monitor.

## 2018-05-29 NOTE — PROGRESS NOTES
Satcie is a 28 year old  @ 9.4 weeks here for new ob visit.  Having difficulty with hyperemesis.  Had this in her last pregnancy and needed a PICC.      Has been seen in ED and given fluids.  Currently, has been unable to tolerate oral intake for last 3 days despite zofran and reglan    ROS: Ten point review of systems was reviewed and negative except the above.  Current Issues include: nausea, severe  vomiting, many times daily, unable to keep down solid foods and unable to keep down anything    OBhx:  x 2  SAB x 1  Gyne: Pap smears Normal  history of STD No STD history  Past Medical History:   Diagnosis Date     Anxiety      Chickenpox      Febrile convulsion (H)     toddler     Major depressive disorder, recurrent episode, mild (H)     off zoloft     Ulcer (H)     age 20     Past Surgical History:   Procedure Laterality Date     FOREIGN BODY REMOVAL      metal from her eye as a child     Patient Active Problem List    Diagnosis Date Noted     Prenatal care, subsequent pregnancy 2018     Priority: Medium     FOB- Crystal Kravik       Panic attacks 2014     Priority: Medium     PTSD (post-traumatic stress disorder) 10/07/2013     Priority: Medium     Hyperemesis arising during pregnancy 2013     Priority: Medium     Has PICC line  Daily zofran and fluids       Sciatica 2013     Priority: Medium     Generalized anxiety disorder 2013     Priority: Medium     Diagnosis updated by automated process. Provider to review and confirm.       CARDIOVASCULAR SCREENING; LDL GOAL LESS THAN 160 2012     Priority: Medium     ADHD (attention deficit hyperactivity disorder) 2005     Priority: Medium     Off Concerta          Allergies   Allergen Reactions     Augmentin Hives     Avocado Hives and Swelling       Current Facility-Administered Medications on File Prior to Visit:  0.9 % sodium chloride IV solution   propofol (DIPRIVAN) injection 10 mg/mL vial     Current Outpatient  "Prescriptions on File Prior to Visit:  metoclopramide (REGLAN) 5 MG tablet Take 1-2 tablets (5-10 mg) by mouth every 6 hours as needed   ondansetron (ZOFRAN ODT) 8 MG ODT tab Take 1 tablet (8 mg) by mouth 3 times daily (before meals)   promethazine (PHENERGAN) 25 MG tablet Take 1 tablet (25 mg) by mouth every 6 hours as needed for nausea       Past Medical History of Father of Baby:   No significant medical history    Physical Exam: /81 (BP Location: Right arm, Patient Position: Chair, Cuff Size: Adult Regular)  Pulse 86  Temp 98.9  F (37.2  C) (Tympanic)  Resp 16  Ht 5' 1.5\" (1.562 m)  Wt 114 lb 12.8 oz (52.1 kg)  LMP  (LMP Unknown)  BMI 21.34 kg/m2  General: Well developed, well nourished female  Skin: Normal  HEENT: Normal  Neck: Supple,no adenopathy,thyroid normal  Chest: Clear  Heart: Regular rate, rhythm,No murmur, rub, gallop  Breasts: Not examined   Abdomen: Benign,Soft, flat, non-tender,No masses, organomegaly,No inguinal nodes,Bowel sounds normoactive   Extremities: Normal  Neurological: Normal   Perineum: Normal   Vulva: Normal    Transvaginal ultrasound was performed.  A viable intrauterine pregnancy was seen.  CRL consistent with 10 weeks, 0 days.  Fetal heart motion was visualized.     EDC by sono:  18 by 6 week U/S  Final EDC: 18    A/P 28 year old  at  9.4 weeks    1. Discussed physician coverage, tertiary support, diet, exercise, weight gain, schedule of visits, routine and indicated ultrasounds, and childbirth education.    2. Options for  testing for chromosomal and birth defects were discussed with the patient including nuchal lucency/blood marker testing in the first trimester and quad screening and/or Level 2 ultrasound in the second trimester.  We discussed that these are screening tests and not diagnostic tests and that false positives and negatives are a distinct possibility.  We discussed that follow up diagnostic testing would include chorionic " villus sampling or amniocentesis depending on gestational age.    3. Prenatal labs    4. Prenatal Vitamins    5. HEG: plan observation admission for fluids and PICC placement.  See admission notes.     Helen Smallwood M.D.

## 2018-05-30 ENCOUNTER — HOME INFUSION (PRE-WILLOW HOME INFUSION) (OUTPATIENT)
Dept: PHARMACY | Facility: CLINIC | Age: 28
End: 2018-05-30

## 2018-05-30 VITALS
HEART RATE: 86 BPM | DIASTOLIC BLOOD PRESSURE: 45 MMHG | BODY MASS INDEX: 20.98 KG/M2 | TEMPERATURE: 98.1 F | SYSTOLIC BLOOD PRESSURE: 87 MMHG | WEIGHT: 114 LBS | RESPIRATION RATE: 16 BRPM | OXYGEN SATURATION: 100 % | HEIGHT: 62 IN

## 2018-05-30 LAB
HBV SURFACE AG SERPL QL IA: NONREACTIVE
HIV 1+2 AB+HIV1 P24 AG SERPL QL IA: NONREACTIVE
RUBV IGG SERPL IA-ACNC: 7 IU/ML
T PALLIDUM AB SER QL: NONREACTIVE

## 2018-05-30 PROCEDURE — 25800025 ZZH RX 258: Performed by: OBSTETRICS & GYNECOLOGY

## 2018-05-30 PROCEDURE — 96360 HYDRATION IV INFUSION INIT: CPT

## 2018-05-30 PROCEDURE — 25000128 H RX IP 250 OP 636: Performed by: OBSTETRICS & GYNECOLOGY

## 2018-05-30 PROCEDURE — 25000125 ZZHC RX 250: Performed by: OBSTETRICS & GYNECOLOGY

## 2018-05-30 PROCEDURE — 96376 TX/PRO/DX INJ SAME DRUG ADON: CPT

## 2018-05-30 PROCEDURE — 96374 THER/PROPH/DIAG INJ IV PUSH: CPT

## 2018-05-30 PROCEDURE — G0378 HOSPITAL OBSERVATION PER HR: HCPCS

## 2018-05-30 PROCEDURE — 96366 THER/PROPH/DIAG IV INF ADDON: CPT

## 2018-05-30 PROCEDURE — 96372 THER/PROPH/DIAG INJ SC/IM: CPT

## 2018-05-30 PROCEDURE — 96365 THER/PROPH/DIAG IV INF INIT: CPT

## 2018-05-30 PROCEDURE — 96375 TX/PRO/DX INJ NEW DRUG ADDON: CPT

## 2018-05-30 PROCEDURE — 96368 THER/DIAG CONCURRENT INF: CPT

## 2018-05-30 PROCEDURE — 96361 HYDRATE IV INFUSION ADD-ON: CPT

## 2018-05-30 RX ORDER — PROMETHAZINE HYDROCHLORIDE 25 MG/1
25 TABLET ORAL EVERY 6 HOURS PRN
Qty: 20 TABLET | Refills: 1 | Status: SHIPPED | OUTPATIENT
Start: 2018-05-30 | End: 2018-07-16

## 2018-05-30 RX ORDER — PROCHLORPERAZINE 25 MG
25 SUPPOSITORY, RECTAL RECTAL EVERY 12 HOURS PRN
Qty: 20 SUPPOSITORY | Refills: 1 | Status: SHIPPED | OUTPATIENT
Start: 2018-05-30 | End: 2018-07-06

## 2018-05-30 RX ADMIN — POTASSIUM CHLORIDE, DEXTROSE MONOHYDRATE AND SODIUM CHLORIDE: 150; 5; 450 INJECTION, SOLUTION INTRAVENOUS at 05:24

## 2018-05-30 RX ADMIN — THIAMINE HYDROCHLORIDE: 100 INJECTION, SOLUTION INTRAMUSCULAR; INTRAVENOUS at 09:26

## 2018-05-30 RX ADMIN — FAMOTIDINE 20 MG: 20 INJECTION, SOLUTION INTRAVENOUS at 07:52

## 2018-05-30 RX ADMIN — METOCLOPRAMIDE 10 MG: 5 INJECTION, SOLUTION INTRAMUSCULAR; INTRAVENOUS at 05:19

## 2018-05-30 RX ADMIN — PROCHLORPERAZINE EDISYLATE 10 MG: 5 INJECTION INTRAMUSCULAR; INTRAVENOUS at 11:59

## 2018-05-30 NOTE — PLAN OF CARE
"Problem: Patient Care Overview  Goal: Plan of Care/Patient Progress Review  Outcome: Completed Date Met: 05/30/18  Pt is reporting \"I feel much better\" and denies nausea or vomiting this morning during assessments. IV Is infusing @ 125cc/hr via her PICC which is patent and non tender per pt. BP this morning was 87/45 right as pt was waking up; pt denies any dizziness or light headedness when up to void a few minutes later. Pt is tolerating a regular diet. Dr. Steen was updated on the above information.      "

## 2018-05-30 NOTE — PROGRESS NOTES
Discharge instructions printed out and gone over with patient and significant other. Patient verbalized understanding and had all questions answered.  Information given to patient on hyperemesis management education.  Discharge Medications gone over with patient and prescriptions sent to retail pharmacy for  on D/C.  Pt is set up for home infusion. Signature obtained. Pt escorted out with nursing staff and all belongings in hand to vehicle.

## 2018-05-30 NOTE — PLAN OF CARE
Problem: Hyperemesis Gravidarum (Adult,Obstetrics,Pediatric)  Goal: Signs and Symptoms of Listed Potential Problems Will be Absent, Minimized or Managed (Hyperemesis Gravidarum)  Signs and symptoms of listed potential problems will be absent, minimized or managed by discharge/transition of care (reference Hyperemesis Gravidarum (Adult,Obstetrics,Pediatric) CPG).  Outcome: Improving  Pt PICC patent, IVF infusing as ordered.  Pt denies nausea & vomiting, tolerates regular diet.  Took oral anti-emetic at bedtime per her request.  Excellent urine output.  Reports feeling much better.  Will continue to monitor & update as needed.

## 2018-05-30 NOTE — PROGRESS NOTES
Therapy: IV hydration  Insurance: [facility]  Copay per dispense: Yes    Co-Insurance: 100%    In reference to admission on 5/29/18 to check IV hydration coverage    Please contact Intake with any questions, [phone] or In Basket pool, [facility] ([id number]).

## 2018-05-30 NOTE — DISCHARGE SUMMARY
[facility] Discharge Summary    Stacie Daniels MRN# [id number]   Age: 28 year old YOB: 1990     Date of Admission:  2018  Date of Discharge::  2018  Admitting Physician:  Helen Smallwood MD  Discharge Physician:  Michael Steen MD     Home clinic: [facility]          Admission Diagnoses:   Hyperemesis  IUP 9+5 weeks           Discharge Diagnosis:   Same   Intrauterine pregnancy at 9+5 weeks gestation          Procedures:   Procedure(s): PICC Line       No other procedures performed during this admission           Medications Prior to Admission:     Prescriptions Prior to Admission   Medication Sig Dispense Refill Last Dose     metoclopramide (REGLAN) 5 MG tablet Take 1-2 tablets (5-10 mg) by mouth every 6 hours as needed 240 tablet 1 2018 at 0700     ondansetron (ZOFRAN ODT) 8 MG ODT tab Take 1 tablet (8 mg) by mouth 3 times daily (before meals) 60 tablet 1 2018 at 0700     promethazine (PHENERGAN) 25 MG tablet Take 1 tablet (25 mg) by mouth every 6 hours as needed for nausea 20 tablet 0 2018 at              Discharge Medications:     Current Discharge Medication List      CONTINUE these medications which have NOT CHANGED    Details   metoclopramide (REGLAN) 5 MG tablet Take 1-2 tablets (5-10 mg) by mouth every 6 hours as needed  Qty: 240 tablet, Refills: 1    Associated Diagnoses: Hyperemesis arising during pregnancy      ondansetron (ZOFRAN ODT) 8 MG ODT tab Take 1 tablet (8 mg) by mouth 3 times daily (before meals)  Qty: 60 tablet, Refills: 1    Associated Diagnoses: Nausea/vomiting in pregnancy      promethazine (PHENERGAN) 25 MG tablet Take 1 tablet (25 mg) by mouth every 6 hours as needed for nausea  Qty: 20 tablet, Refills: 0                   Consultations:   No consultations were requested during this admission          Brief History of Admission :    @ 9+5 weeks gestational age with severe hyperemesis gravidarum requiring  "admission and IV therapy           Hospital Course:   The patient's hospital course was unremarkable.  On discharge, her nausea was well controlled. Voiding in large amounts .  Ambulating well and tolerating a normal diet.  No fever.    No bowel movement yet.*  She was discharged on hospital  day #2.  BP (!) 87/45  Pulse 86  Temp 98.1  F (36.7  C) (Oral)  Resp 16  Ht 1.575 m (5' 2\")  Wt 51.7 kg (114 lb)  LMP  (LMP Unknown)  SpO2 100%  BMI 20.85 kg/m2  Exam:  Constitutional: healthy, alert and no distress  Head: Normocephalic. No masses, lesions, tenderness or abnormalities    Gastrointestinal: Abdomen soft, non-tender. BS normal. No masses, organomegaly  : Deferred  Musculoskeletal: extremities normal- no gross deformities noted, gait normal and normal muscle tone  Skin: no suspicious lesions or rashes  Neurologic: Gait normal. Reflexes normal and symmetric. Sensation grossly WNL.  Psychiatric: mentation appears normal and affect normal/bright      Post-partum hemoglobin:   Hemoglobin   Date Value Ref Range Status   05/29/2018 12.6 11.7 - 15.7 g/dL Final             Discharge Instructions and Follow-Up:   Discharge diet: Regular   Discharge activity: Activity as tolerated   Discharge follow-up: Follow up with Dr. Smalwlood  in 2 weeks   Wound care: Drink plenty of fluids           Discharge Disposition:   Admited to home care:   Agency: home care  For IV fluids   Discharged to home      Attestation:  I have reviewed today's vital signs, notes, medications, labs and imaging.    Michael Steen MD     "

## 2018-05-30 NOTE — DISCHARGE INSTRUCTIONS
Discharge diet: Regular   Discharge activity: Activity as tolerated   Discharge follow-up: Follow up with Dr. Smallwood  in 2 weeks   Wound care: Drink plenty of fluids

## 2018-05-31 NOTE — PROGRESS NOTES
This is a recent snapshot of the patient's [facility] medical record.  For current drug dose and complete information and questions, call [phone] or In Basket pool, [facility] ([id number])  CSN Number:  [id number]

## 2018-06-01 ENCOUNTER — HOME INFUSION (PRE-WILLOW HOME INFUSION) (OUTPATIENT)
Dept: PHARMACY | Facility: CLINIC | Age: 28
End: 2018-06-01

## 2018-06-05 ENCOUNTER — HOME INFUSION (PRE-WILLOW HOME INFUSION) (OUTPATIENT)
Dept: PHARMACY | Facility: CLINIC | Age: 28
End: 2018-06-05

## 2018-06-05 NOTE — PROGRESS NOTES
This is a recent snapshot of the patient's [facility] medical record.  For current drug dose and complete information and questions, call [phone] or In Basket pool, [facility] ([id number])  CSN Number:  [id number]

## 2018-06-06 NOTE — PROGRESS NOTES
This is a recent snapshot of the patient's [facility] medical record.  For current drug dose and complete information and questions, call [phone] or In [facility] pool, [facility] ([id number])  CSN Number:  [id number]

## 2018-06-11 ENCOUNTER — PRENATAL OFFICE VISIT (OUTPATIENT)
Dept: OBGYN | Facility: CLINIC | Age: 28
End: 2018-06-11
Payer: COMMERCIAL

## 2018-06-11 ENCOUNTER — HOME INFUSION (PRE-WILLOW HOME INFUSION) (OUTPATIENT)
Dept: PHARMACY | Facility: CLINIC | Age: 28
End: 2018-06-11

## 2018-06-11 VITALS
DIASTOLIC BLOOD PRESSURE: 81 MMHG | HEIGHT: 62 IN | BODY MASS INDEX: 21.16 KG/M2 | TEMPERATURE: 98.6 F | WEIGHT: 115 LBS | RESPIRATION RATE: 16 BRPM | HEART RATE: 92 BPM | SYSTOLIC BLOOD PRESSURE: 110 MMHG

## 2018-06-11 DIAGNOSIS — Z34.81 PRENATAL CARE, SUBSEQUENT PREGNANCY IN FIRST TRIMESTER: Primary | ICD-10-CM

## 2018-06-11 PROCEDURE — 99207 ZZC PRENATAL VISIT: CPT | Performed by: ADVANCED PRACTICE MIDWIFE

## 2018-06-11 NOTE — MR AVS SNAPSHOT
"              After Visit Summary   6/11/2018    Stacie Daniels    MRN: [id number]           Patient Information     Date Of Birth          1990        Visit Information        Provider Department      6/11/2018 10:15 AM Paola Jackson APRN CNM [facility]        Today's Diagnoses     Prenatal care, subsequent pregnancy in first trimester    -  1       Follow-ups after your visit        Follow-up notes from your care team     Return in about 4 weeks (around 7/9/2018) for Prenatal Visit.      Who to contact     If you have questions or need follow up information about today's clinic visit or your schedule please contact [facility] directly at [phone].  Normal or non-critical lab and imaging results will be communicated to you by MyChart, letter or phone within 4 business days after the clinic has received the results. If you do not hear from us within 7 days, please contact the clinic through MyChart or phone. If you have a critical or abnormal lab result, we will notify you by phone as soon as possible.  Submit refill requests through [facility] or call your pharmacy and they will forward the refill request to us. Please allow 3 business days for your refill to be completed.          Additional Information About Your Visit        Care EveryWhere ID     This is your Care EveryWhere ID. This could be used by other organizations to access your [place] medical records  [id number]        Your Vitals Were     Pulse Temperature Respirations Height Last Period BMI (Body Mass Index)    92 98.6  F (37  C) (Tympanic) 16 5' 2\" (1.575 m) (LMP Unknown) 21.03 kg/m2       Blood Pressure from Last 3 Encounters:   06/11/18 110/81   05/30/18 (!) 87/45   05/29/18 120/81    Weight from Last 3 Encounters:   06/11/18 115 lb (52.2 kg)   05/29/18 114 lb (51.7 kg)   05/29/18 114 lb 12.8 oz (52.1 kg)              Today, you had the following     No orders found for display       Primary Care " Provider Office Phone # Fax #    STEPHANIE Suarez [facility] [phone] [address] [facility] [address]        Equal Access to Services     MAYNOR GOLDEN : Hadcharlette miguel velez sandritao Sosarahali, waaxda luqadaha, qaybta kaalmada adeegyada, margaux colon laNeptaliwin estrada. So [facility] [phone].    ATENCIÓN: Si habla español, tiene a donovan disposición servicios gratuitos de asistencia lingüística. Llame al [phone].    We comply with applicable federal civil rights laws and [place] laws. We do not discriminate on the basis of race, color, national origin, age, disability, sex, sexual orientation, or gender identity.            Thank you!     Thank you for choosing [facility]  for your care. Our goal is always to provide you with excellent care. Hearing back from our patients is one way we can continue to improve our services. Please take a few minutes to complete the written survey that you may receive in the mail after your visit with us. Thank you!             Your Updated Medication List - Protect others around you: Learn how to safely use, store and throw away your medicines at www.disposemymeds.org.          This list is accurate as of 6/11/18 10:43 AM.  Always use your most recent med list.                   Brand Name Dispense Instructions for use Diagnosis    metoclopramide 5 MG tablet    REGLAN    240 tablet    Take 1-2 tablets (5-10 mg) by mouth every 6 hours as needed    Hyperemesis arising during pregnancy       ondansetron 8 MG ODT tab    ZOFRAN ODT    60 tablet    Take 1 tablet (8 mg) by mouth 3 times daily (before meals)    Nausea/vomiting in pregnancy       prochlorperazine 25 MG Suppository    COMPAZINE    20 suppository    Place 1 suppository (25 mg) rectally every 12 hours as needed for nausea    Hyperemesis arising during pregnancy       * promethazine 25 MG tablet    PHENERGAN    20 tablet    Take 1 tablet (25 mg) by mouth every 6 hours as needed for  nausea        * promethazine 25 MG tablet    PHENERGAN    20 tablet    Take 1 tablet (25 mg) by mouth every 6 hours as needed for nausea    Intractable cyclical vomiting with nausea, Hyperemesis arising during pregnancy       * Notice:  This list has 2 medication(s) that are the same as other medications prescribed for you. Read the directions carefully, and ask your doctor or other care provider to review them with you.

## 2018-06-11 NOTE — PROGRESS NOTES
"S: Patient feels much better since discharge from the hospital. She is getting IV infusions daily and taking her oral medications without difficulty. Prenatal labs WNL, will schedule next U/S at convenience.   Patient has had nausea and has had vomiting  O: /81 (BP Location: Left arm, Patient Position: Chair, Cuff Size: Adult Regular)  Pulse 92  Temp 98.6  F (37  C) (Tympanic)  Resp 16  Ht 5' 2\" (1.575 m)  Wt 115 lb (52.2 kg)  LMP  (LMP Unknown)  BMI 21.03 kg/m2       Exam:  Constitutional: healthy, alert and no distress  Respiratory: Respirations even and unlabored  Gastrointestinal: Abdomen soft, non-tender. Fundus measures appropriately for gestational age. Fetal heart tones heard easily  Psychiatric: mentation appears normal and affect normal/bright  A: (Z34.81) Prenatal care, subsequent pregnancy in first trimester  (primary encounter diagnosis)    P:  Educated about diet and exercise and normal weight gain  Normal to feel movement between 18-22 weeks  Reviewed labs from 1st OB  Warning signs discussed  Discussed option for Quad screen at next visit.   Return to clinic 4 weeks  Encouraged patient to call with any questions or concerns.    Paola Jackson CNM        "

## 2018-06-15 ENCOUNTER — TELEPHONE (OUTPATIENT)
Dept: OBGYN | Facility: CLINIC | Age: 28
End: 2018-06-15

## 2018-06-15 NOTE — TELEPHONE ENCOUNTER
Patient will receive ultrasound order at her next visit on 7/16/18. She understand this and is okay waiting until then.    Chayito Brian LPN

## 2018-06-15 NOTE — PROGRESS NOTES
This is a recent snapshot of the patient's [facility] medical record.  For current drug dose and complete information and questions, call [phone] or In Basket pool, [facility] ([id number])  CSN Number:  [id number]

## 2018-06-15 NOTE — TELEPHONE ENCOUNTER
Pt reports that when she was in for her ob visit on Monday the Nurse Midwife said she would place an order for the 20wk US.  When she called Medical imaging she was told that the order had not been placed.      To provider to place order.    [place]  [facility] RN

## 2018-06-15 NOTE — TELEPHONE ENCOUNTER
Reason for Call: Request for an order or referral:    Order or referral being requested: 20-week OB ultrasound    Date needed: Should be needed around 08/10/2018    Has the patient been seen by the PCP for this problem? Not Applicable    Additional comments: NA    Phone number Patient can be reached at:  Home number on file [phone] (home)    Best Time:  Any    Can we leave a detailed message on this number?  NO    Call taken on 6/15/2018 at 11:00 AM by Denise Behrendt

## 2018-06-26 ENCOUNTER — HOME INFUSION (PRE-WILLOW HOME INFUSION) (OUTPATIENT)
Dept: PHARMACY | Facility: CLINIC | Age: 28
End: 2018-06-26

## 2018-06-27 NOTE — PROGRESS NOTES
This is a recent snapshot of the patient's [facility] medical record.  For current drug dose and complete information and questions, call [phone] or In Basket pool, [facility] ([id number])  CSN Number:  [id number]

## 2018-07-06 ENCOUNTER — TELEPHONE (OUTPATIENT)
Dept: OBGYN | Facility: CLINIC | Age: 28
End: 2018-07-06

## 2018-07-06 DIAGNOSIS — O21.0 HYPEREMESIS ARISING DURING PREGNANCY: ICD-10-CM

## 2018-07-06 RX ORDER — PROCHLORPERAZINE 25 MG
25 SUPPOSITORY, RECTAL RECTAL EVERY 12 HOURS PRN
Qty: 20 SUPPOSITORY | Refills: 1 | Status: ON HOLD | OUTPATIENT
Start: 2018-07-06 | End: 2018-12-22

## 2018-07-06 NOTE — TELEPHONE ENCOUNTER
Last office visit 6/11/18  N & V noted.  Will refill per RN guidelines.    Tried to contact patient to notify of refill - unable to reach and unable to leave a message as VM not available.    Hilda Talley   [facility]  RN.

## 2018-07-06 NOTE — TELEPHONE ENCOUNTER
Reason for Call:  Medication or medication refill:    Do you use a [facility]?  Name of the pharmacy and phone number for the current request:  [facility]     Name of the medication requested: Compazine supositories - Prochlorperazine     Other request:     Can we leave a detailed message on this number? No - unable    Phone number patient can be reached at: Home number on file [phone] (home)    Best Time: any    Call taken on 7/6/2018 at 8:19 AM by Olga Nieves

## 2018-07-16 ENCOUNTER — PRENATAL OFFICE VISIT (OUTPATIENT)
Dept: OBGYN | Facility: CLINIC | Age: 28
End: 2018-07-16
Payer: COMMERCIAL

## 2018-07-16 VITALS
BODY MASS INDEX: 22.08 KG/M2 | HEART RATE: 92 BPM | HEIGHT: 62 IN | TEMPERATURE: 97.9 F | SYSTOLIC BLOOD PRESSURE: 106 MMHG | RESPIRATION RATE: 16 BRPM | WEIGHT: 120 LBS | DIASTOLIC BLOOD PRESSURE: 72 MMHG

## 2018-07-16 DIAGNOSIS — Z34.82 PRENATAL CARE, SUBSEQUENT PREGNANCY IN SECOND TRIMESTER: Primary | ICD-10-CM

## 2018-07-16 DIAGNOSIS — G47.09 OTHER INSOMNIA: ICD-10-CM

## 2018-07-16 PROCEDURE — 99207 ZZC PRENATAL VISIT: CPT | Performed by: OBSTETRICS & GYNECOLOGY

## 2018-07-16 RX ORDER — HYDROXYZINE HYDROCHLORIDE 25 MG/1
25-50 TABLET, FILM COATED ORAL EVERY 6 HOURS PRN
Qty: 60 TABLET | Refills: 1 | Status: SHIPPED | OUTPATIENT
Start: 2018-07-16 | End: 2018-09-07

## 2018-07-16 NOTE — MR AVS SNAPSHOT
"              After Visit Summary   7/16/2018    Stacie Daniels    MRN: [id number]           Patient Information     Date Of Birth          1990        Visit Information        Provider Department      7/16/2018 11:30 AM Helen Smallwood MD [facility]        Today's Diagnoses     Prenatal care, subsequent pregnancy in second trimester    -  1    Other insomnia           Follow-ups after your visit        Future tests that were ordered for you today     Open Future Orders        Priority Expected Expires Ordered    US OB > 14 Weeks Complete Single Routine  7/16/2019 7/16/2018            Who to contact     If you have questions or need follow up information about today's clinic visit or your schedule please contact [facility] directly at [phone].  Normal or non-critical lab and imaging results will be communicated to you by MyChart, letter or phone within 4 business days after the clinic has received the results. If you do not hear from us within 7 days, please contact the clinic through MyChart or phone. If you have a critical or abnormal lab result, we will notify you by phone as soon as possible.  Submit refill requests through [employer] or call your pharmacy and they will forward the refill request to us. Please allow 3 business days for your refill to be completed.          Additional Information About Your Visit        Care EveryWhere ID     This is your Care EveryWhere ID. This could be used by other organizations to access your [facility] medical records  [id number]        Your Vitals Were     Pulse Temperature Respirations Height Last Period Breastfeeding?    92 97.9  F (36.6  C) (Tympanic) 16 5' 2\" (1.575 m) (LMP Unknown) No    BMI (Body Mass Index)                   21.95 kg/m2            Blood Pressure from Last 3 Encounters:   07/16/18 106/72   06/11/18 110/81   05/30/18 (!) 87/45    Weight from Last 3 Encounters:   07/16/18 120 lb (54.4 kg)   06/11/18 115 lb (52.2 " kg)   05/29/18 114 lb (51.7 kg)                 Today's Medication Changes          These changes are accurate as of 7/16/18 12:02 PM.  If you have any questions, ask your nurse or doctor.               Start taking these medicines.        Dose/Directions    hydrOXYzine 25 MG tablet   Commonly known as:  ATARAX   Used for:  Other insomnia   Started by:  Helen Smallwood MD        Dose:  25-50 mg   Take 1-2 tablets (25-50 mg) by mouth every 6 hours as needed for anxiety   Quantity:  60 tablet   Refills:  1         These medicines have changed or have updated prescriptions.        Dose/Directions    promethazine 25 MG tablet   Commonly known as:  PHENERGAN   This may have changed:  Another medication with the same name was removed. Continue taking this medication, and follow the directions you see here.   Changed by:  Helen Smallwood MD        Dose:  25 mg   Take 1 tablet (25 mg) by mouth every 6 hours as needed for nausea   Quantity:  20 tablet   Refills:  0            Where to get your medicines      These medications were sent to [facility] [place], [place] - [address]  [address] [address]     Phone:  [phone]     hydrOXYzine 25 MG tablet                Primary Care Provider Office Phone # Fax #    Kimberley STEPHANIE Weeks [facility] [phone] [address] [address]        Equal Access to Services     MAYNOR GOLDEN AH: Hadii aad ku hadasho Soomaali, waaxda luqadaha, qaybta kaalmada adeegyada, waxjay ovalle haywin estrada. So [facility] [phone].    ATENCIÓN: Si habla español, tiene a donovan disposición servicios gratuitos de asistencia lingüística. Llame al [phone].    We comply with applicable federal civil rights laws and [place] laws. We do not discriminate on the basis of race, color, national origin, age, disability, sex, sexual orientation, or gender identity.            Thank you!     Thank you for choosing [facility]  for your care. Our goal is always to provide you with excellent care. Hearing back from our patients is one way we can continue to improve our services. Please take a few minutes to complete the written survey that you may receive in the mail after your visit with us. Thank you!             Your Updated Medication List - Protect others around you: Learn how to safely use, store and throw away your medicines at www.disposemymeds.org.          This list is accurate as of 7/16/18 12:02 PM.  Always use your most recent med list.                   Brand Name Dispense Instructions for use Diagnosis    hydrOXYzine 25 MG tablet    ATARAX    60 tablet    Take 1-2 tablets (25-50 mg) by mouth every 6 hours as needed for anxiety    Other insomnia       metoclopramide 5 MG tablet    REGLAN    240 tablet    Take 1-2 tablets (5-10 mg) by mouth every 6 hours as needed    Hyperemesis arising during pregnancy       ondansetron 8 MG ODT tab    ZOFRAN ODT    60 tablet    Take 1 tablet (8 mg) by mouth 3 times daily (before meals)    Nausea/vomiting in pregnancy       prochlorperazine 25 MG Suppository    COMPAZINE    20 suppository    Place 1 suppository (25 mg) rectally every 12 hours as needed for nausea    Hyperemesis arising during pregnancy       promethazine 25 MG tablet    PHENERGAN    20 tablet    Take 1 tablet (25 mg) by mouth every 6 hours as needed for nausea

## 2018-07-16 NOTE — PROGRESS NOTES
"CC: Here for routine prenatal visit @ 16w3d   HPI: + FM, no ctx, no LOF, no VB.  No complaints.     PE: /72 (BP Location: Left arm, Patient Position: Chair, Cuff Size: Adult Regular)  Pulse 92  Temp 97.9  F (36.6  C) (Tympanic)  Resp 16  Ht 5' 2\" (1.575 m)  Wt 120 lb (54.4 kg)  LMP  (LMP Unknown)  Breastfeeding? No  BMI 21.95 kg/m2   See OB flowsheet      A/P  @ 16w3d normal pregnancy    1. Routine prenatal care.  Declined genetic screening.  Anomaly screen ordered.  Vistaril refilled.    2. HEG: PICC in place    RTC 4 weeks.      Helen Smallwood M.D.    "

## 2018-07-27 ENCOUNTER — HOME INFUSION (PRE-WILLOW HOME INFUSION) (OUTPATIENT)
Dept: PHARMACY | Facility: CLINIC | Age: 28
End: 2018-07-27

## 2018-07-30 ENCOUNTER — HOME INFUSION (PRE-WILLOW HOME INFUSION) (OUTPATIENT)
Dept: PHARMACY | Facility: CLINIC | Age: 28
End: 2018-07-30

## 2018-07-30 NOTE — PROGRESS NOTES
This is a recent snapshot of the patient's [facility] medical record.  For current drug dose and complete information and questions, call [phone] or In Basket pool, [facility] ([id number])  CSN Number:  [id number]

## 2018-08-03 ENCOUNTER — HOME INFUSION (PRE-WILLOW HOME INFUSION) (OUTPATIENT)
Dept: PHARMACY | Facility: CLINIC | Age: 28
End: 2018-08-03

## 2018-08-06 ENCOUNTER — TELEPHONE (OUTPATIENT)
Dept: OBGYN | Facility: CLINIC | Age: 28
End: 2018-08-06

## 2018-08-06 ENCOUNTER — HOME INFUSION (PRE-WILLOW HOME INFUSION) (OUTPATIENT)
Dept: PHARMACY | Facility: CLINIC | Age: 28
End: 2018-08-06

## 2018-08-06 NOTE — TELEPHONE ENCOUNTER
Spoke with patient on the phone.  Patient reports she would like to keep everything active for 1 more week.  Patient a little apprehensive to discontinue orders.    [facility], [place], notified.    Hilda Talley   Ob/Gyn Clinic  RN

## 2018-08-06 NOTE — TELEPHONE ENCOUNTER
Reason for Call:  Other call back    Detailed comments: [facility] calling - pt has had orders for Fluids and IV Zofran but has not had to use them for the past 2 weeks.  Requesting orders to discontinue - requesting verbal order.    Phone Number Patient can be reached at: [phone]    Best Time: any    Can we leave a detailed message on this number? YES    Call taken on 8/6/2018 at 10:16 AM by Olga Nieves

## 2018-08-07 NOTE — PROGRESS NOTES
This is a recent snapshot of the patient's [facility] medical record.  For current drug dose and complete information and questions, call [phone] or In Basket pool, [facility] ([id number])  CSN Number:  [id number]

## 2018-08-08 NOTE — PROGRESS NOTES
This is a recent snapshot of the patient's [facility] medical record.  For current drug dose and complete information and questions, call [phone] or In Basket pool, [facility] ([id number])  CSN Number:  [id number]

## 2018-08-13 ENCOUNTER — HOSPITAL ENCOUNTER (OUTPATIENT)
Dept: ULTRASOUND IMAGING | Facility: CLINIC | Age: 28
Discharge: HOME | End: 2018-08-13
Attending: OBSTETRICS & GYNECOLOGY | Admitting: OBSTETRICS & GYNECOLOGY
Payer: COMMERCIAL

## 2018-08-13 DIAGNOSIS — Z34.82 PRENATAL CARE, SUBSEQUENT PREGNANCY IN SECOND TRIMESTER: Primary | ICD-10-CM

## 2018-08-13 DIAGNOSIS — Z34.82 PRENATAL CARE, SUBSEQUENT PREGNANCY IN SECOND TRIMESTER: ICD-10-CM

## 2018-08-13 PROCEDURE — 76805 OB US >/= 14 WKS SNGL FETUS: CPT

## 2018-08-15 ENCOUNTER — HOME INFUSION (PRE-WILLOW HOME INFUSION) (OUTPATIENT)
Dept: PHARMACY | Facility: CLINIC | Age: 28
End: 2018-08-15

## 2018-08-16 ENCOUNTER — PRENATAL OFFICE VISIT (OUTPATIENT)
Dept: OBGYN | Facility: CLINIC | Age: 28
End: 2018-08-16
Payer: COMMERCIAL

## 2018-08-16 VITALS
SYSTOLIC BLOOD PRESSURE: 114 MMHG | RESPIRATION RATE: 16 BRPM | WEIGHT: 133.2 LBS | DIASTOLIC BLOOD PRESSURE: 78 MMHG | HEIGHT: 62 IN | TEMPERATURE: 97.7 F | BODY MASS INDEX: 24.51 KG/M2 | HEART RATE: 97 BPM

## 2018-08-16 DIAGNOSIS — Z34.82 PRENATAL CARE, SUBSEQUENT PREGNANCY IN SECOND TRIMESTER: Primary | ICD-10-CM

## 2018-08-16 DIAGNOSIS — F41.1 GENERALIZED ANXIETY DISORDER: ICD-10-CM

## 2018-08-16 PROCEDURE — 99207 ZZC PRENATAL VISIT: CPT | Performed by: OBSTETRICS & GYNECOLOGY

## 2018-08-16 ASSESSMENT — ANXIETY QUESTIONNAIRES
GAD7 TOTAL SCORE: 19
6. BECOMING EASILY ANNOYED OR IRRITABLE: NEARLY EVERY DAY
5. BEING SO RESTLESS THAT IT IS HARD TO SIT STILL: SEVERAL DAYS
7. FEELING AFRAID AS IF SOMETHING AWFUL MIGHT HAPPEN: NEARLY EVERY DAY
3. WORRYING TOO MUCH ABOUT DIFFERENT THINGS: NEARLY EVERY DAY
2. NOT BEING ABLE TO STOP OR CONTROL WORRYING: NEARLY EVERY DAY
1. FEELING NERVOUS, ANXIOUS, OR ON EDGE: NEARLY EVERY DAY

## 2018-08-16 ASSESSMENT — PATIENT HEALTH QUESTIONNAIRE - PHQ9: 5. POOR APPETITE OR OVEREATING: NEARLY EVERY DAY

## 2018-08-16 NOTE — NURSING NOTE
"Initial /78 (BP Location: Left arm, Patient Position: Chair, Cuff Size: Adult Regular)  Pulse 97  Temp 97.7  F (36.5  C) (Tympanic)  Resp 16  Ht 5' 2\" (1.575 m)  Wt 133 lb 3.2 oz (60.4 kg)  LMP  (LMP Unknown)  Breastfeeding? No  BMI 24.36 kg/m2 Estimated body mass index is 24.36 kg/(m^2) as calculated from the following:    Height as of this encounter: 5' 2\" (1.575 m).    Weight as of this encounter: 133 lb 3.2 oz (60.4 kg). .    Dottie Major CMA    "

## 2018-08-16 NOTE — MR AVS SNAPSHOT
After Visit Summary   8/16/2018    Stacie Daniels    MRN: [id number]           Patient Information     Date Of Birth          1990        Visit Information        Provider Department      8/16/2018 9:00 AM Helen Smallwood MD [facility]        Today's Diagnoses     Prenatal care, subsequent pregnancy in second trimester    -  1    Generalized anxiety disorder           Follow-ups after your visit        Your next 10 appointments already scheduled     Aug 20, 2018  2:20 PM CDT   US OB SINGLE FOLLOW UP REPEAT with [id number]   [facility]    [address] [address]   [phone]           Please bring a list of your medicines (including vitamins, minerals and over-the-counter drugs). Also, tell your doctor about any allergies you may have. Wear comfortable clothes and leave your valuables at home.  Drink four 8-ounce glasses of fluid an hour before your exam. If you need to empty your bladder before your exam, try to release only a little urine. Then, drink another glass of fluid.  You may have up to two family members in the exam room. If you bring a small child, an adult must be there to care for him or her. No video or camera photography during the procedure.  Please call the Imaging Department at your exam site with any questions.              Future tests that were ordered for you today     Open Future Orders        Priority Expected Expires Ordered    Treponema Abs w Reflex to RPR and Titer Routine  12/14/2018 8/16/2018    Glucose tolerance gest screen 1 hour Routine  12/14/2018 8/16/2018    CBC with platelets Routine  12/14/2018 8/16/2018            Who to contact     If you have questions or need follow up information about today's clinic visit or your schedule please contact [facility] directly at [phone].  Normal or non-critical lab and imaging results will be communicated to you by [employer]  "letter or phone within 4 business days after the clinic has received the results. If you do not hear from us within 7 days, please contact the clinic through [email or web address] or phone. If you have a critical or abnormal lab result, we will notify you by phone as soon as possible.  Submit refill requests through [employer] or call your pharmacy and they will forward the refill request to us. Please allow 3 business days for your refill to be completed.          Additional Information About Your Visit        Care EveryWhere ID     This is your Care EveryWhere ID. This could be used by other organizations to access your [facility] medical records  [id number]        Your Vitals Were     Pulse Temperature Respirations Height Last Period Breastfeeding?    97 97.7  F (36.5  C) (Tympanic) 16 5' 2\" (1.575 m) (LMP Unknown) No    BMI (Body Mass Index)                   24.36 kg/m2            Blood Pressure from Last 3 Encounters:   08/16/18 114/78   07/16/18 106/72   06/11/18 110/81    Weight from Last 3 Encounters:   08/16/18 133 lb 3.2 oz (60.4 kg)   07/16/18 120 lb (54.4 kg)   06/11/18 115 lb (52.2 kg)                 Today's Medication Changes          These changes are accurate as of 8/16/18  9:17 AM.  If you have any questions, ask your nurse or doctor.               Start taking these medicines.        Dose/Directions    sertraline 50 MG tablet   Commonly known as:  ZOLOFT   Used for:  Generalized anxiety disorder   Started by:  Helen Smallwood MD        Take 1/2 tablet (25 mg) for 1-2 weeks, then increase to 1 tablet orally daily   Quantity:  30 tablet   Refills:  1            Where to get your medicines      These medications were sent to [facility] [place], [place] - [address]  [address] [address]     Phone:  [phone]     sertraline 50 MG tablet                Primary Care Provider Office Phone # Fax #    Kimberley STEPHANIE Weeks [phone]       [address] " [facility] [address]        Equal Access to Services     [facility] : Hadii miguel velez sandritabenita Sozackery, waaxda luqadaha, qaybta kaalmakaushal hay, margaux mullinskariisabela estrada. So [facility] [phone].    ATENCIÓN: Si habla español, tiene a donovan disposición servicios gratuitos de asistencia lingüística. [facility] [phone].    We comply with applicable federal civil rights laws and [place] laws. We do not discriminate on the basis of race, color, national origin, age, disability, sex, sexual orientation, or gender identity.            Thank you!     Thank you for choosing [facility]  for your care. Our goal is always to provide you with excellent care. Hearing back from our patients is one way we can continue to improve our services. Please take a few minutes to complete the written survey that you may receive in the mail after your visit with us. Thank you!             Your Updated Medication List - Protect others around you: Learn how to safely use, store and throw away your medicines at www.disposemymeds.org.          This list is accurate as of 8/16/18  9:17 AM.  Always use your most recent med list.                   Brand Name Dispense Instructions for use Diagnosis    hydrOXYzine 25 MG tablet    ATARAX    60 tablet    Take 1-2 tablets (25-50 mg) by mouth every 6 hours as needed for anxiety    Other insomnia       metoclopramide 5 MG tablet    REGLAN    240 tablet    Take 1-2 tablets (5-10 mg) by mouth every 6 hours as needed    Hyperemesis arising during pregnancy       ondansetron 8 MG ODT tab    ZOFRAN ODT    60 tablet    Take 1 tablet (8 mg) by mouth 3 times daily (before meals)    Nausea/vomiting in pregnancy       prochlorperazine 25 MG Suppository    COMPAZINE    20 suppository    Place 1 suppository (25 mg) rectally every 12 hours as needed for nausea    Hyperemesis arising during pregnancy       promethazine 25 MG tablet    PHENERGAN    20 tablet    Take 1 tablet (25 mg) by  mouth every 6 hours as needed for nausea        sertraline 50 MG tablet    ZOLOFT    30 tablet    Take 1/2 tablet (25 mg) for 1-2 weeks, then increase to 1 tablet orally daily    Generalized anxiety disorder

## 2018-08-16 NOTE — PROGRESS NOTES
"CC: Here for routine prenatal visit @ 20w6d   HPI: + FM, no ctx, no LOF, no VB.  No complaints.     PE: /78 (BP Location: Left arm, Patient Position: Chair, Cuff Size: Adult Regular)  Pulse 97  Temp 97.7  F (36.5  C) (Tympanic)  Resp 16  Ht 5' 2\" (1.575 m)  Wt 133 lb 3.2 oz (60.4 kg)  LMP  (LMP Unknown)  Breastfeeding? No  BMI 24.36 kg/m2   See OB flowsheet    U/S WNL but limited views of the spine  PICC removed without complication.  ACE wrap in place.     A/P  @ 20w6d normal pregnancy    1. Routine prenatal care  2. HEG: improved.  PICC out  3. Depression/anxiety: will resume zoloft    RTC 4 weeks.      Helen Smallwood M.D.    "

## 2018-08-16 NOTE — PROGRESS NOTES
This is a recent snapshot of the patient's [facility] medical record.  For current drug dose and complete information and questions, call [phone] or In Basket pool, [facility] ([id number])  CSN Number:  [id number]

## 2018-08-17 ASSESSMENT — ANXIETY QUESTIONNAIRES: GAD7 TOTAL SCORE: 19

## 2018-08-17 ASSESSMENT — PATIENT HEALTH QUESTIONNAIRE - PHQ9: SUM OF ALL RESPONSES TO PHQ QUESTIONS 1-9: 19

## 2018-08-28 ENCOUNTER — TELEPHONE (OUTPATIENT)
Dept: OBGYN | Facility: CLINIC | Age: 28
End: 2018-08-28

## 2018-08-28 ENCOUNTER — HOSPITAL ENCOUNTER (OUTPATIENT)
Dept: ULTRASOUND IMAGING | Facility: CLINIC | Age: 28
Discharge: HOME | End: 2018-08-28
Attending: OBSTETRICS & GYNECOLOGY | Admitting: OBSTETRICS & GYNECOLOGY
Payer: COMMERCIAL

## 2018-08-28 DIAGNOSIS — Z34.82 PRENATAL CARE, SUBSEQUENT PREGNANCY IN SECOND TRIMESTER: ICD-10-CM

## 2018-08-28 PROCEDURE — 76816 OB US FOLLOW-UP PER FETUS: CPT

## 2018-08-28 NOTE — TELEPHONE ENCOUNTER
Return call to patient.  Spoke with patient on the phone.    Patient desires to know gender of baby. On 2 prior ultrasounds, gender was inconsistent so patient is confused what the gender really is. Patient would like another US ordered to clarify gender.    Patient advised that unfortunately, unless there is a medical reason for the US, another US would not be able to be ordered for gender disclosure. Apologized to patient for inconsistent reports of gender.    Recommended patient discuss further with MD at next clinic appointment.    Pt in agreement and reports understanding.    Hilda Talley   Ob/Gyn Clinic  RN

## 2018-08-28 NOTE — TELEPHONE ENCOUNTER
Pt called stating that she has had 2 ultrasounds and that one said she was carrying a boy while the other said that she was carrying a girl and pt would like to know that when she has her next appointment on 09/13/18 and they do a growth check if they could do another ultrasound to definitively find out what the gender of the baby is. Please advise.    Talya Brown  [facility] [place]

## 2018-09-07 DIAGNOSIS — G47.09 OTHER INSOMNIA: ICD-10-CM

## 2018-09-07 RX ORDER — HYDROXYZINE HYDROCHLORIDE 25 MG/1
25-50 TABLET, FILM COATED ORAL EVERY 6 HOURS PRN
Qty: 60 TABLET | Refills: 1 | Status: SHIPPED | OUTPATIENT
Start: 2018-09-07 | End: 2018-11-13

## 2018-09-07 NOTE — TELEPHONE ENCOUNTER
Last prescription 7/16/18  Last office visit 8/16/18  Patient currently 24W0D pregnant  Also on Zoloft.  Last CHARMAINE-7 score 19 on 8/16/18  Last PHQ-9 score 19 on 8/16/18    Will send to Dr. Smallwood for review.  Med cued in section.    Hilda Talley   Ob/Gyn Clinic  RN

## 2018-09-13 ENCOUNTER — HOSPITAL ENCOUNTER (EMERGENCY)
Facility: CLINIC | Age: 28
Discharge: HOME | End: 2018-09-13
Attending: EMERGENCY MEDICINE | Admitting: EMERGENCY MEDICINE
Payer: COMMERCIAL

## 2018-09-13 ENCOUNTER — PRENATAL OFFICE VISIT (OUTPATIENT)
Dept: OBGYN | Facility: CLINIC | Age: 28
End: 2018-09-13
Payer: COMMERCIAL

## 2018-09-13 VITALS
SYSTOLIC BLOOD PRESSURE: 98 MMHG | OXYGEN SATURATION: 98 % | TEMPERATURE: 97.8 F | HEART RATE: 97 BPM | DIASTOLIC BLOOD PRESSURE: 60 MMHG | RESPIRATION RATE: 21 BRPM

## 2018-09-13 VITALS
DIASTOLIC BLOOD PRESSURE: 71 MMHG | TEMPERATURE: 98.4 F | SYSTOLIC BLOOD PRESSURE: 111 MMHG | WEIGHT: 136.8 LBS | HEART RATE: 88 BPM | BODY MASS INDEX: 25.02 KG/M2

## 2018-09-13 DIAGNOSIS — R55 VASOVAGAL SYNCOPE: ICD-10-CM

## 2018-09-13 DIAGNOSIS — Z34.82 PRENATAL CARE, SUBSEQUENT PREGNANCY IN SECOND TRIMESTER: Primary | ICD-10-CM

## 2018-09-13 DIAGNOSIS — Z34.82 PRENATAL CARE, SUBSEQUENT PREGNANCY IN SECOND TRIMESTER: ICD-10-CM

## 2018-09-13 DIAGNOSIS — Z3A.24 24 WEEKS GESTATION OF PREGNANCY: ICD-10-CM

## 2018-09-13 LAB
ERYTHROCYTE [DISTWIDTH] IN BLOOD BY AUTOMATED COUNT: 12.1 % (ref 10–15)
GLUCOSE 1H P 50 G GLC PO SERPL-MCNC: 124 MG/DL (ref 60–129)
GLUCOSE BLDC GLUCOMTR-MCNC: 95 MG/DL (ref 70–99)
HCT VFR BLD AUTO: 35.9 % (ref 35–47)
HGB BLD-MCNC: 12.1 G/DL (ref 11.7–15.7)
MCH RBC QN AUTO: 32.9 PG (ref 26.5–33)
MCHC RBC AUTO-ENTMCNC: 33.7 G/DL (ref 31.5–36.5)
MCV RBC AUTO: 98 FL (ref 78–100)
PLATELET # BLD AUTO: 171 10E9/L (ref 150–450)
RBC # BLD AUTO: 3.68 10E12/L (ref 3.8–5.2)
WBC # BLD AUTO: 9.9 10E9/L (ref 4–11)

## 2018-09-13 PROCEDURE — 99207 ZZC PRENATAL VISIT: CPT | Performed by: OBSTETRICS & GYNECOLOGY

## 2018-09-13 PROCEDURE — 36415 COLL VENOUS BLD VENIPUNCTURE: CPT | Performed by: OBSTETRICS & GYNECOLOGY

## 2018-09-13 PROCEDURE — 76815 OB US LIMITED FETUS(S): CPT

## 2018-09-13 PROCEDURE — 00000146 ZZHCL STATISTIC GLUCOSE BY METER IP

## 2018-09-13 PROCEDURE — 99285 EMERGENCY DEPT VISIT HI MDM: CPT | Mod: 25

## 2018-09-13 PROCEDURE — 93010 ELECTROCARDIOGRAM REPORT: CPT | Mod: Z6 | Performed by: EMERGENCY MEDICINE

## 2018-09-13 PROCEDURE — 82950 GLUCOSE TEST: CPT | Performed by: OBSTETRICS & GYNECOLOGY

## 2018-09-13 PROCEDURE — 85027 COMPLETE CBC AUTOMATED: CPT | Performed by: OBSTETRICS & GYNECOLOGY

## 2018-09-13 PROCEDURE — 86780 TREPONEMA PALLIDUM: CPT | Performed by: OBSTETRICS & GYNECOLOGY

## 2018-09-13 PROCEDURE — 93005 ELECTROCARDIOGRAM TRACING: CPT

## 2018-09-13 PROCEDURE — 99285 EMERGENCY DEPT VISIT HI MDM: CPT | Mod: 25 | Performed by: EMERGENCY MEDICINE

## 2018-09-13 RX ORDER — MULTIVITAMIN,THERAPEUTIC
1 TABLET ORAL DAILY
COMMUNITY
End: 2019-03-21

## 2018-09-13 RX ORDER — SODIUM CHLORIDE 9 MG/ML
1000 INJECTION, SOLUTION INTRAVENOUS CONTINUOUS
Status: DISCONTINUED | OUTPATIENT
Start: 2018-09-13 | End: 2018-09-13 | Stop reason: CLARIF

## 2018-09-13 NOTE — ED AVS SNAPSHOT
[facility] Emergency Department    [address] [address]    Phone:  [phone]    Fax:  [phone]                                       Stacie Daniels   MRN: [id number]    Department:  [facility] Emergency Department   Date of Visit:  9/13/2018           Patient Information     Date Of Birth          1990        Your diagnoses for this visit were:     Vasovagal syncope        You were seen by Ashok Harry MD.        Discharge Instructions       Return to the emergency department if you have abdominal pain, vaginal bleeding, repeated syncopal episodes, difficulty breathing, or other concerns.  Otherwise follow-up in clinic    Your next 10 appointments already scheduled     Sep 13, 2018  3:45 PM CDT   LAB with [facility] ([facility])    [address] [place] [address] [phone]           Please do not eat 10-12 hours before your appointment if you are coming in fasting for labs on lipids, cholesterol, or glucose (sugar). This does not apply to pregnant women. Water, hot tea and black coffee (with nothing added) are okay. Do not drink other fluids, diet soda or chew gum.            Sep 13, 2018  4:00 PM CDT   ESTABLISHED PRENATAL with Brianna Mar MD   [facility] ([facility]    [address] [place] [address] [phone]              24 Hour Appointment Hotline       To make an appointment at any [facility], call [phone] ([phone]). If you don't have a family doctor or clinic, we will help you find one. [facility] are conveniently located to serve the needs of you and your family.             Review of your medicines      Our records show that you are taking the medicines listed below. If these are incorrect, please call your family doctor or clinic.        Dose / Directions Last dose taken    hydrOXYzine 25 MG tablet   Commonly known  as:  ATARAX   Dose:  25-50 mg   Quantity:  60 tablet        Take 1-2 tablets (25-50 mg) by mouth every 6 hours as needed for anxiety   Refills:  1        metoclopramide 5 MG tablet   Commonly known as:  REGLAN   Dose:  5-10 mg   Quantity:  240 tablet        Take 1-2 tablets (5-10 mg) by mouth every 6 hours as needed   Refills:  1        multivitamin, therapeutic Tabs tablet   Dose:  1 tablet        Take 1 tablet by mouth daily   Refills:  0        ondansetron 8 MG ODT tab   Commonly known as:  ZOFRAN ODT   Dose:  8 mg   Quantity:  60 tablet        Take 1 tablet (8 mg) by mouth 3 times daily (before meals)   Refills:  1        prochlorperazine 25 MG Suppository   Commonly known as:  COMPAZINE   Dose:  25 mg   Quantity:  20 suppository        Place 1 suppository (25 mg) rectally every 12 hours as needed for nausea   Refills:  1        promethazine 25 MG tablet   Commonly known as:  PHENERGAN   Dose:  25 mg   Quantity:  20 tablet        Take 1 tablet (25 mg) by mouth every 6 hours as needed for nausea   Refills:  0                Procedures and tests performed during your visit     EKG 12 lead    Glucose by meter    POC US OB TRANSABDOMINAL LIMITED      Orders Needing Specimen Collection     None      Pending Results     No orders found from 9/11/2018 to 9/14/2018.            Pending Culture Results     No orders found from 9/11/2018 to 9/14/2018.            Pending Results Instructions     If you had any lab results that were not finalized at the time of your Discharge, you can call the ED Lab Result RN at [phone]. You will be contacted by this team for any positive Lab results or changes in treatment. The nurses are available 7 days a week from 10A to 6:30P.  You can leave a message 24 hours per day and they will return your call.        Test Results From Your Hospital Stay        9/13/2018 12:56 PM      Component Results     Component Value Ref Range & Units Status    Glucose 95 70 - 99 mg/dL Final        "  2018  1:36 PM      Impression     [facility] Procedure Note     Limited Bedside ED Pelvic Ultrasound (PREGNANT PATIENT):    PROCEDURE: PERFORMED BY: Dr. Ashok Harry  INDICATIONS/SYMPTOM: Syncope  PROBE: Low frequency convex probe  Type of US Study: Transabdominal Exam  BODY LOCATION: Pelvis  FINDINGS: Fetal heart rate: Present and counted at 153 bpm.  INTERPRETATION: Normal pelvic ultrasound with intrauterine pregnancy present. FHR was 153 with noted fetal activity.  No pelvic free fluid was present. No adnexal abnormality noted.  IMAGE DOCUMENTATION: Images were archived to PACs system.                 Thank you for choosing [facility]       Thank you for choosing [facility] for your care. Our goal is always to provide you with excellent care. Hearing back from our patients is one way we can continue to improve our services. Please take a few minutes to complete the written survey that you may receive in the mail after you visit with us. Thank you!        [facility] [employer] Information     [employer] lets you send messages to your doctor, view your test results, renew your prescriptions, schedule appointments and more. To sign up, go to [email or web address] . Click on \"Log in\" on the left side of the screen, which will take you to the Welcome page. Then click on \"Sign up Now\" on the right side of the page.     You will be asked to enter the access code listed below, as well as some personal information. Please follow the directions to create your username and password.     Your access code is: [email or web address]  Expires: 2018  1:38 PM     Your access code will  in 90 days. If you need help or a new code, please call your [facility] clinic or [phone].        Care EveryWhere ID     This is your Care EveryWhere ID. This could be used by other organizations to access your [facility] medical records  [id number]        Equal Access to Services     MAYNOR YOON: gonzales Davila " orsalino kaur waxay idiin hayaan adeeg kharash la'aan ah. So [facility] [phone].    ATENCIÓN: Si habla arnaldo, tiene a donovan disposición servicios gratuitos de asistencia lingüística. Llame al [phone].    We comply with applicable federal civil rights laws and [place] laws. We do not discriminate on the basis of race, color, national origin, age, disability, sex, sexual orientation, or gender identity.            After Visit Summary       This is your record. Keep this with you and show to your community pharmacist(s) and doctor(s) at your next visit.

## 2018-09-13 NOTE — DISCHARGE INSTRUCTIONS
Return to the emergency department if you have abdominal pain, vaginal bleeding, repeated syncopal episodes, difficulty breathing, or other concerns.  Otherwise follow-up in clinic

## 2018-09-13 NOTE — ED NOTES
Patient was out on walk, started to feel lightheaded and dizzy, turned around to head back home. Passed out landing on right side, does not think she hit head. Ate waffles for breakfast, denies recent illness. Currently 24 weeks pregnant. Denies any  Pain at this time. Was diaphoretic upon waking up from ground. Un witness event. Called mother once back home.

## 2018-09-13 NOTE — NURSING NOTE
"Initial /71 (BP Location: Right arm, Patient Position: Sitting, Cuff Size: Adult Regular)  Pulse 88  Temp 98.4  F (36.9  C) (Tympanic)  Wt 136 lb 12.8 oz (62.1 kg)  LMP  (LMP Unknown)  BMI 25.02 kg/m2 Estimated body mass index is 25.02 kg/(m^2) as calculated from the following:    Height as of 8/16/18: 5' 2\" (1.575 m).    Weight as of this encounter: 136 lb 12.8 oz (62.1 kg). .    Darren ANDERSON CMA    "

## 2018-09-13 NOTE — MR AVS SNAPSHOT
"              After Visit Summary   2018    Stacie Daniels    MRN: [id number]           Patient Information     Date Of Birth          1990        Visit Information        Provider Department      2018 4:00 PM Brianna Mar MD [facility]        Today's Diagnoses     Prenatal care, subsequent pregnancy in second trimester    -  1       Follow-ups after your visit        Follow-up notes from your care team     Return in about 4 weeks (around 10/11/2018).      Who to contact     If you have questions or need follow up information about today's clinic visit or your schedule please contact [facility] directly at [phone].  Normal or non-critical lab and imaging results will be communicated to you by MyChart, letter or phone within 4 business days after the clinic has received the results. If you do not hear from us within 7 days, please contact the clinic through MyChart or phone. If you have a critical or abnormal lab result, we will notify you by phone as soon as possible.  Submit refill requests through "[employer] or call your pharmacy and they will forward the refill request to us. Please allow 3 business days for your refill to be completed.          Additional Information About Your Visit        MyChart Information     "[employer] lets you send messages to your doctor, view your test results, renew your prescriptions, schedule appointments and more. To sign up, go to [email or web address] . Click on \"Log in\" on the left side of the screen, which will take you to the Welcome page. Then click on \"Sign up Now\" on the right side of the page.     You will be asked to enter the access code listed below, as well as some personal information. Please follow the directions to create your username and password.     Your access code is: [email or web address]  Expires: 2018  1:38 PM     Your access code will  in 90 days. If you need help or a new code, please call your " [facility] or [phone].        Care EveryWhere ID     This is your Care EveryWhere ID. This could be used by other organizations to access your [place] medical records  [id number]        Your Vitals Were     Pulse Temperature Last Period BMI (Body Mass Index)          88 98.4  F (36.9  C) (Tympanic) (LMP Unknown) 25.02 kg/m2         Blood Pressure from Last 3 Encounters:   09/13/18 111/71   09/13/18 98/60   08/16/18 114/78    Weight from Last 3 Encounters:   09/13/18 136 lb 12.8 oz (62.1 kg)   08/16/18 133 lb 3.2 oz (60.4 kg)   07/16/18 120 lb (54.4 kg)              Today, you had the following     No orders found for display       Primary Care Provider Office Phone # Fax #    Kimberley Davis Jono APRSANDRA [facility] [phone] [address] [facility] [address]        Equal Access to Services     MAYNOR GOLDEN : Hadii aad ku hadasho Soomaali, waaxda luqadaha, qaybta kaalmada adeegyada, waxay idiin hayaan adeeg kharaisabela barksdale . So [facility] [phone].    ATENCIÓN: Si habla español, tiene a donovan disposición servicios gratuitos de asistencia lingüística. Llame al [phone].    We comply with applicable federal civil rights laws and [place] laws. We do not discriminate on the basis of race, color, national origin, age, disability, sex, sexual orientation, or gender identity.            Thank you!     Thank you for choosing [facility]  for your care. Our goal is always to provide you with excellent care. Hearing back from our patients is one way we can continue to improve our services. Please take a few minutes to complete the written survey that you may receive in the mail after your visit with us. Thank you!             Your Updated Medication List - Protect others around you: Learn how to safely use, store and throw away your medicines at www.disposemymeds.org.          This list is accurate as of 9/13/18  4:37 PM.  Always use your most recent med list.                   Brand  Name Dispense Instructions for use Diagnosis    hydrOXYzine 25 MG tablet    ATARAX    60 tablet    Take 1-2 tablets (25-50 mg) by mouth every 6 hours as needed for anxiety    Other insomnia       metoclopramide 5 MG tablet    REGLAN    240 tablet    Take 1-2 tablets (5-10 mg) by mouth every 6 hours as needed    Hyperemesis arising during pregnancy       multivitamin, therapeutic Tabs tablet      Take 1 tablet by mouth daily        ondansetron 8 MG ODT tab    ZOFRAN ODT    60 tablet    Take 1 tablet (8 mg) by mouth 3 times daily (before meals)    Nausea/vomiting in pregnancy       prochlorperazine 25 MG Suppository    COMPAZINE    20 suppository    Place 1 suppository (25 mg) rectally every 12 hours as needed for nausea    Hyperemesis arising during pregnancy       promethazine 25 MG tablet    PHENERGAN    20 tablet    Take 1 tablet (25 mg) by mouth every 6 hours as needed for nausea

## 2018-09-13 NOTE — PROGRESS NOTES
"Seen in the ED earlier today and cleared, after falling over outside \"blacking out.\"  Feeling much better now.   + active FM, absolutely zero contractions, no VB or LOF.  Tried the sertraline but threw up on it.  Still has a counselor she sees, feels like moods are stable.  Doing GCT today  Desires tubal ligation after delivery - is 100% sure.    28 year old  at 24w6d   - discussed hydration and warning signs for abruption, when to return for concerning s/s  - GCT today  - TDaP and flu shot next visit  - signed MA FTP today 18 and discussed r/b/a of bilateral salpingectomy - copied and record given to patient.    RTC 4 weeks    Brianna Mar MD, MPH  [facility]      "

## 2018-09-13 NOTE — ED AVS SNAPSHOT
[facility] Emergency Department    [address] [address]    Phone:  [phone]    Fax:  [phone]                                       Stacie Daniels   MRN: [id number]    Department:  [facility] Emergency Department   Date of Visit:  9/13/2018           After Visit Summary Signature Page     I have received my discharge instructions, and my questions have been answered. I have discussed any challenges I see with this plan with the nurse or doctor.    ..........................................................................................................................................  Patient/Patient Representative Signature      ..........................................................................................................................................  Patient Representative Print Name and Relationship to Patient    ..................................................               ................................................  Date                                   Time    ..........................................................................................................................................  Reviewed by Signature/Title    ...................................................              ..............................................  Date                                               Time          22EPIC Rev 08/18

## 2018-09-14 LAB — T PALLIDUM AB SER QL: NONREACTIVE

## 2018-09-14 NOTE — ED PROVIDER NOTES
"  History     Chief Complaint   Patient presents with     Loss of Consciousness     \"passed out while going for walk\"  felt sick before passing out, denies head, neck or back pain, thinks she landed on rt side, 24 weeks pregnant, no abd pain or vaginal bleeding     HPI  Stacie Daniels is a 28 year old female who is 24 weeks pregnant based on ultrasound and months menstrual period who presents after a syncopal episode.  The patient had a normal morning, went out for a walk, and while on her walk she started to feel lightheaded, became diaphoretic, developed tunnel vision, trying to hurry home, and then next found herself laying on the dirt road near her house.  She thinks she had a syncopal episode.  No incontinence.  No tongue biting.  She is not sure how long she was down for.  She is not sure how she fell but says she has some right shoulder pain and soreness, mild.  She has not taking medication for this.  She denies abdominal pain, vaginal bleeding, nausea, vomiting, headache.  She is still feeling baby kick like normal.    Problem List:    Patient Active Problem List    Diagnosis Date Noted     Prenatal care, subsequent pregnancy 05/23/2018     Priority: Medium     EFRAIN [place]       PTSD (post-traumatic stress disorder) 10/07/2013     Priority: Medium     Hyperemesis arising during pregnancy 05/01/2013     Priority: Medium     Has PICC line  Daily zofran and fluids       Sciatica 04/12/2013     Priority: Medium     Generalized anxiety disorder 03/28/2013     Priority: Medium     CARDIOVASCULAR SCREENING; LDL GOAL LESS THAN 160 02/08/2012     Priority: Medium     ADHD (attention deficit hyperactivity disorder) 01/25/2005     Priority: Medium     Off Concerta          Past Medical History:    Past Medical History:   Diagnosis Date     Anxiety      Chickenpox      Febrile convulsion (H)      Major depressive disorder, recurrent episode, mild (H)      Ulcer (H)        Past Surgical History:    Past Surgical " History:   Procedure Laterality Date     FOREIGN BODY REMOVAL      metal from her eye as a child       Family History:    Family History   Problem Relation Age of Onset     C.A.D. Father      Cancer Father      lymphoma     Prostate Cancer Father      Cancer Maternal Grandmother      rectal     Depression Maternal Grandmother      Cerebrovascular Disease Maternal Grandmother      Prostate Cancer Maternal Grandfather      Breast Cancer Paternal Grandmother      Hypertension Paternal Grandmother      Depression Paternal Grandmother      Thyroid Disease Paternal Grandmother      Hypertension Paternal Grandfather      Depression Mother      Bipolar Disorder Mother      Depression Sister      Bipolar Disorder Sister      Depression Sister      Schizophrenia Maternal Uncle      Suicide Paternal Aunt      Diabetes No family hx of      Glaucoma No family hx of      Macular Degeneration No family hx of        Social History:  Marital Status:  Single [1]  Social History   Substance Use Topics     Smoking status: Former Smoker     Smokeless tobacco: Never Used     Alcohol use No      Comment: rare-quit with pregnancy        Medications:      hydrOXYzine (ATARAX) 25 MG tablet   multivitamin, therapeutic (THERA-VIT) TABS tablet   metoclopramide (REGLAN) 5 MG tablet   ondansetron (ZOFRAN ODT) 8 MG ODT tab   prochlorperazine (COMPAZINE) 25 MG Suppository   promethazine (PHENERGAN) 25 MG tablet         Review of Systems  Pertinent positives and negatives listed in the HPI, all other systems reviewed and are negative.    Physical Exam   BP: 103/70  Pulse: 97  Heart Rate: 96  Temp: 97.8  F (36.6  C)  Resp: 11  SpO2: 99 %      Physical Exam   Constitutional: She is oriented to person, place, and time. She appears well-developed and well-nourished. She appears distressed.   HENT:   Head: Normocephalic and atraumatic.   Right Ear: External ear normal.   Left Ear: External ear normal.   Nose: Nose normal.   Eyes: Conjunctivae are normal.  No scleral icterus.   Neck: Normal range of motion.   Cardiovascular: Normal rate and regular rhythm.    Pulmonary/Chest: Effort normal. No stridor. No respiratory distress.   Abdominal: Soft. She exhibits no distension. There is no tenderness. There is no rebound and no guarding.   Neurological: She is alert and oriented to person, place, and time.   Skin: Skin is warm and dry. She is not diaphoretic.   Psychiatric: She has a normal mood and affect. Her behavior is normal.   Nursing note and vitals reviewed.      ED Course     ED Course     Procedures    Results for orders placed during the hospital encounter of 09/13/18   POC US OB TRANSABDOMINAL LIMITED    [facility] Procedure Note     Limited Bedside ED Pelvic Ultrasound (PREGNANT PATIENT):    PROCEDURE: PERFORMED BY: Dr. Ashok Harry  INDICATIONS/SYMPTOM: Syncope  PROBE: Low frequency convex probe  Type of US Study: Transabdominal Exam  BODY LOCATION: Pelvis  FINDINGS: Fetal heart rate: Present and counted at 153 bpm.  INTERPRETATION: Normal pelvic ultrasound with intrauterine pregnancy present. FHR was 153 with noted fetal activity.  No pelvic free fluid was present. No adnexal abnormality noted.  IMAGE DOCUMENTATION: Images were archived to PACs system.              EKG Interpretation:      Interpreted by Ashok Harry  Time reviewed: 1247   Symptoms at time of EKG: None   Rhythm: normal sinus   Rate: normal  Axis: NORMAL  Ectopy: none  Conduction: normal  ST Segments/ T Waves: No ST-T wave changes  Q Waves: none  Comparison to prior: No old EKG available    Clinical Impression: normal EKG      Critical Care time:  none               Results for orders placed or performed during the hospital encounter of 09/13/18 (from the past 24 hour(s))   Glucose by meter   Result Value Ref Range    Glucose 95 70 - 99 mg/dL   POC US OB TRANSABDOMINAL LIMITED    [facility] Procedure Note     Limited Bedside ED Pelvic  Ultrasound (PREGNANT PATIENT):    PROCEDURE: PERFORMED BY: Dr. Ashok Harry  INDICATIONS/SYMPTOM: Syncope  PROBE: Low frequency convex probe  Type of US Study: Transabdominal Exam  BODY LOCATION: Pelvis  FINDINGS: Fetal heart rate: Present and counted at 153 bpm.  INTERPRETATION: Normal pelvic ultrasound with intrauterine pregnancy present. FHR was 153 with noted fetal activity.  No pelvic free fluid was present. No adnexal abnormality noted.  IMAGE DOCUMENTATION: Images were archived to PACs system.        Medications - No data to display    Assessments & Plan (with Medical Decision Making)   28-year-old pregnant female presents for evaluation after a syncopal episode.  Temperature 36.6 C, heart rate 97, blood pressure 103/70, SPO2 is 99% on room air.  No chest pain or shortness of breath, unlikely pulmonary embolism given the lack of symptoms and no further workup is pursued for this at this time.  EKG sinus rhythm without signs of ischemia, dysrhythmia, WPW, Brugada syndrome, arrhythmogenic right ventricular dysplasia, or prolonged QT syndrome.  Bedside ultrasound shows good fetal movement, fetal heart rate 153, well-appearing.  The patient is feeling well now, angling without difficulty, and is safe to discharge with instructions to return if she has worsening symptoms or other concerns.  She does have follow-up in OB/Gy in clinic later today and is instructed to still continue with this.  The patient is in agreement with this plan.    I have reviewed the nursing notes.    I have reviewed the findings, diagnosis, plan and need for follow up with the patient.       Discharge Medication List as of 9/13/2018  1:38 PM          Final diagnoses:   Vasovagal syncope       9/13/2018   [facility] EMERGENCY DEPARTMENT     Ashok Harry MD  09/13/18 7114

## 2018-10-11 ENCOUNTER — PRENATAL OFFICE VISIT (OUTPATIENT)
Dept: OBGYN | Facility: CLINIC | Age: 28
End: 2018-10-11
Payer: COMMERCIAL

## 2018-10-11 VITALS
RESPIRATION RATE: 16 BRPM | HEART RATE: 103 BPM | WEIGHT: 152 LBS | TEMPERATURE: 97.7 F | SYSTOLIC BLOOD PRESSURE: 109 MMHG | DIASTOLIC BLOOD PRESSURE: 64 MMHG | HEIGHT: 62 IN | BODY MASS INDEX: 27.97 KG/M2

## 2018-10-11 DIAGNOSIS — Z34.83 PRENATAL CARE, SUBSEQUENT PREGNANCY IN THIRD TRIMESTER: ICD-10-CM

## 2018-10-11 DIAGNOSIS — Z23 NEED FOR PROPHYLACTIC VACCINATION AND INOCULATION AGAINST INFLUENZA: Primary | ICD-10-CM

## 2018-10-11 PROCEDURE — 90715 TDAP VACCINE 7 YRS/> IM: CPT | Performed by: OBSTETRICS & GYNECOLOGY

## 2018-10-11 PROCEDURE — 90472 IMMUNIZATION ADMIN EACH ADD: CPT | Performed by: OBSTETRICS & GYNECOLOGY

## 2018-10-11 PROCEDURE — 99207 ZZC PRENATAL VISIT: CPT | Performed by: OBSTETRICS & GYNECOLOGY

## 2018-10-11 PROCEDURE — 90686 IIV4 VACC NO PRSV 0.5 ML IM: CPT | Performed by: OBSTETRICS & GYNECOLOGY

## 2018-10-11 PROCEDURE — 90471 IMMUNIZATION ADMIN: CPT | Performed by: OBSTETRICS & GYNECOLOGY

## 2018-10-11 NOTE — NURSING NOTE
Screening Questionnaire for Adult Immunization    Are you sick today?   No   Do you have allergies to medications, food, a vaccine component or latex?   Yes avocado   Have you ever had a serious reaction after receiving a vaccination?   No   Do you have a long-term health problem with heart disease, lung disease, asthma, kidney disease, metabolic disease (e.g. diabetes), anemia, or other blood disorder?   No   Do you have cancer, leukemia, HIV/AIDS, or any other immune system problem?   No   In the past 3 months, have you taken medications that affect  your immune system, such as prednisone, other steroids, or anticancer drugs; drugs for the treatment of rheumatoid arthritis, Crohn s disease, or psoriasis; or have you had radiation treatments?   No   Have you had a seizure, or a brain or other nervous system problem?   No   During the past year, have you received a transfusion of blood or blood     products, or been given immune (gamma) globulin or antiviral drug?   No   For women: Are you pregnant or is there a chance you could become        pregnant during the next month?   No   Have you received any vaccinations in the past 4 weeks?   No            Per orders of Dr. Smallwood, injection of TDAP given by Dottie Major. Patient instructed to remain in clinic for 15 minutes afterwards, and to report any adverse reaction to me immediately.       Screening performed by Dottie Major on 10/11/2018 at 11:29 AM.

## 2018-10-11 NOTE — MR AVS SNAPSHOT
After Visit Summary   10/11/2018    Stacie Daniels    MRN: [id number]           Patient Information     Date Of Birth          1990        Visit Information        Provider Department      10/11/2018 11:30 AM Helen Smallwood MD [facility]        Today's Diagnoses     Need for prophylactic vaccination and inoculation against influenza    -  1    Prenatal care, subsequent pregnancy in third trimester           Follow-ups after your visit        Your next 10 appointments already scheduled     Nov 02, 2018 11:30 AM CDT   [facility]ABL[facility] Tanya Huff [facility]   [facility]    [address] [address]   [phone]            Nov 15, 2018  3:45 PM CST   ESTABLISHED PRENATAL with Helen Smallwood MD   [facility]    [address] [address]   [phone]            Nov 29, 2018  1:00 PM CST   ESTABLISHED PRENATAL with Helen Smallwood MD   [facility]    [address] [address]   [phone]              Who to contact     If you have questions or need follow up information about today's clinic visit or your schedule please contact [facility] directly at [phone].  Normal or non-critical lab and imaging results will be communicated to you by MyChart, letter or phone within 4 business days after the clinic has received the results. If you do not hear from us within 7 days, please contact the clinic through MyChart or phone. If you have a critical or abnormal lab result, we will notify you by phone as soon as possible.  Submit refill requests through [employer] or call your pharmacy and they will forward the refill request to us. Please allow 3 business days for your refill to be completed.          Additional Information About Your Visit        MyChart Information      "[employer] lets you send messages to your doctor, view your test results, renew your prescriptions, schedule appointments and more. To sign up, go to [email or web address] . Click on \"Log in\" on the left side of the screen, which will take you to the Welcome page. Then click on \"Sign up Now\" on the right side of the page.     You will be asked to enter the access code listed below, as well as some personal information. Please follow the directions to create your username and password.     Your access code is: [email or web address]  Expires: 2018  1:38 PM     Your access code will  in 90 days. If you need help or a new code, please call your [place] clinic or [phone].        Care EveryWhere ID     This is your Care EveryWhere ID. This could be used by other organizations to access your [place] medical records  [id number]        Your Vitals Were     Pulse Temperature Respirations Height Last Period Breastfeeding?    103 97.7  F (36.5  C) (Tympanic) 16 5' 2\" (1.575 m) (LMP Unknown) No    BMI (Body Mass Index)                   27.8 kg/m2            Blood Pressure from Last 3 Encounters:   10/11/18 109/64   18 111/71   18 98/60    Weight from Last 3 Encounters:   10/11/18 152 lb (68.9 kg)   18 136 lb 12.8 oz (62.1 kg)   18 133 lb 3.2 oz (60.4 kg)              We Performed the Following     ADMIN 1st VACCINE     FLU VACCINE, SPLIT VIRUS, IM (QUADRIVALENT) [93688]- >3 YRS     TDAP, IM (10 - 64 YRS) - Adacel     Vaccine Administration, Each Additional [62411]        Primary Care Provider Office Phone # Fax #    STEPHANIE Suarez [facility] [phone] [address]  [facility] [address]        Equal Access to Services     MAYNOR GOLDEN : Eitan Interiano, wamalou kaur, margaux skinner. So [facility] [phone].    ATENCIÓN: Si habla español, tiene a donovan disposición servicios gratuitos de asistencia " lingüística. Simone al [phone].    We comply with applicable federal civil rights laws and [place] laws. We do not discriminate on the basis of race, color, national origin, age, disability, sex, sexual orientation, or gender identity.            Thank you!     Thank you for choosing [facility]  for your care. Our goal is always to provide you with excellent care. Hearing back from our patients is one way we can continue to improve our services. Please take a few minutes to complete the written survey that you may receive in the mail after your visit with us. Thank you!             Your Updated Medication List - Protect others around you: Learn how to safely use, store and throw away your medicines at www.disposemymeds.org.          This list is accurate as of 10/11/18 11:49 AM.  Always use your most recent med list.                   Brand Name Dispense Instructions for use Diagnosis    hydrOXYzine 25 MG tablet    ATARAX    60 tablet    Take 1-2 tablets (25-50 mg) by mouth every 6 hours as needed for anxiety    Other insomnia       metoclopramide 5 MG tablet    REGLAN    240 tablet    Take 1-2 tablets (5-10 mg) by mouth every 6 hours as needed    Hyperemesis arising during pregnancy       multivitamin, therapeutic Tabs tablet      Take 1 tablet by mouth daily        ondansetron 8 MG ODT tab    ZOFRAN ODT    60 tablet    Take 1 tablet (8 mg) by mouth 3 times daily (before meals)    Nausea/vomiting in pregnancy       prochlorperazine 25 MG Suppository    COMPAZINE    20 suppository    Place 1 suppository (25 mg) rectally every 12 hours as needed for nausea    Hyperemesis arising during pregnancy       promethazine 25 MG tablet    PHENERGAN    20 tablet    Take 1 tablet (25 mg) by mouth every 6 hours as needed for nausea

## 2018-10-11 NOTE — NURSING NOTE
"Initial /64 (BP Location: Left arm, Patient Position: Chair, Cuff Size: Adult Regular)  Pulse 103  Temp 97.7  F (36.5  C) (Tympanic)  Resp 16  Ht 5' 2\" (1.575 m)  Wt 152 lb (68.9 kg)  LMP  (LMP Unknown)  Breastfeeding? No  BMI 27.8 kg/m2 Estimated body mass index is 27.8 kg/(m^2) as calculated from the following:    Height as of this encounter: 5' 2\" (1.575 m).    Weight as of this encounter: 152 lb (68.9 kg). .    Dottie Major, AGNIESZKA    "

## 2018-10-11 NOTE — PROGRESS NOTES

## 2018-11-02 ENCOUNTER — PRENATAL OFFICE VISIT (OUTPATIENT)
Dept: OBGYN | Facility: CLINIC | Age: 28
End: 2018-11-02
Payer: COMMERCIAL

## 2018-11-02 VITALS
HEART RATE: 106 BPM | DIASTOLIC BLOOD PRESSURE: 72 MMHG | WEIGHT: 161.2 LBS | BODY MASS INDEX: 29.66 KG/M2 | TEMPERATURE: 98.5 F | SYSTOLIC BLOOD PRESSURE: 109 MMHG | HEIGHT: 62 IN | RESPIRATION RATE: 16 BRPM

## 2018-11-02 DIAGNOSIS — Z34.83 PRENATAL CARE, SUBSEQUENT PREGNANCY IN THIRD TRIMESTER: Primary | ICD-10-CM

## 2018-11-02 PROCEDURE — 99207 ZZC PRENATAL VISIT: CPT | Performed by: OBSTETRICS & GYNECOLOGY

## 2018-11-02 NOTE — PROGRESS NOTES
"CC: Here for routine prenatal visit @ 32w0d   HPI:  Continues to express desires for PPTL; feeling well; having some BHC; reviewed s/s of labor    PE: /72  Pulse 106  Temp 98.5  F (36.9  C)  Resp 16  Ht 5' 2\" (1.575 m)  Wt 161 lb 3.2 oz (73.1 kg)  LMP  (LMP Unknown)  BMI 29.48 kg/m2   See OB flowsheet      A:  1. Prenatal care, subsequent pregnancy in third trimester        Routine prenatal care  RTC 2 weeks.      Tanya Huff M.D.     "

## 2018-11-02 NOTE — MR AVS SNAPSHOT
"              After Visit Summary   11/2/2018    Stacie Daniels    MRN: [id number]           Patient Information     Date Of Birth          1990        Visit Information        Provider Department      11/2/2018 11:30 AM Tanya uHff MD [facility]        Today's Diagnoses     Prenatal care, subsequent pregnancy in third trimester    -  1       Follow-ups after your visit        Your next 10 appointments already scheduled     Nov 15, 2018  3:45 PM CST   ESTABLISHED PRENATAL with Helen Smallwood MD   [facility]    [address] [address]   [phone]            Nov 29, 2018  1:00 PM CST   ESTABLISHED PRENATAL with Helen Smallwood MD   [facility]    [address] [address]   [phone]              Who to contact     If you have questions or need follow up information about today's clinic visit or your schedule please contact [facility] directly at [phone].  Normal or non-critical lab and imaging results will be communicated to you by MyChart, letter or phone within 4 business days after the clinic has received the results. If you do not hear from us within 7 days, please contact the clinic through MyChart or phone. If you have a critical or abnormal lab result, we will notify you by phone as soon as possible.  Submit refill requests through [employer] or call your pharmacy and they will forward the refill request to us. Please allow 3 business days for your refill to be completed.          Additional Information About Your Visit        Care EveryWhere ID     This is your Care EveryWhere ID. This could be used by other organizations to access your [place] medical records  [id number]        Your Vitals Were     Pulse Temperature Respirations Height Last Period BMI (Body Mass Index)    106 98.5  F (36.9  C) 16 5' 2\" (1.575 m) (LMP " Unknown) 29.48 kg/m2       Blood Pressure from Last 3 Encounters:   11/02/18 109/72   10/11/18 109/64   09/13/18 111/71    Weight from Last 3 Encounters:   11/02/18 161 lb 3.2 oz (73.1 kg)   10/11/18 152 lb (68.9 kg)   09/13/18 136 lb 12.8 oz (62.1 kg)              Today, you had the following     No orders found for display       Primary Care Provider Office Phone # Fax #    Kimberley Davis Jono, STEPHANIE [facility] [phone] [address] [facility] [address]        Equal Access to Services     [facility] [place] : Hadii miguel remyo [place], waaxda luqadaha, qaybta kaalmada jazmyneda, margaux [place] . So [facility] [phone].    ATENCIÓN: Si habla español, tiene a donovan disposición servicios gratuitos de asistencia lingüística. [facility] [phone].    We comply with applicable federal civil rights laws and [place] laws. We do not discriminate on the basis of race, color, national origin, age, disability, sex, sexual orientation, or gender identity.            Thank you!     Thank you for choosing [facility]  for your care. Our goal is always to provide you with excellent care. Hearing back from our patients is one way we can continue to improve our services. Please take a few minutes to complete the written survey that you may receive in the mail after your visit with us. Thank you!             Your Updated Medication List - Protect others around you: Learn how to safely use, store and throw away your medicines at www.disposemymeds.org.          This list is accurate as of 11/2/18 11:41 AM.  Always use your most recent med list.                   Brand Name Dispense Instructions for use Diagnosis    hydrOXYzine 25 MG tablet    ATARAX    60 tablet    Take 1-2 tablets (25-50 mg) by mouth every 6 hours as needed for anxiety    Other insomnia       metoclopramide 5 MG tablet    REGLAN    240 tablet    Take 1-2 tablets (5-10 mg) by mouth every 6 hours as needed     Hyperemesis arising during pregnancy       multivitamin, therapeutic Tabs tablet      Take 1 tablet by mouth daily        ondansetron 8 MG ODT tab    ZOFRAN ODT    60 tablet    Take 1 tablet (8 mg) by mouth 3 times daily (before meals)    Nausea/vomiting in pregnancy       prochlorperazine 25 MG Suppository    COMPAZINE    20 suppository    Place 1 suppository (25 mg) rectally every 12 hours as needed for nausea    Hyperemesis arising during pregnancy       promethazine 25 MG tablet    PHENERGAN    20 tablet    Take 1 tablet (25 mg) by mouth every 6 hours as needed for nausea

## 2018-11-13 DIAGNOSIS — G47.09 OTHER INSOMNIA: ICD-10-CM

## 2018-11-13 RX ORDER — HYDROXYZINE HYDROCHLORIDE 25 MG/1
25-50 TABLET, FILM COATED ORAL EVERY 6 HOURS PRN
Qty: 60 TABLET | Refills: 3 | Status: ON HOLD | OUTPATIENT
Start: 2018-11-13 | End: 2018-12-22

## 2018-11-15 ENCOUNTER — PRENATAL OFFICE VISIT (OUTPATIENT)
Dept: OBGYN | Facility: CLINIC | Age: 28
End: 2018-11-15
Payer: COMMERCIAL

## 2018-11-15 VITALS
TEMPERATURE: 98 F | BODY MASS INDEX: 30.25 KG/M2 | WEIGHT: 164.4 LBS | HEIGHT: 62 IN | SYSTOLIC BLOOD PRESSURE: 112 MMHG | RESPIRATION RATE: 18 BRPM | HEART RATE: 100 BPM | DIASTOLIC BLOOD PRESSURE: 75 MMHG

## 2018-11-15 DIAGNOSIS — Z34.83 PRENATAL CARE, SUBSEQUENT PREGNANCY IN THIRD TRIMESTER: Primary | ICD-10-CM

## 2018-11-15 PROCEDURE — 99207 ZZC PRENATAL VISIT: CPT | Performed by: OBSTETRICS & GYNECOLOGY

## 2018-11-15 NOTE — PROGRESS NOTES
"CC: Here for routine prenatal visit @ 33w6d   HPI: + FM, no ctx, no LOF, no VB.  Feels pressure at times at the vaginal opening and wondering if she has prolapse    PE: /75 (BP Location: Left arm, Patient Position: Chair, Cuff Size: Adult Regular)  Pulse 100  Temp 98  F (36.7  C) (Tympanic)  Resp 18  Ht 5' 2\" (1.575 m)  Wt 164 lb 6.4 oz (74.6 kg)  LMP  (LMP Unknown)  Breastfeeding? No  BMI 30.07 kg/m2   See OB flowsheet    NEFG with 1st degree rectocele visible    A/P  @ 33w6d normal pregnancy    1. Routine prenatal care.  Patient reassured regarding vaginal findings.  Will readdress when patient is postpartum and well healed.  Encouraged good bowel regimen and avoidance of constipation.     RTC 1-2 weeks.      Helen Smallwood M.D.    "

## 2018-11-15 NOTE — MR AVS SNAPSHOT
After Visit Summary   11/15/2018    Stacie Daniels    MRN: [id number]           Patient Information     Date Of Birth          1990        Visit Information        Provider Department      11/15/2018 3:45 PM Helen Smallwood MD [facility]        Today's Diagnoses     Prenatal care, subsequent pregnancy in third trimester    -  1       Follow-ups after your visit        Your next 10 appointments already scheduled     Nov 29, 2018  1:00 PM CST   ESTABLISHED PRENATAL with Helen Smallwood MD   [facility]    [address] [address]   [phone]            Dec 06, 2018 11:30 AM CST   ESTABLISHED PRENATAL with Helen Smallwood MD   [facility]    [address] [address]   [phone]            Dec 13, 2018 10:00 AM CST   ESTABLISHED PRENATAL with Helen Smallwood MD   [facility]    [address] [address]   [phone]            Dec 20, 2018  9:00 AM CST   ESTABLISHED PRENATAL with Helen Smallwood MD   [facility]    [address] [address]   [phone]            Dec 26, 2018 11:00 AM CST   ESTABLISHED PRENATAL with Brianna Mar MD   [facility]    [address] [address]   [phone]              Who to contact     If you have questions or need follow up information about today's clinic visit or your schedule please contact [facility] directly at [phone].  Normal or non-critical lab and imaging results will be communicated to you by MyChart, letter or phone within 4 business days after the clinic has received the results. If you do not hear from us within 7 days, please contact the clinic through MyChart or phone. If you have a  "critical or abnormal lab result, we will notify you by phone as soon as possible.  Submit refill requests through [employer] or call your pharmacy and they will forward the refill request to us. Please allow 3 business days for your refill to be completed.          Additional Information About Your Visit        Care EveryWhere ID     This is your Care EveryWhere ID. This could be used by other organizations to access your [place] medical records  [id number]        Your Vitals Were     Pulse Temperature Respirations Height Last Period Breastfeeding?    100 98  F (36.7  C) (Tympanic) 18 5' 2\" (1.575 m) (LMP Unknown) No    BMI (Body Mass Index)                   30.07 kg/m2            Blood Pressure from Last 3 Encounters:   11/15/18 112/75   11/02/18 109/72   10/11/18 109/64    Weight from Last 3 Encounters:   11/15/18 164 lb 6.4 oz (74.6 kg)   11/02/18 161 lb 3.2 oz (73.1 kg)   10/11/18 152 lb (68.9 kg)              Today, you had the following     No orders found for display       Primary Care Provider Office Phone # Fax #    Kimberley STEPHANIE Weeks [facility] [phone] [address] [facility] [address]        Equal Access to Services     MAYNOR GOLDEN : Hadii aad ku hadasho Soomaali, waaxda luqadaha, qaybta kaalmada adeegyada, waxay idiin hayruizn lasha colon lamatthew . So [facility] [phone].    ATENCIÓN: Si habla español, tiene a donovan disposición servicios gratuitos de asistencia lingüística. Llame al [phone].    We comply with applicable federal civil rights laws and [place] laws. We do not discriminate on the basis of race, color, national origin, age, disability, sex, sexual orientation, or gender identity.            Thank you!     Thank you for choosing [facility]  for your care. Our goal is always to provide you with excellent care. Hearing back from our patients is one way we can continue to improve our services. Please take a few minutes to complete the written survey " that you may receive in the mail after your visit with us. Thank you!             Your Updated Medication List - Protect others around you: Learn how to safely use, store and throw away your medicines at www.disposemymeds.org.          This list is accurate as of 11/15/18  4:01 PM.  Always use your most recent med list.                   Brand Name Dispense Instructions for use Diagnosis    hydrOXYzine 25 MG tablet    ATARAX    60 tablet    Take 1-2 tablets (25-50 mg) by mouth every 6 hours as needed for anxiety    Other insomnia       metoclopramide 5 MG tablet    REGLAN    240 tablet    Take 1-2 tablets (5-10 mg) by mouth every 6 hours as needed    Hyperemesis arising during pregnancy       multivitamin, therapeutic Tabs tablet      Take 1 tablet by mouth daily        ondansetron 8 MG ODT tab    ZOFRAN ODT    60 tablet    Take 1 tablet (8 mg) by mouth 3 times daily (before meals)    Nausea/vomiting in pregnancy       prochlorperazine 25 MG Suppository    COMPAZINE    20 suppository    Place 1 suppository (25 mg) rectally every 12 hours as needed for nausea    Hyperemesis arising during pregnancy       promethazine 25 MG tablet    PHENERGAN    20 tablet    Take 1 tablet (25 mg) by mouth every 6 hours as needed for nausea

## 2018-11-29 ENCOUNTER — PRENATAL OFFICE VISIT (OUTPATIENT)
Dept: OBGYN | Facility: CLINIC | Age: 28
End: 2018-11-29
Payer: COMMERCIAL

## 2018-11-29 VITALS
WEIGHT: 169 LBS | DIASTOLIC BLOOD PRESSURE: 74 MMHG | BODY MASS INDEX: 31.1 KG/M2 | SYSTOLIC BLOOD PRESSURE: 113 MMHG | HEART RATE: 94 BPM | HEIGHT: 62 IN | TEMPERATURE: 97.6 F | RESPIRATION RATE: 16 BRPM

## 2018-11-29 DIAGNOSIS — Z34.83 PRENATAL CARE, SUBSEQUENT PREGNANCY IN THIRD TRIMESTER: Primary | ICD-10-CM

## 2018-11-29 PROCEDURE — 87653 STREP B DNA AMP PROBE: CPT | Performed by: OBSTETRICS & GYNECOLOGY

## 2018-11-29 PROCEDURE — 99207 ZZC PRENATAL VISIT: CPT | Performed by: OBSTETRICS & GYNECOLOGY

## 2018-11-29 NOTE — NURSING NOTE
"Initial /74 (BP Location: Right arm, Patient Position: Chair, Cuff Size: Adult Regular)  Pulse 94  Temp 97.6  F (36.4  C) (Tympanic)  Resp 16  Ht 5' 2\" (1.575 m)  Wt 169 lb (76.7 kg)  LMP  (LMP Unknown)  Breastfeeding? No  BMI 30.91 kg/m2 Estimated body mass index is 30.91 kg/(m^2) as calculated from the following:    Height as of this encounter: 5' 2\" (1.575 m).    Weight as of this encounter: 169 lb (76.7 kg). .    Dottie Major, AGNIESZKA    "

## 2018-11-29 NOTE — PROGRESS NOTES
"CC: Here for routine prenatal visit @ 35w6d   HPI: + FM, no ctx, no LOF, no VB.  No complaints.     PE: /74 (BP Location: Right arm, Patient Position: Chair, Cuff Size: Adult Regular)  Pulse 94  Temp 97.6  F (36.4  C) (Tympanic)  Resp 16  Ht 5' 2\" (1.575 m)  Wt 169 lb (76.7 kg)  LMP  (LMP Unknown)  Breastfeeding? No  BMI 30.91 kg/m2   See OB flowsheet    GBS today  Cervix posterior    A/P  @ 35w6d normal pregnancy    1. Routine prenatal care.  Labor precautions reviewed.  Discussed with Stacie Daniels, the following; indications; the agents and methods of labor augmentation, including risks, benefits, and alternative approaches; and the possible need for  birth. EFW is AGA.    The Labor Induction:what you need to know information sheet was made available to her. Questions and concerns were addressed and patient agrees to above if necessary during the course of her labor.    RTC 1 week    Helen Smallwood M.D.    "

## 2018-11-29 NOTE — MR AVS SNAPSHOT
After Visit Summary   11/29/2018    Stacie Daniels    MRN: [id number]           Patient Information     Date Of Birth          1990        Visit Information        Provider Department      11/29/2018 1:00 PM Helen Smallwood MD [facility]        Today's Diagnoses     Prenatal care, subsequent pregnancy in third trimester    -  1       Follow-ups after your visit        Your next 10 appointments already scheduled     Dec 06, 2018 11:30 AM CST   ESTABLISHED PRENATAL with Helen Smallwood MD   [facility]    [address] [address]   [phone]            Dec 13, 2018 10:00 AM CST   ESTABLISHED PRENATAL with Helen Smallwood MD   [facility]    [address] [address]   [phone]            Dec 20, 2018  9:00 AM CST   ESTABLISHED PRENATAL with Helen Smallwood MD   [facility]    [address] [address]   [phone]            Dec 26, 2018 11:00 AM CST   ESTABLISHED PRENATAL with Brianna Mar MD   [facility]    [address] [address]   [phone]              Who to contact     If you have questions or need follow up information about today's clinic visit or your schedule please contact [facility] directly at [phone].  Normal or non-critical lab and imaging results will be communicated to you by MyChart, letter or phone within 4 business days after the clinic has received the results. If you do not hear from us within 7 days, please contact the clinic through [employer] or phone. If you have a critical or abnormal lab result, we will notify you by phone as soon as possible.  Submit refill requests through [employer] or call your pharmacy and they will forward the refill request to us. Please allow 3  "business days for your refill to be completed.          Additional Information About Your Visit        Care EveryWhere ID     This is your Care EveryWhere ID. This could be used by other organizations to access your [place] medical records  [id number]        Your Vitals Were     Pulse Temperature Respirations Height Last Period Breastfeeding?    94 97.6  F (36.4  C) (Tympanic) 16 5' 2\" (1.575 m) (LMP Unknown) No    BMI (Body Mass Index)                   30.91 kg/m2            Blood Pressure from Last 3 Encounters:   11/29/18 113/74   11/15/18 112/75   11/02/18 109/72    Weight from Last 3 Encounters:   11/29/18 169 lb (76.7 kg)   11/15/18 164 lb 6.4 oz (74.6 kg)   11/02/18 161 lb 3.2 oz (73.1 kg)              We Performed the Following     Group B strep PCR        Primary Care Provider Office Phone # Fax #    Kimberley STEPHANIE Weeks [facility] [phone] [address] [address]        Equal Access to Services     [facility]: Hadii aad ku hadasho Soomaali, waaxda luqadaha, qaybta kaalmada adeegyada, margaux [place] . So [facility] [phone].    ATENCIÓN: Si habla español, tiene a donovan disposición servicios gratuitos de asistencia lingüística. Llame al [phone].    We comply with applicable federal civil rights laws and [place] laws. We do not discriminate on the basis of race, color, national origin, age, disability, sex, sexual orientation, or gender identity.            Thank you!     Thank you for choosing [facility]  for your care. Our goal is always to provide you with excellent care. Hearing back from our patients is one way we can continue to improve our services. Please take a few minutes to complete the written survey that you may receive in the mail after your visit with us. Thank you!             Your Updated Medication List - Protect others around you: Learn how to safely use, store and throw away your medicines at " www.disposemymeds.org.          This list is accurate as of 11/29/18  1:22 PM.  Always use your most recent med list.                   Brand Name Dispense Instructions for use Diagnosis    hydrOXYzine 25 MG tablet    ATARAX    60 tablet    Take 1-2 tablets (25-50 mg) by mouth every 6 hours as needed for anxiety    Other insomnia       metoclopramide 5 MG tablet    REGLAN    240 tablet    Take 1-2 tablets (5-10 mg) by mouth every 6 hours as needed    Hyperemesis arising during pregnancy       multivitamin, therapeutic Tabs tablet      Take 1 tablet by mouth daily        ondansetron 8 MG ODT tab    ZOFRAN ODT    60 tablet    Take 1 tablet (8 mg) by mouth 3 times daily (before meals)    Nausea/vomiting in pregnancy       prochlorperazine 25 MG Suppository    COMPAZINE    20 suppository    Place 1 suppository (25 mg) rectally every 12 hours as needed for nausea    Hyperemesis arising during pregnancy       promethazine 25 MG tablet    PHENERGAN    20 tablet    Take 1 tablet (25 mg) by mouth every 6 hours as needed for nausea

## 2018-11-30 LAB
GP B STREP DNA SPEC QL NAA+PROBE: NEGATIVE
SPECIMEN SOURCE: NORMAL

## 2018-12-06 ENCOUNTER — PRENATAL OFFICE VISIT (OUTPATIENT)
Dept: OBGYN | Facility: CLINIC | Age: 28
End: 2018-12-06
Payer: COMMERCIAL

## 2018-12-06 VITALS
WEIGHT: 173.4 LBS | RESPIRATION RATE: 16 BRPM | SYSTOLIC BLOOD PRESSURE: 125 MMHG | DIASTOLIC BLOOD PRESSURE: 69 MMHG | HEIGHT: 62 IN | HEART RATE: 116 BPM | TEMPERATURE: 96.7 F | BODY MASS INDEX: 31.91 KG/M2

## 2018-12-06 DIAGNOSIS — Z34.83 PRENATAL CARE, SUBSEQUENT PREGNANCY IN THIRD TRIMESTER: Primary | ICD-10-CM

## 2018-12-06 PROCEDURE — 99207 ZZC PRENATAL VISIT: CPT | Performed by: OBSTETRICS & GYNECOLOGY

## 2018-12-06 NOTE — MR AVS SNAPSHOT
After Visit Summary   12/6/2018    Stacie Daniels    MRN: [id number]           Patient Information     Date Of Birth          1990        Visit Information        Provider Department      12/6/2018 11:30 AM Helen Smallwood MD [facility]        Today's Diagnoses     Prenatal care, subsequent pregnancy in third trimester    -  1       Follow-ups after your visit        Your next 10 appointments already scheduled     Dec 13, 2018 10:00 AM CST   ESTABLISHED PRENATAL with Helen Smallwood MD   [facility]    [address] [address]   [phone]            Dec 20, 2018  9:00 AM CST   ESTABLISHED PRENATAL with Helen Smallwood MD   [facility]    [address] [address]   [phone]            Dec 26, 2018 11:00 AM CST   ESTABLISHED PRENATAL with Brianna Mar MD   [facility]    [address] [address]   [phone]              Who to contact     If you have questions or need follow up information about today's clinic visit or your schedule please contact [facility] directly at [phone].  Normal or non-critical lab and imaging results will be communicated to you by MyChart, letter or phone within 4 business days after the clinic has received the results. If you do not hear from us within 7 days, please contact the clinic through MyChart or phone. If you have a critical or abnormal lab result, we will notify you by phone as soon as possible.  Submit refill requests through clipkit or call your pharmacy and they will forward the refill request to us. Please allow 3 business days for your refill to be completed.          Additional Information About Your Visit        Care EveryWhere ID     This is your Care EveryWhere ID. This could be used by other organizations to  "access your [place] medical records  [id number]        Your Vitals Were     Pulse Temperature Respirations Height Last Period Breastfeeding?    116 96.7  F (35.9  C) (Tympanic) 16 5' 2\" (1.575 m) (LMP Unknown) No    BMI (Body Mass Index)                   31.72 kg/m2            Blood Pressure from Last 3 Encounters:   12/06/18 125/69   11/29/18 113/74   11/15/18 112/75    Weight from Last 3 Encounters:   12/06/18 173 lb 6.4 oz (78.7 kg)   11/29/18 169 lb (76.7 kg)   11/15/18 164 lb 6.4 oz (74.6 kg)              Today, you had the following     No orders found for display       Primary Care Provider Office Phone # Fax #    Kimberley Davis Cuongjuan r APRSANDRA [facility] [phone] [address] [address] [address]        Equal Access to Services     FRANNIE GOLDEN : Hadii aad ku hadasho Soomaali, waaxda luqadaha, qaybta kaalmada adeegyada, waxay marcusin hayruizn lasha [place] . So [facility] [phone].    ATENCIÓN: Si habla español, tiene a donovan disposición servicios gratuitos de asistencia lingüística. Llame al [phone].    We comply with applicable federal civil rights laws and [place] laws. We do not discriminate on the basis of race, color, national origin, age, disability, sex, sexual orientation, or gender identity.            Thank you!     Thank you for choosing [facility]  for your care. Our goal is always to provide you with excellent care. Hearing back from our patients is one way we can continue to improve our services. Please take a few minutes to complete the written survey that you may receive in the mail after your visit with us. Thank you!             Your Updated Medication List - Protect others around you: Learn how to safely use, store and throw away your medicines at www.disposemymeds.org.          This list is accurate as of 12/6/18 11:36 AM.  Always use your most recent med list.                   Brand Name Dispense Instructions for use Diagnosis    hydrOXYzine 25 MG " tablet    ATARAX    60 tablet    Take 1-2 tablets (25-50 mg) by mouth every 6 hours as needed for anxiety    Other insomnia       metoclopramide 5 MG tablet    REGLAN    240 tablet    Take 1-2 tablets (5-10 mg) by mouth every 6 hours as needed    Hyperemesis arising during pregnancy       multivitamin, therapeutic Tabs tablet      Take 1 tablet by mouth daily        ondansetron 8 MG ODT tab    ZOFRAN ODT    60 tablet    Take 1 tablet (8 mg) by mouth 3 times daily (before meals)    Nausea/vomiting in pregnancy       prochlorperazine 25 MG suppository    COMPAZINE    20 suppository    Place 1 suppository (25 mg) rectally every 12 hours as needed for nausea    Hyperemesis arising during pregnancy       promethazine 25 MG tablet    PHENERGAN    20 tablet    Take 1 tablet (25 mg) by mouth every 6 hours as needed for nausea        STOOL SOFTENER PO           ZANTAC PO

## 2018-12-06 NOTE — PROGRESS NOTES
"CC: Here for routine prenatal visit @ 36w6d   HPI: + FM, no ctx, no LOF, no VB.  No complaints.     PE: /69 (BP Location: Right arm, Patient Position: Chair, Cuff Size: Adult Regular)  Pulse 116  Temp 96.7  F (35.9  C) (Tympanic)  Resp 16  Ht 5' 2\" (1.575 m)  Wt 173 lb 6.4 oz (78.7 kg)  LMP  (LMP Unknown)  Breastfeeding? No  BMI 31.72 kg/m2   See OB flowsheet    Cervix still very posterior    A/P  @ 36w6d normal pregnancy    1. Routine prenatal care    RTC 1 week    Helen Smallwood M.D.    "

## 2018-12-06 NOTE — NURSING NOTE
"Initial /69 (BP Location: Right arm, Patient Position: Chair, Cuff Size: Adult Regular)  Pulse 116  Temp 96.7  F (35.9  C) (Tympanic)  Resp 16  Ht 5' 2\" (1.575 m)  Wt 173 lb 6.4 oz (78.7 kg)  LMP  (LMP Unknown)  Breastfeeding? No  BMI 31.72 kg/m2 Estimated body mass index is 31.72 kg/(m^2) as calculated from the following:    Height as of this encounter: 5' 2\" (1.575 m).    Weight as of this encounter: 173 lb 6.4 oz (78.7 kg). .    Dottie Major CMA    "

## 2018-12-13 ENCOUNTER — PRENATAL OFFICE VISIT (OUTPATIENT)
Dept: OBGYN | Facility: CLINIC | Age: 28
End: 2018-12-13
Payer: COMMERCIAL

## 2018-12-13 VITALS
HEART RATE: 107 BPM | TEMPERATURE: 97.6 F | DIASTOLIC BLOOD PRESSURE: 76 MMHG | WEIGHT: 180 LBS | RESPIRATION RATE: 18 BRPM | HEIGHT: 62 IN | BODY MASS INDEX: 33.13 KG/M2 | SYSTOLIC BLOOD PRESSURE: 117 MMHG

## 2018-12-13 DIAGNOSIS — Z34.83 ENCOUNTER FOR SUPERVISION OF OTHER NORMAL PREGNANCY IN THIRD TRIMESTER: Primary | ICD-10-CM

## 2018-12-13 PROCEDURE — 99207 ZZC PRENATAL VISIT: CPT | Performed by: OBSTETRICS & GYNECOLOGY

## 2018-12-13 ASSESSMENT — MIFFLIN-ST. JEOR: SCORE: 1504.72

## 2018-12-13 NOTE — PROGRESS NOTES
"CC: Here for routine prenatal visit @ 37w6d   HPI: + FM, no ctx, no LOF, no VB.  No complaints.     PE: /76 (BP Location: Left arm, Patient Position: Chair, Cuff Size: Adult Regular)   Pulse 107   Temp 97.6  F (36.4  C) (Tympanic)   Resp 18   Ht 1.575 m (5' 2\")   Wt 81.6 kg (180 lb)   LMP  (LMP Unknown)   Breastfeeding? No   BMI 32.92 kg/m     See OB flowsheet    GBS negative  Cervix soft, posterior and off to the left    A/P  @ 37w6d normal pregnancy    1. Routine prenatal care.  Labor precautions reviewed.  Discussed elective IOL.  Patient considering for     RTC 1 week    Helen Smallwood M.D.    "

## 2018-12-13 NOTE — PROGRESS NOTES
Prenatal Breastfeeding Education Toolkit provided for patient to review, helping her to make an informed decision on a feeding choice for her baby. Questions directed to the provider.  Declined at today's visit.  Dottie Major, [facility]

## 2018-12-13 NOTE — NURSING NOTE
"Initial /76 (BP Location: Left arm, Patient Position: Chair, Cuff Size: Adult Regular)   Pulse 107   Temp 97.6  F (36.4  C) (Tympanic)   Resp 18   Ht 1.575 m (5' 2\")   Wt 81.6 kg (180 lb)   LMP  (LMP Unknown)   Breastfeeding? No   BMI 32.92 kg/m   Estimated body mass index is 32.92 kg/m  as calculated from the following:    Height as of this encounter: 1.575 m (5' 2\").    Weight as of this encounter: 81.6 kg (180 lb). .    Dottie Major, [facility]    "

## 2018-12-20 ENCOUNTER — HOSPITAL ENCOUNTER (INPATIENT)
Facility: CLINIC | Age: 28
LOS: 4 days | Discharge: HOME | End: 2018-12-24
Attending: OBSTETRICS & GYNECOLOGY | Admitting: OBSTETRICS & GYNECOLOGY
Payer: COMMERCIAL

## 2018-12-20 ENCOUNTER — HOSPITAL ENCOUNTER (OUTPATIENT)
Facility: CLINIC | Age: 28
Discharge: HOME | End: 2018-12-20
Attending: OBSTETRICS & GYNECOLOGY | Admitting: OBSTETRICS & GYNECOLOGY
Payer: COMMERCIAL

## 2018-12-20 ENCOUNTER — PRENATAL OFFICE VISIT (OUTPATIENT)
Dept: OBGYN | Facility: CLINIC | Age: 28
End: 2018-12-20
Payer: COMMERCIAL

## 2018-12-20 VITALS
TEMPERATURE: 97.8 F | WEIGHT: 180.8 LBS | RESPIRATION RATE: 16 BRPM | BODY MASS INDEX: 33.27 KG/M2 | HEIGHT: 62 IN | DIASTOLIC BLOOD PRESSURE: 84 MMHG | HEART RATE: 107 BPM | SYSTOLIC BLOOD PRESSURE: 133 MMHG

## 2018-12-20 VITALS
RESPIRATION RATE: 16 BRPM | SYSTOLIC BLOOD PRESSURE: 127 MMHG | DIASTOLIC BLOOD PRESSURE: 83 MMHG | HEART RATE: 106 BPM | TEMPERATURE: 98.2 F

## 2018-12-20 DIAGNOSIS — Z34.83 PRENATAL CARE, SUBSEQUENT PREGNANCY IN THIRD TRIMESTER: Primary | ICD-10-CM

## 2018-12-20 DIAGNOSIS — Z98.51 S/P TUBAL LIGATION: ICD-10-CM

## 2018-12-20 PROBLEM — O36.8390 FETAL ARRHYTHMIA AFFECTING PREGNANCY, ANTEPARTUM: Status: ACTIVE | Noted: 2018-12-20

## 2018-12-20 LAB
ABO + RH BLD: NORMAL
ABO + RH BLD: NORMAL
ALT SERPL W P-5'-P-CCNC: 9 U/L (ref 0–50)
ANION GAP SERPL CALCULATED.3IONS-SCNC: 9 MMOL/L (ref 3–14)
AST SERPL W P-5'-P-CCNC: 14 U/L (ref 0–45)
BLD GP AB SCN SERPL QL: NORMAL
BLOOD BANK CMNT PATIENT-IMP: NORMAL
BUN SERPL-MCNC: 8 MG/DL (ref 7–30)
CALCIUM SERPL-MCNC: 8.4 MG/DL (ref 8.5–10.1)
CHLORIDE SERPL-SCNC: 106 MMOL/L (ref 94–109)
CO2 SERPL-SCNC: 24 MMOL/L (ref 20–32)
CREAT SERPL-MCNC: 0.69 MG/DL (ref 0.52–1.04)
CREAT UR-MCNC: 386 MG/DL
ERYTHROCYTE [DISTWIDTH] IN BLOOD BY AUTOMATED COUNT: 12.1 % (ref 10–15)
GFR SERPL CREATININE-BSD FRML MDRD: >90 ML/MIN/{1.73_M2}
GLUCOSE SERPL-MCNC: 95 MG/DL (ref 70–99)
HCT VFR BLD AUTO: 33.5 % (ref 35–47)
HGB BLD-MCNC: 11.1 G/DL (ref 11.7–15.7)
MCH RBC QN AUTO: 30.8 PG (ref 26.5–33)
MCHC RBC AUTO-ENTMCNC: 33.1 G/DL (ref 31.5–36.5)
MCV RBC AUTO: 93 FL (ref 78–100)
PLATELET # BLD AUTO: 156 10E9/L (ref 150–450)
POTASSIUM SERPL-SCNC: 3.8 MMOL/L (ref 3.4–5.3)
PROT UR-MCNC: 0.4 G/L
PROT/CREAT 24H UR: 0.1 G/G CR (ref 0–0.2)
RBC # BLD AUTO: 3.6 10E12/L (ref 3.8–5.2)
SODIUM SERPL-SCNC: 139 MMOL/L (ref 133–144)
SPECIMEN EXP DATE BLD: NORMAL
WBC # BLD AUTO: 7.4 10E9/L (ref 4–11)

## 2018-12-20 PROCEDURE — 86850 RBC ANTIBODY SCREEN: CPT | Performed by: OBSTETRICS & GYNECOLOGY

## 2018-12-20 PROCEDURE — 25000128 H RX IP 250 OP 636: Performed by: OBSTETRICS & GYNECOLOGY

## 2018-12-20 PROCEDURE — 59025 FETAL NON-STRESS TEST: CPT

## 2018-12-20 PROCEDURE — 84156 ASSAY OF PROTEIN URINE: CPT | Performed by: OBSTETRICS & GYNECOLOGY

## 2018-12-20 PROCEDURE — 85027 COMPLETE CBC AUTOMATED: CPT | Performed by: OBSTETRICS & GYNECOLOGY

## 2018-12-20 PROCEDURE — 84450 TRANSFERASE (AST) (SGOT): CPT | Performed by: OBSTETRICS & GYNECOLOGY

## 2018-12-20 PROCEDURE — 86780 TREPONEMA PALLIDUM: CPT | Performed by: OBSTETRICS & GYNECOLOGY

## 2018-12-20 PROCEDURE — 80048 BASIC METABOLIC PNL TOTAL CA: CPT | Performed by: OBSTETRICS & GYNECOLOGY

## 2018-12-20 PROCEDURE — 25000132 ZZH RX MED GY IP 250 OP 250 PS 637: Performed by: OBSTETRICS & GYNECOLOGY

## 2018-12-20 PROCEDURE — 3E0P7VZ INTRODUCTION OF HORMONE INTO FEMALE REPRODUCTIVE, VIA NATURAL OR ARTIFICIAL OPENING: ICD-10-PCS | Performed by: OBSTETRICS & GYNECOLOGY

## 2018-12-20 PROCEDURE — 86900 BLOOD TYPING SEROLOGIC ABO: CPT | Performed by: OBSTETRICS & GYNECOLOGY

## 2018-12-20 PROCEDURE — 12000027 ZZH R&B OB

## 2018-12-20 PROCEDURE — 99207 ZZC PRENATAL VISIT: CPT | Performed by: OBSTETRICS & GYNECOLOGY

## 2018-12-20 PROCEDURE — 84460 ALANINE AMINO (ALT) (SGPT): CPT | Performed by: OBSTETRICS & GYNECOLOGY

## 2018-12-20 PROCEDURE — G0463 HOSPITAL OUTPT CLINIC VISIT: HCPCS | Mod: 25

## 2018-12-20 PROCEDURE — 36415 COLL VENOUS BLD VENIPUNCTURE: CPT | Performed by: OBSTETRICS & GYNECOLOGY

## 2018-12-20 PROCEDURE — 86901 BLOOD TYPING SEROLOGIC RH(D): CPT | Performed by: OBSTETRICS & GYNECOLOGY

## 2018-12-20 RX ORDER — SODIUM CHLORIDE, SODIUM LACTATE, POTASSIUM CHLORIDE, CALCIUM CHLORIDE 600; 310; 30; 20 MG/100ML; MG/100ML; MG/100ML; MG/100ML
INJECTION, SOLUTION INTRAVENOUS CONTINUOUS
Status: DISCONTINUED | OUTPATIENT
Start: 2018-12-20 | End: 2018-12-22

## 2018-12-20 RX ORDER — ONDANSETRON 2 MG/ML
4 INJECTION INTRAMUSCULAR; INTRAVENOUS EVERY 6 HOURS PRN
Status: CANCELLED | OUTPATIENT
Start: 2018-12-20

## 2018-12-20 RX ORDER — OXYTOCIN 10 [USP'U]/ML
10 INJECTION, SOLUTION INTRAMUSCULAR; INTRAVENOUS
Status: CANCELLED | OUTPATIENT
Start: 2018-12-20

## 2018-12-20 RX ORDER — OXYTOCIN/0.9 % SODIUM CHLORIDE 30/500 ML
100-340 PLASTIC BAG, INJECTION (ML) INTRAVENOUS CONTINUOUS PRN
Status: DISCONTINUED | OUTPATIENT
Start: 2018-12-20 | End: 2018-12-22

## 2018-12-20 RX ORDER — HYDROXYZINE HYDROCHLORIDE 50 MG/1
50 TABLET, FILM COATED ORAL EVERY 6 HOURS PRN
Status: DISCONTINUED | OUTPATIENT
Start: 2018-12-20 | End: 2018-12-22

## 2018-12-20 RX ORDER — CALCIUM CARBONATE 500 MG/1
1000 TABLET, CHEWABLE ORAL 2 TIMES DAILY PRN
Status: DISCONTINUED | OUTPATIENT
Start: 2018-12-20 | End: 2018-12-20 | Stop reason: HOSPADM

## 2018-12-20 RX ORDER — HYDROXYZINE HYDROCHLORIDE 50 MG/1
50 TABLET, FILM COATED ORAL
Status: DISCONTINUED | OUTPATIENT
Start: 2018-12-20 | End: 2018-12-22

## 2018-12-20 RX ORDER — METHYLERGONOVINE MALEATE 0.2 MG/ML
200 INJECTION INTRAVENOUS
Status: DISCONTINUED | OUTPATIENT
Start: 2018-12-20 | End: 2018-12-22

## 2018-12-20 RX ORDER — CARBOPROST TROMETHAMINE 250 UG/ML
250 INJECTION, SOLUTION INTRAMUSCULAR
Status: DISCONTINUED | OUTPATIENT
Start: 2018-12-20 | End: 2018-12-22

## 2018-12-20 RX ORDER — CARBOPROST TROMETHAMINE 250 UG/ML
250 INJECTION, SOLUTION INTRAMUSCULAR
Status: CANCELLED | OUTPATIENT
Start: 2018-12-20

## 2018-12-20 RX ORDER — OXYTOCIN/0.9 % SODIUM CHLORIDE 30/500 ML
100-340 PLASTIC BAG, INJECTION (ML) INTRAVENOUS CONTINUOUS PRN
Status: CANCELLED | OUTPATIENT
Start: 2018-12-20

## 2018-12-20 RX ORDER — IBUPROFEN 800 MG/1
800 TABLET, FILM COATED ORAL
Status: COMPLETED | OUTPATIENT
Start: 2018-12-20 | End: 2018-12-22

## 2018-12-20 RX ORDER — OXYTOCIN 10 [USP'U]/ML
10 INJECTION, SOLUTION INTRAMUSCULAR; INTRAVENOUS
Status: DISCONTINUED | OUTPATIENT
Start: 2018-12-20 | End: 2018-12-22

## 2018-12-20 RX ORDER — NALOXONE HYDROCHLORIDE 0.4 MG/ML
.1-.4 INJECTION, SOLUTION INTRAMUSCULAR; INTRAVENOUS; SUBCUTANEOUS
Status: DISCONTINUED | OUTPATIENT
Start: 2018-12-20 | End: 2018-12-22

## 2018-12-20 RX ORDER — METHYLERGONOVINE MALEATE 0.2 MG/ML
200 INJECTION INTRAVENOUS
Status: CANCELLED | OUTPATIENT
Start: 2018-12-20

## 2018-12-20 RX ORDER — ACETAMINOPHEN 325 MG/1
650 TABLET ORAL EVERY 4 HOURS PRN
Status: CANCELLED | OUTPATIENT
Start: 2018-12-20

## 2018-12-20 RX ORDER — OXYTOCIN/0.9 % SODIUM CHLORIDE 30/500 ML
1-24 PLASTIC BAG, INJECTION (ML) INTRAVENOUS CONTINUOUS
Status: DISCONTINUED | OUTPATIENT
Start: 2018-12-20 | End: 2018-12-22

## 2018-12-20 RX ORDER — IBUPROFEN 400 MG/1
800 TABLET, FILM COATED ORAL
Status: CANCELLED | OUTPATIENT
Start: 2018-12-20

## 2018-12-20 RX ORDER — NALOXONE HYDROCHLORIDE 0.4 MG/ML
.1-.4 INJECTION, SOLUTION INTRAMUSCULAR; INTRAVENOUS; SUBCUTANEOUS
Status: CANCELLED | OUTPATIENT
Start: 2018-12-20

## 2018-12-20 RX ORDER — CALCIUM CARBONATE 500 MG/1
500 TABLET, CHEWABLE ORAL DAILY PRN
Status: DISCONTINUED | OUTPATIENT
Start: 2018-12-20 | End: 2018-12-22

## 2018-12-20 RX ORDER — ACETAMINOPHEN 325 MG/1
650 TABLET ORAL EVERY 4 HOURS PRN
Status: DISCONTINUED | OUTPATIENT
Start: 2018-12-20 | End: 2018-12-22

## 2018-12-20 RX ORDER — LIDOCAINE 40 MG/G
CREAM TOPICAL
Status: CANCELLED | OUTPATIENT
Start: 2018-12-20

## 2018-12-20 RX ORDER — ONDANSETRON 2 MG/ML
4 INJECTION INTRAMUSCULAR; INTRAVENOUS EVERY 6 HOURS PRN
Status: DISCONTINUED | OUTPATIENT
Start: 2018-12-20 | End: 2018-12-20 | Stop reason: HOSPADM

## 2018-12-20 RX ORDER — LIDOCAINE 40 MG/G
CREAM TOPICAL
Status: DISCONTINUED | OUTPATIENT
Start: 2018-12-20 | End: 2018-12-22

## 2018-12-20 RX ORDER — ONDANSETRON 2 MG/ML
4 INJECTION INTRAMUSCULAR; INTRAVENOUS EVERY 6 HOURS PRN
Status: DISCONTINUED | OUTPATIENT
Start: 2018-12-20 | End: 2018-12-22

## 2018-12-20 RX ORDER — SODIUM CHLORIDE, SODIUM LACTATE, POTASSIUM CHLORIDE, CALCIUM CHLORIDE 600; 310; 30; 20 MG/100ML; MG/100ML; MG/100ML; MG/100ML
INJECTION, SOLUTION INTRAVENOUS CONTINUOUS
Status: CANCELLED | OUTPATIENT
Start: 2018-12-20

## 2018-12-20 RX ORDER — OXYCODONE AND ACETAMINOPHEN 5; 325 MG/1; MG/1
1 TABLET ORAL
Status: DISCONTINUED | OUTPATIENT
Start: 2018-12-20 | End: 2018-12-22 | Stop reason: CLARIF

## 2018-12-20 RX ORDER — OXYTOCIN/0.9 % SODIUM CHLORIDE 30/500 ML
1-24 PLASTIC BAG, INJECTION (ML) INTRAVENOUS CONTINUOUS
Status: CANCELLED | OUTPATIENT
Start: 2018-12-20

## 2018-12-20 RX ORDER — OXYCODONE AND ACETAMINOPHEN 5; 325 MG/1; MG/1
1 TABLET ORAL
Status: CANCELLED | OUTPATIENT
Start: 2018-12-20

## 2018-12-20 RX ADMIN — CALCIUM CARBONATE (ANTACID) CHEW TAB 500 MG 500 MG: 500 CHEW TAB at 21:17

## 2018-12-20 RX ADMIN — CALCIUM CARBONATE (ANTACID) CHEW TAB 500 MG 1000 MG: 500 CHEW TAB at 09:59

## 2018-12-20 RX ADMIN — SODIUM CHLORIDE, POTASSIUM CHLORIDE, SODIUM LACTATE AND CALCIUM CHLORIDE 500 ML: 600; 310; 30; 20 INJECTION, SOLUTION INTRAVENOUS at 21:18

## 2018-12-20 RX ADMIN — HYDROXYZINE HYDROCHLORIDE 50 MG: 50 TABLET, FILM COATED ORAL at 21:17

## 2018-12-20 RX ADMIN — RANITIDINE 150 MG: 150 TABLET, FILM COATED ORAL at 21:17

## 2018-12-20 RX ADMIN — DINOPROSTONE 10 MG: 10 INSERT VAGINAL at 20:24

## 2018-12-20 ASSESSMENT — MIFFLIN-ST. JEOR: SCORE: 1508.35

## 2018-12-20 NOTE — NURSING NOTE
"Initial /84 (BP Location: Right arm, Patient Position: Chair, Cuff Size: Adult Regular)   Pulse 107   Temp 97.8  F (36.6  C) (Tympanic)   Resp 16   Ht 1.575 m (5' 2\")   Wt 82 kg (180 lb 12.8 oz)   LMP  (LMP Unknown)   Breastfeeding? No   BMI 33.07 kg/m   Estimated body mass index is 33.07 kg/m  as calculated from the following:    Height as of this encounter: 1.575 m (5' 2\").    Weight as of this encounter: 82 kg (180 lb 12.8 oz). .    Dottie Major, [facility]    "

## 2018-12-20 NOTE — PROGRESS NOTES
CC) fetal arrhythmia and swelling    PMI)  27 yo   @ 38+6 weeks EGA noted in clinic to have a fetal arrhythmia and  Increased peripheral edema without Headache , visual changes or RUQ discomfort.  Labs were obtained and [facility].    Temp:  [97.8  F (36.6  C)] 97.8  F (36.6  C)  Pulse:  [107] 107  Resp:  [16] 16  BP: (133)/(84) 133/84  SVE 0.5/30/-2   Cat I  Decreasing fetal extra beats per EFM  Labs- normal including CBC, Metabolic panel , LFT and Urine random protein    A) term IUP  Fetal arrhythmia    P) home to care for her ill daughter  RTC tonight for Cervidil  Expect     Michael Steen

## 2018-12-20 NOTE — DISCHARGE INSTRUCTIONS
Discharge Instructions for Undelivered Patients    Diet:    Drink 8 to 12 glasses of liquids (milk, juice, water) every day.    You may eat meals and snacks..    Activity:      Count fetal kicks every day. (See handout.)    Call your doctor if your baby is moving less than usual.    Medicines:    My care team has reviewed my medicines with me.    My care team has given me a list of my medicines.    My care team has prescribed a new medicine. They have either sent it home with me or ordered it from the pharmacy.    Call your provider if you notice:    Swelling in your face or increased swelling in your hands or legs.    Headaches that are not relieved by Tylenol (acetaminophen).    Changes in your vision (blurring; seeing spots or stars).    Nausea (sick to your stomach) and vomiting (throwing up).    Weight gain of 5 pounds per week.    Heartburn that doesn't go away.    Signs of bladder infection: pain when you urinate (use the toilet), needing to go more often or more urgently.    The bag of miranda (membranes) breaks, or you notice leaking in your underwear.    Bright red blood in your underwear.    Abdominal (lower belly) or stomach pain.    For first baby: Contractions (tightenings) less than 5 minutes apart for one hour or more.    For Second (plus) baby: Contractions (tightenings) less than 10 minutes apart and getting stronger.    Increase or change in vaginal discharge (note the color and amount).      Follow up with your provider: come to [facility] at 7[facility] for [facility]adil induction of labor.  Call with questions.  [phone]

## 2018-12-20 NOTE — PLAN OF CARE
S: Discharge from triage  A: Vital Signs  Temp: 98.2  F (36.8  C)  Temp src: Oral  Resp: 16  Pulse: 106  BP: 127/83     RETIRE:FHR  NST Medical Indication: fetal arrhythmia  NST Start Time: 0950  NST Stop Time: 1020  NST Extended Time: Yes  Non-stress Test Interpretation: Reactive  FHTs are category 1. Strip reviewed by Maikel BLAND and Dr. Steen.        Admission on 12/20/2018   Component Date Value     ALT 12/20/2018 9      AST 12/20/2018 14      Sodium 12/20/2018 139      Potassium 12/20/2018 3.8      Chloride 12/20/2018 106      Carbon Dioxide 12/20/2018 24      Anion Gap 12/20/2018 9      Glucose 12/20/2018 95      Urea Nitrogen 12/20/2018 8      Creatinine 12/20/2018 0.69      GFR Estimate 12/20/2018 >90      GFR Estimate If Black 12/20/2018 >90      Calcium 12/20/2018 8.4*     WBC 12/20/2018 7.4      RBC Count 12/20/2018 3.60*     Hemoglobin 12/20/2018 11.1*     Hematocrit 12/20/2018 33.5*     MCV 12/20/2018 93      MCH 12/20/2018 30.8      MCHC 12/20/2018 33.1      RDW 12/20/2018 12.1      Platelet Count 12/20/2018 156      ABO 12/20/2018 B      RH(D) 12/20/2018 Pos      Antibody Screen 12/20/2018 Neg      Test Valid Only At 12/20/2018 [facility]      Specimen Expires 12/20/2018 12/23/2018    Prenatal Office Visit on 12/20/2018   Component Date Value     Protein Random Urine 12/20/2018 0.40      Protein Total Urine g/gr* 12/20/2018 0.10      Creatinine Urine 12/20/2018 386      Dr. ALBERTO Steen informed of above and discharge order received.   R: Plan includes: return to [facility] 12/20/18 at 1900 for cervadil IOL.  Patient instructed to report any recurrence of above concerns to her primary care provider during clinic hours or [facility] at any other time. Patient verbalized understanding of education and agreement with plan. Agrees to call for any problems, questions or concerns.  Discharged undelivered via ambulatory  in stable condition with all belongings. Accompanied by mother, Brandin  .

## 2018-12-20 NOTE — PROGRESS NOTES
"CC: Here for routine prenatal visit @ 38w6d   HPI: + FM, no ctx, no LOF, no VB.  Feeling very swollen.  Denies headache or vision changes.     PE: /84 (BP Location: Right arm, Patient Position: Chair, Cuff Size: Adult Regular)   Pulse 107   Temp 97.8  F (36.6  C) (Tympanic)   Resp 16   Ht 1.575 m (5' 2\")   Wt 82 kg (180 lb 12.8 oz)   LMP  (LMP Unknown)   Breastfeeding? No   BMI 33.07 kg/m     See OB flowsheet    Pedal edema 2+  FHT: skipped beats by ascultation and visualization.    A/P  @ 38w6d pregnancy, edema, fetal arrhythmia    1. Routine prenatal care.  Will send to [facility] for further evaluation for PIH and fetal arrhythmia (most likely PACs from patient's admitted recent caffeine intake and cocoa butter use).  Discussed possible IOL which was planned for tomorrow anyway.      RTC 6 weeks postpartum check    Helen Smallwood M.D.    "

## 2018-12-21 ENCOUNTER — ANESTHESIA (OUTPATIENT)
Dept: OBGYN | Facility: CLINIC | Age: 28
End: 2018-12-21
Payer: COMMERCIAL

## 2018-12-21 ENCOUNTER — ANESTHESIA EVENT (OUTPATIENT)
Dept: OBGYN | Facility: CLINIC | Age: 28
End: 2018-12-21
Payer: COMMERCIAL

## 2018-12-21 LAB — T PALLIDUM AB SER QL: NONREACTIVE

## 2018-12-21 PROCEDURE — 25000125 ZZHC RX 250: Performed by: OBSTETRICS & GYNECOLOGY

## 2018-12-21 PROCEDURE — 25000128 H RX IP 250 OP 636: Performed by: OBSTETRICS & GYNECOLOGY

## 2018-12-21 PROCEDURE — 10907ZC DRAINAGE OF AMNIOTIC FLUID, THERAPEUTIC FROM PRODUCTS OF CONCEPTION, VIA NATURAL OR ARTIFICIAL OPENING: ICD-10-PCS | Performed by: OBSTETRICS & GYNECOLOGY

## 2018-12-21 PROCEDURE — 25000132 ZZH RX MED GY IP 250 OP 250 PS 637: Performed by: OBSTETRICS & GYNECOLOGY

## 2018-12-21 PROCEDURE — 37000011 ZZH ANESTHESIA WARD SERVICE: Performed by: NURSE ANESTHETIST, CERTIFIED REGISTERED

## 2018-12-21 PROCEDURE — 3E0R3BZ INTRODUCTION OF ANESTHETIC AGENT INTO SPINAL CANAL, PERCUTANEOUS APPROACH: ICD-10-PCS | Performed by: OBSTETRICS & GYNECOLOGY

## 2018-12-21 PROCEDURE — 25000125 ZZHC RX 250: Performed by: NURSE ANESTHETIST, CERTIFIED REGISTERED

## 2018-12-21 PROCEDURE — 25000128 H RX IP 250 OP 636: Performed by: NURSE ANESTHETIST, CERTIFIED REGISTERED

## 2018-12-21 PROCEDURE — 3E033VJ INTRODUCTION OF OTHER HORMONE INTO PERIPHERAL VEIN, PERCUTANEOUS APPROACH: ICD-10-PCS | Performed by: OBSTETRICS & GYNECOLOGY

## 2018-12-21 PROCEDURE — 12000031 ZZH R&B OB CRITICAL

## 2018-12-21 PROCEDURE — 00HU33Z INSERTION OF INFUSION DEVICE INTO SPINAL CANAL, PERCUTANEOUS APPROACH: ICD-10-PCS | Performed by: OBSTETRICS & GYNECOLOGY

## 2018-12-21 PROCEDURE — 40000671 ZZH STATISTIC ANESTHESIA CASE

## 2018-12-21 RX ORDER — NALOXONE HYDROCHLORIDE 0.4 MG/ML
.1-.4 INJECTION, SOLUTION INTRAMUSCULAR; INTRAVENOUS; SUBCUTANEOUS
Status: DISCONTINUED | OUTPATIENT
Start: 2018-12-21 | End: 2018-12-22

## 2018-12-21 RX ORDER — DIPHENHYDRAMINE HYDROCHLORIDE 50 MG/ML
25 INJECTION INTRAMUSCULAR; INTRAVENOUS EVERY 6 HOURS PRN
Status: DISCONTINUED | OUTPATIENT
Start: 2018-12-21 | End: 2018-12-23

## 2018-12-21 RX ORDER — DIPHENHYDRAMINE HCL 25 MG
25 CAPSULE ORAL EVERY 6 HOURS PRN
Status: DISCONTINUED | OUTPATIENT
Start: 2018-12-21 | End: 2018-12-23

## 2018-12-21 RX ORDER — EPHEDRINE SULFATE 50 MG/ML
5 INJECTION, SOLUTION INTRAMUSCULAR; INTRAVENOUS; SUBCUTANEOUS
Status: DISCONTINUED | OUTPATIENT
Start: 2018-12-21 | End: 2018-12-23

## 2018-12-21 RX ORDER — EPHEDRINE SULFATE 50 MG/ML
INJECTION, SOLUTION INTRAMUSCULAR; INTRAVENOUS; SUBCUTANEOUS
Status: DISCONTINUED
Start: 2018-12-21 | End: 2018-12-21 | Stop reason: WASHOUT

## 2018-12-21 RX ORDER — LIDOCAINE HYDROCHLORIDE AND EPINEPHRINE 15; 5 MG/ML; UG/ML
INJECTION, SOLUTION EPIDURAL PRN
Status: DISCONTINUED | OUTPATIENT
Start: 2018-12-21 | End: 2018-12-22

## 2018-12-21 RX ORDER — FENTANYL CITRATE 50 UG/ML
INJECTION, SOLUTION INTRAMUSCULAR; INTRAVENOUS PRN
Status: DISCONTINUED | OUTPATIENT
Start: 2018-12-21 | End: 2018-12-22

## 2018-12-21 RX ORDER — BUPIVACAINE HYDROCHLORIDE 2.5 MG/ML
INJECTION, SOLUTION EPIDURAL; INFILTRATION; INTRACAUDAL
Status: COMPLETED
Start: 2018-12-21 | End: 2018-12-21

## 2018-12-21 RX ORDER — LIDOCAINE HCL/EPINEPHRINE/PF 2%-1:200K
VIAL (ML) INJECTION
Status: COMPLETED
Start: 2018-12-21 | End: 2018-12-21

## 2018-12-21 RX ORDER — LIDOCAINE HCL/EPINEPHRINE/PF 2%-1:200K
VIAL (ML) INJECTION PRN
Status: DISCONTINUED | OUTPATIENT
Start: 2018-12-21 | End: 2018-12-22

## 2018-12-21 RX ORDER — ONDANSETRON 2 MG/ML
4 INJECTION INTRAMUSCULAR; INTRAVENOUS EVERY 6 HOURS PRN
Status: DISCONTINUED | OUTPATIENT
Start: 2018-12-21 | End: 2018-12-24 | Stop reason: HOSPADM

## 2018-12-21 RX ORDER — ONDANSETRON 4 MG/1
4 TABLET, ORALLY DISINTEGRATING ORAL EVERY 6 HOURS PRN
Status: DISCONTINUED | OUTPATIENT
Start: 2018-12-21 | End: 2018-12-24 | Stop reason: HOSPADM

## 2018-12-21 RX ORDER — FENTANYL CITRATE 50 UG/ML
INJECTION, SOLUTION INTRAMUSCULAR; INTRAVENOUS
Status: COMPLETED
Start: 2018-12-21 | End: 2018-12-21

## 2018-12-21 RX ORDER — LIDOCAINE HYDROCHLORIDE 10 MG/ML
INJECTION, SOLUTION EPIDURAL; INFILTRATION; INTRACAUDAL; PERINEURAL
Status: COMPLETED
Start: 2018-12-21 | End: 2018-12-21

## 2018-12-21 RX ORDER — LIDOCAINE HYDROCHLORIDE 10 MG/ML
INJECTION, SOLUTION INFILTRATION; PERINEURAL PRN
Status: DISCONTINUED | OUTPATIENT
Start: 2018-12-21 | End: 2018-12-22

## 2018-12-21 RX ORDER — BUPIVACAINE HYDROCHLORIDE 2.5 MG/ML
INJECTION, SOLUTION EPIDURAL; INFILTRATION; INTRACAUDAL PRN
Status: DISCONTINUED | OUTPATIENT
Start: 2018-12-21 | End: 2018-12-22

## 2018-12-21 RX ADMIN — LIDOCAINE HYDROCHLORIDE 9 MG: 10 INJECTION, SOLUTION INFILTRATION; PERINEURAL at 09:06

## 2018-12-21 RX ADMIN — BUPIVACAINE HYDROCHLORIDE 5 ML: 2.5 INJECTION, SOLUTION EPIDURAL; INFILTRATION; INTRACAUDAL at 09:36

## 2018-12-21 RX ADMIN — LIDOCAINE HYDROCHLORIDE,EPINEPHRINE BITARTRATE 5 ML: 20; .005 INJECTION, SOLUTION EPIDURAL; INFILTRATION; INTRACAUDAL; PERINEURAL at 13:26

## 2018-12-21 RX ADMIN — BUPIVACAINE HYDROCHLORIDE 6 ML: 2.5 INJECTION, SOLUTION EPIDURAL; INFILTRATION; INTRACAUDAL at 19:18

## 2018-12-21 RX ADMIN — BUPIVACAINE HYDROCHLORIDE 10 ML/HR: 7.5 INJECTION, SOLUTION EPIDURAL; RETROBULBAR at 09:40

## 2018-12-21 RX ADMIN — LIDOCAINE HYDROCHLORIDE AND EPINEPHRINE 3 MG: 15; 5 INJECTION, SOLUTION EPIDURAL at 09:26

## 2018-12-21 RX ADMIN — BUPIVACAINE HYDROCHLORIDE 5 ML: 2.5 INJECTION, SOLUTION EPIDURAL; INFILTRATION; INTRACAUDAL at 09:31

## 2018-12-21 RX ADMIN — CALCIUM CARBONATE (ANTACID) CHEW TAB 500 MG 500 MG: 500 CHEW TAB at 17:35

## 2018-12-21 RX ADMIN — RANITIDINE 150 MG: 150 TABLET, FILM COATED ORAL at 19:02

## 2018-12-21 RX ADMIN — LIDOCAINE HYDROCHLORIDE,EPINEPHRINE BITARTRATE 5 ML: 20; .005 INJECTION, SOLUTION EPIDURAL; INFILTRATION; INTRACAUDAL; PERINEURAL at 13:30

## 2018-12-21 RX ADMIN — SODIUM CHLORIDE, POTASSIUM CHLORIDE, SODIUM LACTATE AND CALCIUM CHLORIDE 1000 ML: 600; 310; 30; 20 INJECTION, SOLUTION INTRAVENOUS at 07:00

## 2018-12-21 RX ADMIN — DINOPROSTONE 10 MG: 10 INSERT VAGINAL at 00:05

## 2018-12-21 RX ADMIN — FENTANYL CITRATE 100 MCG: 50 INJECTION, SOLUTION INTRAMUSCULAR; INTRAVENOUS at 09:31

## 2018-12-21 RX ADMIN — SODIUM BICARBONATE 1 MEQ: 84 INJECTION, SOLUTION INTRAVENOUS at 09:06

## 2018-12-21 RX ADMIN — LIDOCAINE HYDROCHLORIDE,EPINEPHRINE BITARTRATE 5 ML: 20; .005 INJECTION, SOLUTION EPIDURAL; INFILTRATION; INTRACAUDAL; PERINEURAL at 14:36

## 2018-12-21 RX ADMIN — SODIUM CHLORIDE, POTASSIUM CHLORIDE, SODIUM LACTATE AND CALCIUM CHLORIDE 500 ML: 600; 310; 30; 20 INJECTION, SOLUTION INTRAVENOUS at 22:20

## 2018-12-21 RX ADMIN — SODIUM CHLORIDE, POTASSIUM CHLORIDE, SODIUM LACTATE AND CALCIUM CHLORIDE 250 ML: 600; 310; 30; 20 INJECTION, SOLUTION INTRAVENOUS at 09:59

## 2018-12-21 RX ADMIN — OXYTOCIN-SODIUM CHLORIDE 0.9% IV SOLN 30 UNIT/500ML 2 MILLI-UNITS/MIN: 30-0.9/5 SOLUTION at 10:00

## 2018-12-21 RX ADMIN — LIDOCAINE HYDROCHLORIDE,EPINEPHRINE BITARTRATE 5 ML: 20; .005 INJECTION, SOLUTION EPIDURAL; INFILTRATION; INTRACAUDAL; PERINEURAL at 14:41

## 2018-12-21 RX ADMIN — ONDANSETRON 4 MG: 2 INJECTION INTRAMUSCULAR; INTRAVENOUS at 14:37

## 2018-12-21 RX ADMIN — BUPIVACAINE HYDROCHLORIDE 10 ML/HR: 7.5 INJECTION, SOLUTION EPIDURAL; RETROBULBAR at 23:19

## 2018-12-21 RX ADMIN — SODIUM CHLORIDE, POTASSIUM CHLORIDE, SODIUM LACTATE AND CALCIUM CHLORIDE: 600; 310; 30; 20 INJECTION, SOLUTION INTRAVENOUS at 08:08

## 2018-12-21 NOTE — H&P
[facility] Labor and Delivery History and Physical    Stacie Daniels MRN# [id number]   Age: 28 year old YOB: 1990     Date of Admission:  2018    Primary care provider: Kimberley De La Cruz           Chief Complaint:   Stacie Daniels is a 28 year old female who is 38w6d pregnant and being admitted for induction of labor, indication: Fetal arrhythmia.          Pregnancy history:   First trimester suffered from severe hyperemesis requiring PICC line for IV fluids.  Developed pedal edema and on last clinic visit was noted to have fetal arrhythmia, that improved with prolonged observation earlier today.  She is being admitted at 38+6 weeks for cervical ripening and planned pitocin IOL.  Labs are normal     Lab Results   Component Value Date    WBC 7.4 2018     Lab Results   Component Value Date    RBC 3.60 2018     Lab Results   Component Value Date    HGB 11.1 2018     Lab Results   Component Value Date    HCT 33.5 2018     No components found for: MCT  Lab Results   Component Value Date    MCV 93 2018     Lab Results   Component Value Date    MCH 30.8 2018     Lab Results   Component Value Date    MCHC 33.1 2018     Lab Results   Component Value Date    RDW 12.1 2018     Lab Results   Component Value Date     2018     AST   Date Value Ref Range Status   2018 14 0 - 45 U/L Final     Lab Results   Component Value Date    ALT 9 2018     Uprot =0.1 g/gCr    Blood type B+    Basic Metabolic - normal    OBSTETRIC HISTORY:    Obstetric History       T1      L1     SAB2   TAB0   Ectopic0   Multiple0   Live Births1       # Outcome Date GA Lbr Gonsalo/2nd Weight Sex Delivery Anes PTL Lv   4 Current            3 Term 13 38w6d 06:55 / 01:54 3.487 kg (7 lb 11 oz) F Vag-Spont EPI N YOLANDA      Name: Cindy      Apgar1:  8                Apgar5: 9   2 SAB 07/10/12 5w0d    SAB      1 SAB 10/10/07 5w0d    SAB              EDC: Estimated Date of Delivery: 12/28/18    Prenatal Labs:   Lab Results   Component Value Date    ABO B 12/20/2018    RH Pos 12/20/2018    AS Neg 12/20/2018    HEPBANG Nonreactive 05/29/2018    CHPCRT  12/04/2012     Negative for C. trachomatis rRNA by transcription mediated amplification.   A negative result by transcription mediated amplification does not preclude the   presence of C. trachomatis infection because results are dependent on proper   and adequate collection, absence of inhibitors, and sufficient rRNA to be   detected.    GCPCRT  12/04/2012     Negative for N. gonorrhoeae rRNA by transcription mediated amplification.   A negative result by transcription mediated amplification does not preclude the   presence of N. gonorrhoeae infection because results are dependent on proper   and adequate collection, absence of inhibitors, and sufficient rRNA to be   detected.    TREPAB Negative 12/04/2012    RUBELLAABIGG 14 12/04/2012    HGB 11.1 (L) 12/20/2018    HIV Negative 12/04/2012       GBS Status:   Lab Results   Component Value Date    GBS Negative 11/29/2018       Active Problem List  Patient Active Problem List   Diagnosis     CARDIOVASCULAR SCREENING; LDL GOAL LESS THAN 160     ADHD (attention deficit hyperactivity disorder)     Generalized anxiety disorder     Sciatica     Hyperemesis arising during pregnancy     PTSD (post-traumatic stress disorder)     Prenatal care, subsequent pregnancy     Fetal arrhythmia affecting pregnancy, antepartum       Medication Prior to Admission  Medications Prior to Admission   Medication Sig Dispense Refill Last Dose     Calcium Carbonate Antacid (TUMS PO)    12/20/2018 at 1000     Docusate Calcium (STOOL SOFTENER PO)    12/19/2018 at 2100     hydrOXYzine (ATARAX) 25 MG tablet Take 1-2 tablets (25-50 mg) by mouth every 6 hours as needed for anxiety 60 tablet 3 12/19/2018 at 2100     metoclopramide (REGLAN) 5 MG tablet Take 1-2 tablets (5-10 mg) by mouth every  6 hours as needed (Patient not taking: Reported on 7/16/2018) 240 tablet 1 More than a month at Unknown time     multivitamin, therapeutic (THERA-VIT) TABS tablet Take 1 tablet by mouth daily   12/19/2018 at 1400     ondansetron (ZOFRAN ODT) 8 MG ODT tab Take 1 tablet (8 mg) by mouth 3 times daily (before meals) (Patient not taking: Reported on 7/16/2018) 60 tablet 1 More than a month at Unknown time     prochlorperazine (COMPAZINE) 25 MG Suppository Place 1 suppository (25 mg) rectally every 12 hours as needed for nausea (Patient not taking: Reported on 8/16/2018) 20 suppository 1 More than a month at Unknown time     promethazine (PHENERGAN) 25 MG tablet Take 1 tablet (25 mg) by mouth every 6 hours as needed for nausea (Patient not taking: Reported on 8/16/2018) 20 tablet 0 More than a month at Unknown time     RaNITidine HCl (ZANTAC PO)    12/19/2018 at 2100   .        Maternal Past Medical History:     Past Medical History:   Diagnosis Date     Anxiety      Chickenpox      Febrile convulsion (H)     toddler     Major depressive disorder, recurrent episode, mild (H)     off zoloft     Ulcer     age 20                       Family History:   I have reviewed this patient's family history            Social History:   I have reviewed this patient's social history         Review of Systems:   The Review of Systems is negative other than noted in the HPI          Physical Exam:   Vitals were reviewed  All vitals stable    Temp src: Oral                Constitutional:   awake, alert, cooperative, no apparent distress, and appears stated age     Lungs:   No increased work of breathing, good air exchange, clear to auscultation bilaterally, no crackles or wheezing     Cardiovascular:   Normal apical impulse, regular rate and rhythm, normal S1 and S2, no S3 or S4, and no murmur noted     Abdomen:   No scars, normal bowel sounds, soft, non-distended, non-tender, no masses palpated, no hepatosplenomegally      Genitounirinary:   External Genitalia:  General appearance; normal     Musculoskeletal:   There is no redness, warmth, or swelling of the joints.  Full range of motion noted.  Motor strength is 5 out of 5 all extremities bilaterally.  Tone is normal.  2+ LE edema  motor strength is 5 out of 5 all extremities bilaterally  tone is normal     Neurologic:   Awake, alert, oriented to name, place and time.  Cranial nerves II-XII are grossly intact.  Motor is 5 out of 5 bilaterally.  Cerebellar finger to nose, heel to shin intact.  Sensory is intact.  Babinski down going, Romberg negative, and gait is normal.  DTR 2+/4+ bilaterally , no clonus     Neuropsychiatric:   General: normal, calm and normal eye contact  Level of consciousness: alert / normal  Affect: normal  Orientation: oriented to self, place, time and situation  Memory and insight: normal, memory for past and recent events intact and thought process normal     Skin:   no bruising or bleeding, normal skin color, texture, turgor, no redness, warmth, or swelling and no rashes      Cervix:   Membranes: intact   Dilation: 1   Effacement: 70%   Station:-2   Consistency: soft   Position: Posterior  Presentation:Cephalic  Fetal Heart Rate Tracing: Tier 1 (normal), intermittent additional beats  Tocometer: external monitor and inadequate                       Assessment:   Stacie Daniels is a 38w6d pregnant female admitted with induction of labor, indication Fetal arrhythmia.           Plan:   Admit - see IP orders  cervical ripening with low dose pitocin  Anticipate   Labor induction with Pitocin    Discussed with Stacie Daniels, the following; indications; the agents and methods of labor augmentation, including risks, benefits, and alternative approaches; and the possible need for  birth. EFW is AGA.    The Labor Induction:what you need to know information sheet was made available to her. Questions and concerns were addressed and patient agrees to  above if necessary during the course of her labor.    Michael Steen MD

## 2018-12-21 NOTE — PROGRESS NOTES
More comfortable with epidural.   /68   Temp 98.3  F (36.8  C) (Oral)   Resp 16   LMP  (LMP Unknown)   SpO2 96%   FHT: 140 moderate variability, + accelerations  TOCO: q 2 spont (post cervidil)  SVE: 3/70/-2 off to the left    A/P 39w0d IOL s/p cervidil    1. Fetal arrhythmia: mostly resolved.   2. Epidural per anesthesia  3. Pitocin as needed.     Helen Smallwood M.D.

## 2018-12-21 NOTE — PLAN OF CARE
Patient had her epidural placed and dosed by 0930 and dr Smallwood attempted AROM at 1000.  Pitocin was started at 0930 and AROM performed by Dr Smallwood at 1218 (clear)  Pitocin was discontinued by 1400, when she had too many contractions.   she was dilated to 6 cm then. It was a difficult  epidural insertion  At 1430 after trying position changes and not getting any relief  CRNA reevaluated and catheter was pulled back and she was rebolussed  she got good relief by 1445.  She is resting now VSS FHR tracing has remained cat 1

## 2018-12-21 NOTE — PROGRESS NOTES
Pt called this nurse into the room to check cervidal.  The cervidal was pinched on the outside of the vaginal in the labaila folds.  Pt was taken off the monitor.  Pt took a bath for comfort.  Will reassess Cervix in 1hr.

## 2018-12-21 NOTE — PROGRESS NOTES
Pt took a bath and was placed on monitor.  Pt desires to have her mom here before starting pitocin or getting any pain medication.  Pt denies pain at this time.

## 2018-12-21 NOTE — PROGRESS NOTES
Fetal arrhythmia heard on monitor. Patient questioned the plan for infant after delivery.  Jessica FLORES NP came to bedside to explain plan of care for infant.  All questions answered at this time.  Encouraged patient to ask questions as they come up.

## 2018-12-21 NOTE — PLAN OF CARE
Fetal heart tones assessed and arrhythmia noted. Uterine activity assessed; irritability noted with an occasional contraction that patient notices, but isn't painful. Interventions included cervidil placement and fetal monitoring.  Dr. ALBERTO Steen at bedside around 1930 to assess patient. Plan is to continue to monitor. Patient informed of and agrees with plan of care.

## 2018-12-21 NOTE — ANESTHESIA PROCEDURE NOTES
Peripheral nerve/Neuraxial procedure note : epidural catheter  Pre-Procedure  Performed by  Armin Grace APRN CRNA   Location: OB      Pre-Anesthestic Checklist: patient identified, IV checked, risks and benefits discussed, informed consent, monitors and equipment checked, pre-op evaluation and at physician/surgeon's request    Timeout  Correct Patient: Yes   Correct Procedure: Yes   Correct Site: Yes   Correct Laterality: N/A   Correct Position: Yes   Site Marked: N/A   .   Procedure Documentation    Diagnosis:Active labor.    Procedure:    Epidural catheter.  Insertion Site:L3-4  (midline approach) Injection technique: LORT saline   Local skin infiltrated with 9 mL of 1% lidocaine.  KIAN at 6 cm     Patient Prep;mask, sterile gloves, patient draped.  .  Needle: Touhy needle Needle Gauge: 17.    Needle Length (Inches) 3.5  # of attempts: 1 and  # of redirects:  1 .   Catheter: 19 G . .  Catheter threaded easily  4 cm epidural space.  10 cm at skin.   .    Assessment/Narrative  Paresthesias: No.  .  .  Aspiration negative for heme or CSF  . Test dose of 3 mL at 09:26. Test dose negative for signs of intravascular, subdural or intrathecal injection. Comments:  VAS pain score prior to epidural:  4    VAS pain score after epidural:  0    Pt. Tolerated well, FHR stable.

## 2018-12-21 NOTE — PLAN OF CARE
Contractions spacing to every 4 minutes at times, pitocin restarted. FHT category 1, baseline 135, +accels. Patient comfortable with epidural infusing, able to move BLE, using peanut ball. Last SVE 8/90/-2. FOB and patient's mother at bedside and supportive.

## 2018-12-21 NOTE — ANESTHESIA PREPROCEDURE EVALUATION
Anesthesia Pre-Procedure Evaluation    Patient: Stacie Daniels   MRN: [id number] : 1990          Preoperative Diagnosis: * No surgery found *        Past Medical History:   Diagnosis Date     Anxiety      Chickenpox      Febrile convulsion (H)     toddler     Major depressive disorder, recurrent episode, mild (H)     off zoloft     Ulcer     age 20     Past Surgical History:   Procedure Laterality Date     FOREIGN BODY REMOVAL      metal from her eye as a child       Anesthesia Evaluation     .             ROS/MED HX    ENT/Pulmonary:  - neg pulmonary ROS     Neurologic:  - neg neurologic ROS     Cardiovascular:  - neg cardiovascular ROS       METS/Exercise Tolerance:     Hematologic:         Musculoskeletal:         GI/Hepatic:  - neg GI/hepatic ROS       Renal/Genitourinary:         Endo:         Psychiatric:         Infectious Disease:         Malignancy:         Other:                           Sciatica  PTSD, Anxiety, ADHD, depression      Physical Exam      Airway   Mallampati: I  TM distance: > 3 FB  Neck ROM: full  Mouth opening: > 3 cm    Dental     Cardiovascular       Pulmonary             Lab Results   Component Value Date    WBC 7.4 2018    HGB 11.1 (L) 2018    HCT 33.5 (L) 2018     2018     2018    POTASSIUM 3.8 2018    CHLORIDE 106 2018    CO2 24 2018    BUN 8 2018    CR 0.69 2018    GLC 95 2018    CAMRON 8.4 (L) 2018    ALBUMIN 3.4 2018    PROTTOTAL 6.5 (L) 2018    ALT 9 2018    AST 14 2018    ALKPHOS 45 2018    BILITOTAL 0.4 2018    LIPASE 104 2014    TSH 1.04 2018    HCG Negative 2016    HCGS Negative 2009       Preop Vitals  BP Readings from Last 3 Encounters:   18 118/71   18 127/83   18 133/84    Pulse Readings from Last 3 Encounters:   18 106   18 107   18 107      Resp Readings from Last 3 Encounters:  "  12/21/18 16   12/20/18 16   12/20/18 16    SpO2 Readings from Last 3 Encounters:   09/13/18 98%   05/29/18 100%   05/28/18 100%      Temp Readings from Last 1 Encounters:   12/21/18 36.7  C (98  F) (Oral)    Ht Readings from Last 1 Encounters:   12/20/18 1.575 m (5' 2\")      Wt Readings from Last 1 Encounters:   12/20/18 82 kg (180 lb 12.8 oz)    Estimated body mass index is 33.07 kg/m  as calculated from the following:    Height as of an earlier encounter on 12/20/18: 1.575 m (5' 2\").    Weight as of an earlier encounter on 12/20/18: 82 kg (180 lb 12.8 oz).       Anesthesia Plan      History & Physical Review      ASA Status:  .  OB Epidural Asa: 2            Postoperative Care      Consents  Anesthetic plan, risks, benefits and alternatives discussed with:  Patient..                 STEPHANIE Johansen CRNA  "

## 2018-12-22 ENCOUNTER — ANESTHESIA (OUTPATIENT)
Dept: SURGERY | Facility: CLINIC | Age: 28
End: 2018-12-22
Payer: COMMERCIAL

## 2018-12-22 ENCOUNTER — ANESTHESIA EVENT (OUTPATIENT)
Dept: SURGERY | Facility: CLINIC | Age: 28
End: 2018-12-22
Payer: COMMERCIAL

## 2018-12-22 PROBLEM — Z98.51 S/P TUBAL LIGATION: Status: ACTIVE | Noted: 2018-12-22

## 2018-12-22 PROCEDURE — 25000132 ZZH RX MED GY IP 250 OP 250 PS 637: Performed by: OBSTETRICS & GYNECOLOGY

## 2018-12-22 PROCEDURE — 37000008 ZZH ANESTHESIA TECHNICAL FEE, 1ST 30 MIN: Performed by: OBSTETRICS & GYNECOLOGY

## 2018-12-22 PROCEDURE — 25000125 ZZHC RX 250: Performed by: OBSTETRICS & GYNECOLOGY

## 2018-12-22 PROCEDURE — 0UT70ZZ RESECTION OF BILATERAL FALLOPIAN TUBES, OPEN APPROACH: ICD-10-PCS | Performed by: OBSTETRICS & GYNECOLOGY

## 2018-12-22 PROCEDURE — 25000132 ZZH RX MED GY IP 250 OP 250 PS 637: Performed by: NURSE ANESTHETIST, CERTIFIED REGISTERED

## 2018-12-22 PROCEDURE — 71000012 ZZH RECOVERY PHASE 1 LEVEL 1 FIRST HR: Performed by: OBSTETRICS & GYNECOLOGY

## 2018-12-22 PROCEDURE — 72200001 ZZH LABOR CARE VAGINAL DELIVERY SINGLE

## 2018-12-22 PROCEDURE — 59400 OBSTETRICAL CARE: CPT | Performed by: OBSTETRICS & GYNECOLOGY

## 2018-12-22 PROCEDURE — 37000009 ZZH ANESTHESIA TECHNICAL FEE, EACH ADDTL 15 MIN: Performed by: OBSTETRICS & GYNECOLOGY

## 2018-12-22 PROCEDURE — 58605 DIVISION OF FALLOPIAN TUBE: CPT | Performed by: OBSTETRICS & GYNECOLOGY

## 2018-12-22 PROCEDURE — 25000128 H RX IP 250 OP 636: Performed by: NURSE ANESTHETIST, CERTIFIED REGISTERED

## 2018-12-22 PROCEDURE — 88302 TISSUE EXAM BY PATHOLOGIST: CPT | Mod: 26 | Performed by: OBSTETRICS & GYNECOLOGY

## 2018-12-22 PROCEDURE — 88302 TISSUE EXAM BY PATHOLOGIST: CPT | Performed by: OBSTETRICS & GYNECOLOGY

## 2018-12-22 PROCEDURE — 36000052 ZZH SURGERY LEVEL 2 EA 15 ADDTL MIN: Performed by: OBSTETRICS & GYNECOLOGY

## 2018-12-22 PROCEDURE — 12000027 ZZH R&B OB

## 2018-12-22 PROCEDURE — 27210794 ZZH OR GENERAL SUPPLY STERILE: Performed by: OBSTETRICS & GYNECOLOGY

## 2018-12-22 PROCEDURE — 25000125 ZZHC RX 250: Performed by: NURSE ANESTHETIST, CERTIFIED REGISTERED

## 2018-12-22 PROCEDURE — 36000050 ZZH SURGERY LEVEL 2 1ST 30 MIN: Performed by: OBSTETRICS & GYNECOLOGY

## 2018-12-22 RX ORDER — GLYCOPYRROLATE 0.2 MG/ML
INJECTION, SOLUTION INTRAMUSCULAR; INTRAVENOUS PRN
Status: DISCONTINUED | OUTPATIENT
Start: 2018-12-22 | End: 2018-12-22

## 2018-12-22 RX ORDER — HYDROCODONE BITARTRATE AND ACETAMINOPHEN 5; 325 MG/1; MG/1
1 TABLET ORAL EVERY 4 HOURS PRN
Status: DISCONTINUED | OUTPATIENT
Start: 2018-12-22 | End: 2018-12-24 | Stop reason: HOSPADM

## 2018-12-22 RX ORDER — METOCLOPRAMIDE HYDROCHLORIDE 5 MG/ML
10 INJECTION INTRAMUSCULAR; INTRAVENOUS EVERY 6 HOURS PRN
Status: DISCONTINUED | OUTPATIENT
Start: 2018-12-22 | End: 2018-12-23

## 2018-12-22 RX ORDER — KETOROLAC TROMETHAMINE 30 MG/ML
30 INJECTION, SOLUTION INTRAMUSCULAR; INTRAVENOUS EVERY 6 HOURS
Status: DISCONTINUED | OUTPATIENT
Start: 2018-12-22 | End: 2018-12-22

## 2018-12-22 RX ORDER — NALOXONE HYDROCHLORIDE 0.4 MG/ML
.1-.4 INJECTION, SOLUTION INTRAMUSCULAR; INTRAVENOUS; SUBCUTANEOUS
Status: DISCONTINUED | OUTPATIENT
Start: 2018-12-22 | End: 2018-12-22

## 2018-12-22 RX ORDER — ACETAMINOPHEN 325 MG/1
650 TABLET ORAL EVERY 4 HOURS PRN
Status: DISCONTINUED | OUTPATIENT
Start: 2018-12-22 | End: 2018-12-24 | Stop reason: HOSPADM

## 2018-12-22 RX ORDER — BISACODYL 10 MG
10 SUPPOSITORY, RECTAL RECTAL DAILY PRN
Status: DISCONTINUED | OUTPATIENT
Start: 2018-12-24 | End: 2018-12-24 | Stop reason: HOSPADM

## 2018-12-22 RX ORDER — HYDROCODONE BITARTRATE AND ACETAMINOPHEN 5; 325 MG/1; MG/1
1-2 TABLET ORAL EVERY 6 HOURS PRN
Status: DISCONTINUED | OUTPATIENT
Start: 2018-12-22 | End: 2018-12-22 | Stop reason: DRUGHIGH

## 2018-12-22 RX ORDER — LIDOCAINE HYDROCHLORIDE 10 MG/ML
INJECTION, SOLUTION INFILTRATION; PERINEURAL PRN
Status: DISCONTINUED | OUTPATIENT
Start: 2018-12-22 | End: 2018-12-22

## 2018-12-22 RX ORDER — BUPIVACAINE HYDROCHLORIDE AND EPINEPHRINE 2.5; 5 MG/ML; UG/ML
INJECTION, SOLUTION INFILTRATION; PERINEURAL PRN
Status: DISCONTINUED | OUTPATIENT
Start: 2018-12-22 | End: 2018-12-22 | Stop reason: HOSPADM

## 2018-12-22 RX ORDER — LANOLIN 100 %
OINTMENT (GRAM) TOPICAL
Status: DISCONTINUED | OUTPATIENT
Start: 2018-12-22 | End: 2018-12-24 | Stop reason: HOSPADM

## 2018-12-22 RX ORDER — IBUPROFEN 800 MG/1
800 TABLET, FILM COATED ORAL EVERY 6 HOURS PRN
Status: DISCONTINUED | OUTPATIENT
Start: 2018-12-22 | End: 2018-12-24 | Stop reason: HOSPADM

## 2018-12-22 RX ORDER — OXYTOCIN/0.9 % SODIUM CHLORIDE 30/500 ML
100 PLASTIC BAG, INJECTION (ML) INTRAVENOUS CONTINUOUS
Status: DISCONTINUED | OUTPATIENT
Start: 2018-12-22 | End: 2018-12-24 | Stop reason: HOSPADM

## 2018-12-22 RX ORDER — LIDOCAINE 40 MG/G
CREAM TOPICAL
Status: DISCONTINUED | OUTPATIENT
Start: 2018-12-22 | End: 2018-12-22 | Stop reason: HOSPADM

## 2018-12-22 RX ORDER — HYDROCODONE BITARTRATE AND ACETAMINOPHEN 5; 325 MG/1; MG/1
1-2 TABLET ORAL EVERY 4 HOURS PRN
Status: DISCONTINUED | OUTPATIENT
Start: 2018-12-22 | End: 2018-12-24 | Stop reason: HOSPADM

## 2018-12-22 RX ORDER — KETOROLAC TROMETHAMINE 30 MG/ML
INJECTION, SOLUTION INTRAMUSCULAR; INTRAVENOUS PRN
Status: DISCONTINUED | OUTPATIENT
Start: 2018-12-22 | End: 2018-12-22

## 2018-12-22 RX ORDER — SODIUM CHLORIDE, SODIUM LACTATE, POTASSIUM CHLORIDE, CALCIUM CHLORIDE 600; 310; 30; 20 MG/100ML; MG/100ML; MG/100ML; MG/100ML
INJECTION, SOLUTION INTRAVENOUS CONTINUOUS
Status: DISCONTINUED | OUTPATIENT
Start: 2018-12-22 | End: 2018-12-24 | Stop reason: HOSPADM

## 2018-12-22 RX ORDER — CITRIC ACID/SODIUM CITRATE 334-500MG
SOLUTION, ORAL ORAL PRN
Status: DISCONTINUED | OUTPATIENT
Start: 2018-12-22 | End: 2018-12-22

## 2018-12-22 RX ORDER — AMOXICILLIN 250 MG
1 CAPSULE ORAL 2 TIMES DAILY
Status: DISCONTINUED | OUTPATIENT
Start: 2018-12-22 | End: 2018-12-24 | Stop reason: HOSPADM

## 2018-12-22 RX ORDER — HYDROMORPHONE HYDROCHLORIDE 1 MG/ML
.3-.5 INJECTION, SOLUTION INTRAMUSCULAR; INTRAVENOUS; SUBCUTANEOUS EVERY 5 MIN PRN
Status: DISCONTINUED | OUTPATIENT
Start: 2018-12-22 | End: 2018-12-22

## 2018-12-22 RX ORDER — METOCLOPRAMIDE 10 MG/1
10 TABLET ORAL EVERY 6 HOURS PRN
Status: DISCONTINUED | OUTPATIENT
Start: 2018-12-22 | End: 2018-12-23

## 2018-12-22 RX ORDER — ONDANSETRON 2 MG/ML
INJECTION INTRAMUSCULAR; INTRAVENOUS PRN
Status: DISCONTINUED | OUTPATIENT
Start: 2018-12-22 | End: 2018-12-22

## 2018-12-22 RX ORDER — AMOXICILLIN 250 MG
2 CAPSULE ORAL 2 TIMES DAILY
Status: DISCONTINUED | OUTPATIENT
Start: 2018-12-22 | End: 2018-12-24 | Stop reason: HOSPADM

## 2018-12-22 RX ORDER — DIMENHYDRINATE 50 MG/ML
25 INJECTION, SOLUTION INTRAMUSCULAR; INTRAVENOUS
Status: DISCONTINUED | OUTPATIENT
Start: 2018-12-22 | End: 2018-12-22

## 2018-12-22 RX ORDER — CALCIUM CARBONATE 750 MG/1
750 TABLET, CHEWABLE ORAL 2 TIMES DAILY PRN
Status: DISCONTINUED | OUTPATIENT
Start: 2018-12-22 | End: 2018-12-24 | Stop reason: HOSPADM

## 2018-12-22 RX ORDER — CITRIC ACID/SODIUM CITRATE 334-500MG
30 SOLUTION, ORAL ORAL ONCE
Status: DISCONTINUED | OUTPATIENT
Start: 2018-12-22 | End: 2018-12-22 | Stop reason: HOSPADM

## 2018-12-22 RX ORDER — ALBUTEROL SULFATE 0.83 MG/ML
2.5 SOLUTION RESPIRATORY (INHALATION) EVERY 4 HOURS PRN
Status: DISCONTINUED | OUTPATIENT
Start: 2018-12-22 | End: 2018-12-22

## 2018-12-22 RX ORDER — ONDANSETRON 4 MG/1
4 TABLET, ORALLY DISINTEGRATING ORAL EVERY 30 MIN PRN
Status: DISCONTINUED | OUTPATIENT
Start: 2018-12-22 | End: 2018-12-22

## 2018-12-22 RX ORDER — DEXAMETHASONE SODIUM PHOSPHATE 4 MG/ML
INJECTION, SOLUTION INTRA-ARTICULAR; INTRALESIONAL; INTRAMUSCULAR; INTRAVENOUS; SOFT TISSUE PRN
Status: DISCONTINUED | OUTPATIENT
Start: 2018-12-22 | End: 2018-12-22

## 2018-12-22 RX ORDER — IBUPROFEN 600 MG/1
600 TABLET, FILM COATED ORAL EVERY 6 HOURS PRN
Status: DISCONTINUED | OUTPATIENT
Start: 2018-12-22 | End: 2018-12-24 | Stop reason: HOSPADM

## 2018-12-22 RX ORDER — PROPOFOL 10 MG/ML
INJECTION, EMULSION INTRAVENOUS PRN
Status: DISCONTINUED | OUTPATIENT
Start: 2018-12-22 | End: 2018-12-22

## 2018-12-22 RX ORDER — FENTANYL CITRATE 50 UG/ML
INJECTION, SOLUTION INTRAMUSCULAR; INTRAVENOUS
Status: DISCONTINUED
Start: 2018-12-22 | End: 2018-12-22 | Stop reason: HOSPADM

## 2018-12-22 RX ORDER — OXYTOCIN 10 [USP'U]/ML
10 INJECTION, SOLUTION INTRAMUSCULAR; INTRAVENOUS
Status: DISCONTINUED | OUTPATIENT
Start: 2018-12-22 | End: 2018-12-24 | Stop reason: HOSPADM

## 2018-12-22 RX ORDER — HYDROCORTISONE 2.5 %
CREAM (GRAM) TOPICAL 3 TIMES DAILY PRN
Status: DISCONTINUED | OUTPATIENT
Start: 2018-12-22 | End: 2018-12-24 | Stop reason: HOSPADM

## 2018-12-22 RX ORDER — CLINDAMYCIN PHOSPHATE 900 MG/50ML
900 INJECTION, SOLUTION INTRAVENOUS SEE ADMIN INSTRUCTIONS
Status: DISCONTINUED | OUTPATIENT
Start: 2018-12-22 | End: 2018-12-22 | Stop reason: HOSPADM

## 2018-12-22 RX ORDER — FENTANYL CITRATE 50 UG/ML
25-50 INJECTION, SOLUTION INTRAMUSCULAR; INTRAVENOUS
Status: DISCONTINUED | OUTPATIENT
Start: 2018-12-22 | End: 2018-12-22

## 2018-12-22 RX ORDER — FENTANYL CITRATE 50 UG/ML
INJECTION, SOLUTION INTRAMUSCULAR; INTRAVENOUS PRN
Status: DISCONTINUED | OUTPATIENT
Start: 2018-12-22 | End: 2018-12-22

## 2018-12-22 RX ORDER — SODIUM CHLORIDE, SODIUM LACTATE, POTASSIUM CHLORIDE, CALCIUM CHLORIDE 600; 310; 30; 20 MG/100ML; MG/100ML; MG/100ML; MG/100ML
INJECTION, SOLUTION INTRAVENOUS CONTINUOUS
Status: DISCONTINUED | OUTPATIENT
Start: 2018-12-22 | End: 2018-12-22

## 2018-12-22 RX ORDER — OXYTOCIN/0.9 % SODIUM CHLORIDE 30/500 ML
340 PLASTIC BAG, INJECTION (ML) INTRAVENOUS CONTINUOUS PRN
Status: DISCONTINUED | OUTPATIENT
Start: 2018-12-22 | End: 2018-12-24 | Stop reason: HOSPADM

## 2018-12-22 RX ORDER — SODIUM CHLORIDE, SODIUM LACTATE, POTASSIUM CHLORIDE, CALCIUM CHLORIDE 600; 310; 30; 20 MG/100ML; MG/100ML; MG/100ML; MG/100ML
INJECTION, SOLUTION INTRAVENOUS CONTINUOUS
Status: DISCONTINUED | OUTPATIENT
Start: 2018-12-22 | End: 2018-12-22 | Stop reason: HOSPADM

## 2018-12-22 RX ORDER — ONDANSETRON 2 MG/ML
4 INJECTION INTRAMUSCULAR; INTRAVENOUS EVERY 30 MIN PRN
Status: DISCONTINUED | OUTPATIENT
Start: 2018-12-22 | End: 2018-12-22

## 2018-12-22 RX ORDER — LIDOCAINE 40 MG/G
CREAM TOPICAL
Status: DISCONTINUED | OUTPATIENT
Start: 2018-12-22 | End: 2018-12-23

## 2018-12-22 RX ORDER — MEPERIDINE HYDROCHLORIDE 25 MG/ML
12.5 INJECTION INTRAMUSCULAR; INTRAVENOUS; SUBCUTANEOUS EVERY 5 MIN PRN
Status: DISCONTINUED | OUTPATIENT
Start: 2018-12-22 | End: 2018-12-22

## 2018-12-22 RX ORDER — CLINDAMYCIN PHOSPHATE 900 MG/50ML
900 INJECTION, SOLUTION INTRAVENOUS
Status: DISCONTINUED | OUTPATIENT
Start: 2018-12-22 | End: 2018-12-22 | Stop reason: HOSPADM

## 2018-12-22 RX ORDER — MULTIVITAMIN,THERAPEUTIC
1 TABLET ORAL DAILY
Status: DISCONTINUED | OUTPATIENT
Start: 2018-12-22 | End: 2018-12-24 | Stop reason: HOSPADM

## 2018-12-22 RX ADMIN — IBUPROFEN 800 MG: 800 TABLET ORAL at 03:37

## 2018-12-22 RX ADMIN — Medication 80 MG: at 09:03

## 2018-12-22 RX ADMIN — FENTANYL CITRATE 50 MCG: 50 INJECTION, SOLUTION INTRAMUSCULAR; INTRAVENOUS at 10:03

## 2018-12-22 RX ADMIN — KETOROLAC TROMETHAMINE 30 MG: 30 INJECTION, SOLUTION INTRAMUSCULAR at 09:33

## 2018-12-22 RX ADMIN — FENTANYL CITRATE 100 MCG: 50 INJECTION, SOLUTION INTRAMUSCULAR; INTRAVENOUS at 09:14

## 2018-12-22 RX ADMIN — RANITIDINE 150 MG: 150 TABLET, FILM COATED ORAL at 08:33

## 2018-12-22 RX ADMIN — IBUPROFEN 800 MG: 800 TABLET ORAL at 16:03

## 2018-12-22 RX ADMIN — SODIUM CHLORIDE, POTASSIUM CHLORIDE, SODIUM LACTATE AND CALCIUM CHLORIDE: 600; 310; 30; 20 INJECTION, SOLUTION INTRAVENOUS at 08:56

## 2018-12-22 RX ADMIN — GLYCOPYRROLATE 0.2 MG: 0.2 INJECTION, SOLUTION INTRAMUSCULAR; INTRAVENOUS at 09:00

## 2018-12-22 RX ADMIN — HYDROCODONE BITARTRATE AND ACETAMINOPHEN 2 TABLET: 5; 325 TABLET ORAL at 18:04

## 2018-12-22 RX ADMIN — HYDROCODONE BITARTRATE AND ACETAMINOPHEN 2 TABLET: 5; 325 TABLET ORAL at 21:58

## 2018-12-22 RX ADMIN — FENTANYL CITRATE 150 MCG: 50 INJECTION, SOLUTION INTRAMUSCULAR; INTRAVENOUS at 09:03

## 2018-12-22 RX ADMIN — HYDROCODONE BITARTRATE AND ACETAMINOPHEN 1 TABLET: 5; 325 TABLET ORAL at 10:30

## 2018-12-22 RX ADMIN — FENTANYL CITRATE 50 MCG: 50 INJECTION, SOLUTION INTRAMUSCULAR; INTRAVENOUS at 10:16

## 2018-12-22 RX ADMIN — MIDAZOLAM 2 MG: 1 INJECTION INTRAMUSCULAR; INTRAVENOUS at 09:00

## 2018-12-22 RX ADMIN — ROCURONIUM BROMIDE 10 MG: 10 INJECTION INTRAVENOUS at 09:03

## 2018-12-22 RX ADMIN — HYDROCODONE BITARTRATE AND ACETAMINOPHEN 2 TABLET: 5; 325 TABLET ORAL at 04:53

## 2018-12-22 RX ADMIN — DEXAMETHASONE SODIUM PHOSPHATE 8 MG: 4 INJECTION, SOLUTION INTRA-ARTICULAR; INTRALESIONAL; INTRAMUSCULAR; INTRAVENOUS; SOFT TISSUE at 09:03

## 2018-12-22 RX ADMIN — SODIUM CITRATE AND CITRIC ACID MONOHYDRATE 30 ML: 500; 334 SOLUTION ORAL at 08:57

## 2018-12-22 RX ADMIN — IBUPROFEN 800 MG: 800 TABLET ORAL at 21:58

## 2018-12-22 RX ADMIN — PROPOFOL 200 MG: 10 INJECTION, EMULSION INTRAVENOUS at 09:03

## 2018-12-22 RX ADMIN — SENNOSIDES AND DOCUSATE SODIUM 1 TABLET: 8.6; 5 TABLET ORAL at 18:05

## 2018-12-22 RX ADMIN — HYDROCODONE BITARTRATE AND ACETAMINOPHEN 2 TABLET: 5; 325 TABLET ORAL at 14:12

## 2018-12-22 RX ADMIN — LIDOCAINE HYDROCHLORIDE 100 MG: 10 INJECTION, SOLUTION INFILTRATION; PERINEURAL at 09:03

## 2018-12-22 RX ADMIN — ONDANSETRON 4 MG: 2 INJECTION INTRAMUSCULAR; INTRAVENOUS at 09:33

## 2018-12-22 ASSESSMENT — LIFESTYLE VARIABLES: TOBACCO_USE: 1

## 2018-12-22 NOTE — PLAN OF CARE
Patient c/o increasing pain in lower abdomen. Sangita Ma at bedside to administer epidural bolus dose. Patient denies pain and much more comfortable post bolus. Contractions every 2-4 min and lasting 60-90 seconds. FHT category 1, baseline 145, + accels. Last SVE 9/95/0 with cervix remaining on right side, no cervix felt on left side.

## 2018-12-22 NOTE — ANESTHESIA POSTPROCEDURE EVALUATION
Patient: Stacie Daniels    Procedure(s):  COMBINED LAPAROTOMY MINI, TUBAL LIGATION (POST PARTUM)    Diagnosis:Desires Sterlization  Diagnosis Additional Information: No value filed.    Anesthesia Type:  General, ETT, RSI    Note:  Anesthesia Post Evaluation    Patient location during evaluation: Floor  Patient participation: Able to fully participate in evaluation  Level of consciousness: awake  Pain management: adequate  Airway patency: patent  Cardiovascular status: acceptable and hemodynamically stable  Respiratory status: acceptable, room air and spontaneous ventilation  Hydration status: acceptable  PONV: none     Anesthetic complications: None          Last vitals:  Vitals:    12/22/18 1015 12/22/18 1032 12/22/18 1045   BP: 112/79 116/79 118/75   Pulse: 88 96 88   Resp: 18 14 16   Temp:      SpO2: 95% 99%          Electronically Signed By: STEPHANIE Ahumada CRNA  December 22, 2018  11:41 AM

## 2018-12-22 NOTE — ANESTHESIA CARE TRANSFER NOTE
Patient: Stacie Daniels    Procedure(s):  COMBINED LAPAROTOMY MINI, TUBAL LIGATION (POST PARTUM)    Diagnosis: Desires Sterlization  Diagnosis Additional Information: No value filed.    Anesthesia Type:   General, ETT, RSI     Note:  Airway :Nasal Cannula  Patient transferred to:Labor and Delivery  Handoff Report: Identifed the Patient, Identified the Reponsible Provider, Reviewed the pertinent medical history, Discussed the surgical course, Reviewed Intra-OP anesthesia mangement and issues during anesthesia, Set expectations for post-procedure period and Allowed opportunity for questions and acknowledgement of understanding      Vitals: (Last set prior to Anesthesia Care Transfer)    CRNA VITALS  12/22/2018 0913 - 12/22/2018 0957      12/22/2018             SpO2:  96 %                Electronically Signed By: STEPHANIE Ahumada CRNA  December 22, 2018  9:57 AM

## 2018-12-22 NOTE — PLAN OF CARE
Arrived back to her room about 1040 today  denies pain  has been up in room ad [place] and has been able to urinate  she has had no n/v  pain relieved with norco  Enjoying her baby and family

## 2018-12-22 NOTE — PLAN OF CARE
Patient was prepped and taken to [facility] for her PPTL scheduled today at 0845.  She was taken by wheelchair.

## 2018-12-22 NOTE — BRIEF OP NOTE
[facility] Brief Operative Note    Pre-operative diagnosis: Desires Sterlization   Post-operative diagnosis s/p tubal ligation     Procedure: Procedure(s):  COMBINED LAPAROTOMY MINI, TUBAL LIGATION (POST PARTUM)   Surgeon(s): Surgeon(s) and Role:     * Helen Smallwood MD - Primary   Estimated blood loss: * No values recorded between 12/22/2018  9:15 AM and 12/22/2018  9:43 AM *    Specimens: ID Type Source Tests Collected by Time Destination   A : Bilateral Fallopian Tubes Tissue Fallopian Tube, Bilateral SURGICAL PATHOLOGY EXAM Helen Smallwood MD 12/22/2018  9:22 AM       Findings: Normal appearing tubes and ovaries

## 2018-12-22 NOTE — ANESTHESIA PREPROCEDURE EVALUATION
Anesthesia Pre-Procedure Evaluation    Patient: Stacie Daniels   MRN: [id number] : 1990          Preoperative Diagnosis: Desires Sterlization    Procedure(s):  COMBINED LAPAROTOMY MINI, TUBAL LIGATION (POST PARTUM)    Past Medical History:   Diagnosis Date     Anxiety      Chickenpox      Febrile convulsion (H)     toddler     Major depressive disorder, recurrent episode, mild (H)     off zoloft     Ulcer     age 20     Past Surgical History:   Procedure Laterality Date     FOREIGN BODY REMOVAL      metal from her eye as a child       Anesthesia Evaluation     . Pt has had prior anesthetic. Type: MAC and Regional    No history of anesthetic complications          ROS/MED HX    ENT/Pulmonary:     (+)tobacco use, Past use , . .    Neurologic:       Cardiovascular:  - neg cardiovascular ROS       METS/Exercise Tolerance:  >4 METS   Hematologic:  - neg hematologic  ROS       Musculoskeletal:  - neg musculoskeletal ROS       GI/Hepatic:     (+) GERD Symptomatic, Other GI/Hepatic h/o ulcer      Renal/Genitourinary:     (+) Other Renal/ Genitourinary,  0200        Endo:  - neg endo ROS       Psychiatric:     (+) psychiatric history other (comment), depression and anxiety (PTSD ADHD)      Infectious Disease:  - neg infectious disease ROS       Malignancy:      - no malignancy   Other:    - neg other ROS                      Physical Exam  Normal systems: cardiovascular, pulmonary and dental    Airway   Mallampati: II  TM distance: >3 FB  Neck ROM: full    Dental     Cardiovascular       Pulmonary             Lab Results   Component Value Date    WBC 7.4 2018    HGB 11.1 (L) 2018    HCT 33.5 (L) 2018     2018     2018    POTASSIUM 3.8 2018    CHLORIDE 106 2018    CO2 24 2018    BUN 8 2018    CR 0.69 2018    GLC 95 2018    CAMRON 8.4 (L) 2018    ALBUMIN 3.4 2018    PROTTOTAL 6.5 (L) 2018    ALT 9 2018    AST  "14 12/20/2018    ALKPHOS 45 05/29/2018    BILITOTAL 0.4 05/29/2018    LIPASE 104 03/30/2014    TSH 1.04 01/02/2018    HCG Negative 04/09/2016    HCGS Negative 04/17/2009       Preop Vitals  BP Readings from Last 3 Encounters:   12/22/18 117/69   12/20/18 127/83   12/20/18 133/84    Pulse Readings from Last 3 Encounters:   12/22/18 101   12/20/18 106   12/20/18 107      Resp Readings from Last 3 Encounters:   12/22/18 18   12/20/18 16   12/20/18 16    SpO2 Readings from Last 3 Encounters:   12/21/18 95%   09/13/18 98%   05/29/18 100%      Temp Readings from Last 1 Encounters:   12/22/18 37  C (98.6  F) (Oral)    Ht Readings from Last 1 Encounters:   12/20/18 1.575 m (5' 2\")      Wt Readings from Last 1 Encounters:   12/20/18 82 kg (180 lb 12.8 oz)    Estimated body mass index is 33.07 kg/m  as calculated from the following:    Height as of an earlier encounter on 12/20/18: 1.575 m (5' 2\").    Weight as of an earlier encounter on 12/20/18: 82 kg (180 lb 12.8 oz).       Anesthesia Plan      History & Physical Review  History and physical reviewed and following examination; no interval change.    ASA Status:  2 .    NPO Status:  > 6 hours    Plan for General, ETT and RSI with Intravenous and Propofol induction. Maintenance will be Inhalation and Balanced.    PONV prophylaxis:  Ondansetron (or other 5HT-3) and Dexamethasone or Solumedrol       Postoperative Care  Postoperative pain management:  IV analgesics and Oral pain medications.      Consents  Anesthetic plan, risks, benefits and alternatives discussed with:  Patient and Spouse..                 STEPHANIE Ahumada CRNA  "

## 2018-12-22 NOTE — L&D DELIVERY NOTE
"Delivery Summary    Stacie Daniels MRN# [id number]   Age: 28 year old YOB: 1990     28 year old  presented at 38w6d for induction of labor secondary to fetal arrhythmia.   Pregnancy complicated by hyperemesis gravidarum earlier in the pregnancy requiring PICC line therapy  Cx on admission was 2/60/-2 at 1912,   Cervidil inserted in the vagina for induction of labor  Removed following morning and Cx at 2-3/60-70/-3 at 0641,   Pitocin augmentation with max to 8 mU/min  AROM occurred at 0955,  with clear blood fluid  S/p epidural  Progressed to complete/+1 station at  2340,      at 0144,   Viable female infant in cephalic presentation  Terminal meconium  No nuchal cord  Weight 9#3oz  Apgar 8 and 9 at 1 and 5 minutes, respectively  Placenta delivered spontaneously, intact with 3 vessel cord  Right sided sulcal vaginal laceration not repaired as it was hemostatic. Laceration sustained prior to deliver of infant, made hemostatic with pressure application while awaiting delivery of infant   mL   \"Izolde aka Stephanie\"    Trung Alcantar MD  [facility]                       Labor Event Times    Labor onset date:  18 Onset time:   1:50 PM   Dilation complete date:  18 Complete time:  11:40 PM   Start pushing date/time:  2018 2340      Labor Events     labor?:  No   steroids:  None  Labor Type:  AROM, Induction  Predominate monitoring during 1st stage:  continuous electronic fetal monitoring     Rupture date/time: 18 1218   Rupture type:  Artificial Rupture of Membranes  Fluid color:  Clear  Fluid odor:  Normal     Induction:  Cervidil, Oxytocin, AROM  Induction date/time:     Cervical ripening date/time:     Indications for induction:  Elective     1:1 continuous labor support provided by?:  RN Labor partogram used?:  no      Delivery/Placenta Date and Time    Delivery Date:  18 Delivery Time:   " "1:40 AM   Placenta Date/Time:  2018  1:48 AM  Oxytocin given at the time of delivery:  after delivery of baby     Vaginal Counts     Initial count performed by 2 team members:   Two Team Members   VANESSA Hyatt RN       [employer]   Initial counts 2  5    Added to count  1 10    Final counts 2 1 15    Placed during labor Accounted for at the end of labor   No NA   No NA   Yes Yes    Final count performed by 2 team members:   Two Team Members   Tania Alcantar      Final count correct?:  Yes     Apgars    Living status:  Living   1 Minute 5 Minute 10 Minute 15 Minute 20 Minute   Skin color: 0  1       Heart rate: 2  2       Reflex irritability: 2  2       Muscle tone: 2  2       Respiratory effort: 2  2       Total: 8  9       Apgars assigned by:  GERARDO JACOME     Cord    Vessels:  3 Vessels Complications:  None   Cord Blood Disposition:  Lab       [place]ar Resuscitation    Methods:  Oximetry, NCPAP, Suctioning  [place] Care at Delivery:  Terminal meconium at delivery. Infant to warmer at 1 minute of age, Bulb suctioned, at 4 min 50 sec O2 saturations was 76% 0146 CPAP 80% applied.  O2 saturations up to 95% P 162, 0151 deleed for 6 ml thick pink tinged fluid. 155 , 100% on CPAP 40%, 0151 CPAP on 21%. 98.8 Ax temp. 0157 CPAP off.  Output in Delivery Room:  Stool      Measurements    Weight:  9 lb 3 oz Length:  1' 10\"   Head circumference:  34.3 cm       Labor Events and Shoulder Dystocia    Fetal Tracing Prior to Delivery:  Category 2  Shoulder dystocia present?:  Neg     Delivery (Maternal) (Provider to Complete) (059835)    Episiotomy:  None  Perineal lacerations:  None    Sulcus laceration:  right Repaired?:  No   Vaginal laceration?:  No    Cervical laceration?:  No    Number of repair packets:  0     Blood Loss  Mother: Stacie Daniels RAUL #[id number]   Start of Mother's Information    IO Blood Loss  18 1350 - 18 0210 "    None           End of Mother's Information  Mother: Stacie Daniels #[id number]         Delivery - Provider to Complete ([id number])    Delivering clinician:  Trung Alcantar MD  Attempted Delivery Types (Choose all that apply):  Spontaneous Vaginal Delivery  Delivery Type (Choose the 1 that will go to the Birth History):  Vaginal, Spontaneous   Other personnel:   Provider Role   Anna Rea, RN Delivery Nurse   Tania Hernandez RN Charge Nurse   Gail Arroyo NP Nurse Practitioner         Placenta    Delayed Cord Clamping:  Done  Date/Time:  12/22/2018  1:48 AM  Removal:  Spontaneous  Disposition:  Hospital disposal     Anesthesia    Method:  Epidural          Presentation and Position    Presentation:  Vertex   Occiput Anterior           Trung Alcantar MD

## 2018-12-22 NOTE — OP NOTE
Op Note    Preop Dx: 1. Desires sterilization    Postop DX: 1. Desires sterilization    Procedure: postpartum tubal ligation via salpingectomy     Surgeon: Helen Smallwood M.D.     Assist: n/a    Anesthesia: geta    FRA Score: 2    EBL: minimal    IVF: see anesthesia records    UOP: see anesthesia records    Complications: none    Findings: Normal tubes and ovaries    Indications: Stacie Daniels is a 28 year old  4 Para  who delivered vaginally and desires sterilization.   Patient understood risks and benefits including risks of bleeding, infection and damage to surrounding structures.  Patient understands that a tubal ligation is not reversible and will result in her inability to conceive spontaneously.  Discussed the risk of regret.  Patient agreed to proceed.     Procedure: Patient was brought to the operating room and general anesthesia was administered without difficulty.  She was prepped and draped in the usual sterile fashion in the dorsal supine position.  The infraumbilical fold was infiltrated with 0.25% marcaine with epinephrine.  The infraumbilical fold was incised sharply and taken down through the subcutaneous tissue, the fascia and the peritoneum.  An Jean Claude-O retractor was placed in the incision.  The fallopian tubes were followed out to the fimbriated ends bilaterally.  The mesosalpinx was sequentially desiccated and transected bilaterally.  The tubes were amputated at the cornua bilaterally.  The tubes were sent to pathology for further evaluation.   Excellent hemostasis was noted.  The Jean Claude-O was removed.  The fascia and peritoneum was repaired with 0-Vicryl in a running fashion.   The subcutaneous tissue was repaired with 3-0 Plain in a running fashion.   The skin was closed with 4-0 V-Heidi-90 in a subcuticular fashion.   The incision was dressed with a steri-strip.      Patient tolerated procedure well.  Sponge, lap and needle counts are correct.  I was present for the entire  procedure.  Patient was taken to her recovery room in stable condition.

## 2018-12-22 NOTE — PROGRESS NOTES
PPD # 0    S: patient without complaints.  Ready for postpartum tubal ligation   O: /71   Pulse 103   Temp 97.4  F (36.3  C) (Oral)   Resp 12   LMP  (LMP Unknown)   SpO2 95%   Breastfeeding? Unknown    NAD  Abd: soft, nontender, fundus firm  Ext: calves nontender    A/P PPD # 0 s/p     Routine PP care. Proceed with postpartum tubal ligation as scheduled.     Helen Smallwood M.D.

## 2018-12-22 NOTE — PLAN OF CARE
S:Delivery  B:Induced  Labor,39w1d    Lab Results   Component Value Date    GBS Negative 2018    with antibiotic treatment not indicated 4 hours prior to delivery.  A: Patient delivered   lac right sulcus tear at 0140 with Dr. JOSE Alcantar in attendance and baby placed on mother's abdomen for delayed cord clamping. Baby dried and stimulated. Baby placed  skin to skin @ 0210.. Apgars 9/9.  IV infusion of Oxytocin  infused. Placenta removal spontaneous. MD does not want placenta sent to pathology.  See Flowsheet for VS and PP checks.  .  Labor care plan goals met, transition now to postpartum care.  R: Expect routine postpartum care. Anticipate first feeding within the hour or whenever infant displays feeding cues. Continue skin to skin. Prior discussion with mother indicates that feeding plan is Formula feeding. Educated mother on importance of exclusive breastfeeding, expected feeding readiness cues and encouraged her to observe for these cues while rooming in. Informed her that feeding assistance would be provided.

## 2018-12-23 PROCEDURE — 12000027 ZZH R&B OB

## 2018-12-23 PROCEDURE — 25000132 ZZH RX MED GY IP 250 OP 250 PS 637: Performed by: OBSTETRICS & GYNECOLOGY

## 2018-12-23 RX ADMIN — IBUPROFEN 800 MG: 800 TABLET ORAL at 22:12

## 2018-12-23 RX ADMIN — SENNOSIDES AND DOCUSATE SODIUM 1 TABLET: 8.6; 5 TABLET ORAL at 17:09

## 2018-12-23 RX ADMIN — HYDROCODONE BITARTRATE AND ACETAMINOPHEN 2 TABLET: 5; 325 TABLET ORAL at 12:53

## 2018-12-23 RX ADMIN — RANITIDINE 150 MG: 150 TABLET, FILM COATED ORAL at 20:50

## 2018-12-23 RX ADMIN — SENNOSIDES AND DOCUSATE SODIUM 1 TABLET: 8.6; 5 TABLET ORAL at 08:46

## 2018-12-23 RX ADMIN — HYDROCODONE BITARTRATE AND ACETAMINOPHEN 2 TABLET: 5; 325 TABLET ORAL at 08:47

## 2018-12-23 RX ADMIN — IBUPROFEN 600 MG: 600 TABLET ORAL at 04:03

## 2018-12-23 RX ADMIN — MULTIVITAMIN TABLET 1 TABLET: TABLET at 08:46

## 2018-12-23 RX ADMIN — IBUPROFEN 600 MG: 600 TABLET ORAL at 10:09

## 2018-12-23 RX ADMIN — RANITIDINE 150 MG: 150 TABLET, FILM COATED ORAL at 08:46

## 2018-12-23 RX ADMIN — IBUPROFEN 800 MG: 800 TABLET ORAL at 16:02

## 2018-12-23 RX ADMIN — HYDROCODONE BITARTRATE AND ACETAMINOPHEN 2 TABLET: 5; 325 TABLET ORAL at 02:27

## 2018-12-23 RX ADMIN — HYDROCODONE BITARTRATE AND ACETAMINOPHEN 2 TABLET: 5; 325 TABLET ORAL at 17:09

## 2018-12-23 RX ADMIN — HYDROCODONE BITARTRATE AND ACETAMINOPHEN 2 TABLET: 5; 325 TABLET ORAL at 20:50

## 2018-12-23 NOTE — PLAN OF CARE
Data: Vital signs within normal limits. Postpartum checks within normal limits - see flow record. Patient eating and drinking normally. Patient able to empty bladder independently. Reports + flatus. Patient ambulating independently..   No apparent signs of infection. Incision healing well. Patient Is performing self cares and Is able to care for infant. Positive attachment behaviors are observed with infant. Support persons are present.  Action:  Pain plan was discussed. Patient would like pain meds to be brought in when they are due. Patient was medicated during the shift for pain. See MAR.Patient education done about formula feeding,  cares, postpartum cares and pain management/plan. See flow record.  Response:   Patient reassessed within 1 hour after each medication for pain. Patient stated that pain had improved. Patient stated that she was comfortable. .   Plan: Anticipate discharge on 18

## 2018-12-23 NOTE — ANESTHESIA POSTPROCEDURE EVALUATION
Patient: Stacie Daniels    * No procedures listed *    Diagnosis:* No pre-op diagnosis entered *  Diagnosis Additional Information: No value filed.    Anesthesia Type:  No value filed.    Note:  Anesthesia Post Evaluation    Patient location during evaluation: Floor  Patient participation: Able to fully participate in evaluation  Level of consciousness: awake and alert  Pain management: adequate  Airway patency: patent  Cardiovascular status: acceptable  Respiratory status: acceptable  Hydration status: acceptable  PONV: none     Anesthetic complications: None          Last vitals:  Vitals:    12/23/18 0215 12/23/18 0305 12/23/18 0835   BP: 104/61  111/72   Pulse:   99   Resp: 18 18 16   Temp: 36.4  C (97.6  F)  36.6  C (97.9  F)   SpO2: 98%           Electronically Signed By: Jamaal Krishna CRNA, STEPHANIE ALEXANDER  December 23, 2018  11:28 AM

## 2018-12-23 NOTE — PLAN OF CARE
Patient progressing well.  Ambulating, Eating and voiding well. Independent with mother/baby cares. Bonding well with infant. Bottle feeding is going well, infant is tolerating formula. Patient rates Incisional pain tolerable with discomfort.  Taking Ibuprofen and Norco when due with good relief.  Made plan with patient to offer pain medication when due. Patient encouraged to report increased bleeding or clots.   rooming in and is supportive and helpful.

## 2018-12-23 NOTE — PROGRESS NOTES
PPD # 1/POD #1    S: pain controlled.  Ambulating and voiding  O: /72   Pulse 99   Temp 97.9  F (36.6  C) (Oral)   Resp 16   LMP  (LMP Unknown)   SpO2 98%   Breastfeeding? Unknown    NAD  Abd: soft, nontender, fundus firm  Inc: clean and intact  Ext: calves nontender    Hemoglobin   Date Value Ref Range Status   2018 11.1 (L) 11.7 - 15.7 g/dL Final       A/P PPD # 1/POD#1 s/p  and postpartum tubal ligation     Routine PP care.  Anticipate discharge tomorrow.     Helen Smallwood M.D.

## 2018-12-23 NOTE — PLAN OF CARE
Data: Vital signs within normal limits. Postpartum checks within normal limits - see flow record. Patient eating and drinking normally. Patient able to empty bladder independently and is up ambulating. No apparent signs of infection. Incision and perineum  healing well. Patient performing self cares and is able to care for infant.  Action: Patient medicated during the shift for pain and cramping. See MAR. Patient reassessed within 1 hour after each medication and pain was improved - patient stated she was comfortable. Patient education done about incision, pain management. See flow record.  Response: Positive attachment behaviors observed with infant. Support persons 1 present.   Plan: Anticipate discharge on 12/23/18 or 12/24/18.

## 2018-12-23 NOTE — ANESTHESIA CARE TRANSFER NOTE
Patient: Stacie Daniels    * No procedures listed *    Diagnosis: * No pre-op diagnosis entered *  Diagnosis Additional Information: No value filed.    Anesthesia Type:   No value filed.     Note:    Patient transferred to:Labor and Delivery  Handoff Report: Identifed the Patient, Identified the Reponsible Provider, Reviewed the pertinent medical history, Discussed the surgical course, Reviewed Intra-OP anesthesia mangement and issues during anesthesia, Set expectations for post-procedure period and Allowed opportunity for questions and acknowledgement of understanding      Vitals: (Last set prior to Anesthesia Care Transfer)              Electronically Signed By: Jamaal Krishna CRNA, APRN CATHERINE  December 23, 2018  11:28 AM

## 2018-12-24 VITALS
SYSTOLIC BLOOD PRESSURE: 108 MMHG | RESPIRATION RATE: 18 BRPM | TEMPERATURE: 98 F | HEART RATE: 87 BPM | OXYGEN SATURATION: 97 % | DIASTOLIC BLOOD PRESSURE: 67 MMHG

## 2018-12-24 PROCEDURE — 25000132 ZZH RX MED GY IP 250 OP 250 PS 637: Performed by: OBSTETRICS & GYNECOLOGY

## 2018-12-24 RX ORDER — OXYCODONE HYDROCHLORIDE 5 MG/1
5 TABLET ORAL EVERY 6 HOURS PRN
Qty: 12 TABLET | Refills: 0 | Status: SHIPPED | OUTPATIENT
Start: 2018-12-24 | End: 2018-12-24

## 2018-12-24 RX ORDER — HYDROCODONE BITARTRATE AND ACETAMINOPHEN 5; 325 MG/1; MG/1
1 TABLET ORAL EVERY 6 HOURS PRN
Qty: 12 TABLET | Refills: 0 | Status: SHIPPED | OUTPATIENT
Start: 2018-12-24 | End: 2019-03-21

## 2018-12-24 RX ORDER — ACETAMINOPHEN 500 MG
500-1000 TABLET ORAL EVERY 6 HOURS PRN
Qty: 100 TABLET | Refills: 0 | Status: SHIPPED | OUTPATIENT
Start: 2018-12-24 | End: 2018-12-24

## 2018-12-24 RX ORDER — IBUPROFEN 800 MG/1
800 TABLET, FILM COATED ORAL EVERY 8 HOURS PRN
Qty: 90 TABLET | Refills: 1 | Status: SHIPPED | OUTPATIENT
Start: 2018-12-24 | End: 2019-03-21

## 2018-12-24 RX ORDER — AMOXICILLIN 250 MG
1 CAPSULE ORAL DAILY
Qty: 60 TABLET | Refills: 0 | Status: SHIPPED | OUTPATIENT
Start: 2018-12-24 | End: 2019-03-21

## 2018-12-24 RX ADMIN — HYDROCODONE BITARTRATE AND ACETAMINOPHEN 2 TABLET: 5; 325 TABLET ORAL at 05:28

## 2018-12-24 RX ADMIN — SENNOSIDES AND DOCUSATE SODIUM 1 TABLET: 8.6; 5 TABLET ORAL at 08:04

## 2018-12-24 RX ADMIN — HYDROCODONE BITARTRATE AND ACETAMINOPHEN 2 TABLET: 5; 325 TABLET ORAL at 01:08

## 2018-12-24 RX ADMIN — MULTIVITAMIN TABLET 1 TABLET: TABLET at 08:04

## 2018-12-24 RX ADMIN — IBUPROFEN 800 MG: 800 TABLET ORAL at 05:28

## 2018-12-24 RX ADMIN — HYDROCODONE BITARTRATE AND ACETAMINOPHEN 2 TABLET: 5; 325 TABLET ORAL at 10:13

## 2018-12-24 RX ADMIN — RANITIDINE 150 MG: 150 TABLET, FILM COATED ORAL at 08:05

## 2018-12-24 NOTE — DISCHARGE INSTRUCTIONS
Discharge Instruction for Laparoscopic Fallopian Tube Ligation  Your doctor did a sterilization procedure called laparoscopic fallopian tube ligation to prevent any future pregnancies. This is a permanent form of birth control. Several different methods of surgical sterilization can be used to block the fallopian tubes. They all stop the egg from entering the womb (uterus) and sperm from traveling up to fertilize the egg. After a laparoscopic procedure, the incisions on your abdomen may be sore. You may also have pain in your upper back or shoulders. This is from the gas used to enlarge the abdomen to let your doctor to see inside your pelvis and do the procedure. This pain usually goes away in a day or two.  Here's what you can do to speed your recovery after surgery.   Home care    Take it easy. Stay quiet and rest for 2 days.    Return to your normal activities after 48 hours.     Eat a normal diet.    If needed, take an over-the-counter pain reliever for pain.    Don't lift anything heavier than 10 pounds for 1 week after the procedure.    Don't drive for at least 24 hours after the procedure and until pain is minimal without taking opioids if prescribed    Don't have sex for 2 weeks after surgery.  Follow-up care  Make a follow-up appointment as directed by our staff.     When to call your healthcare provider  Call your healthcare provider right away if you have any of the following:    Fever above 100.4 F (38 C) or higher, or as directed by your healthcare provider    Chills    Dizziness or fainting    Abdominal pain and swelling that get worse    Nausea and vomiting    Signs of infection. These include drainage, pus, warmth, or redness at your incision site.    Sudden chest pain or shortness of breath    Inability to empty your bladder   Date Last Reviewed: 11/1/2017 2000-2018 [employer]. [address] [place] [address], [place] [address]. All rights reserved. This information is not intended as a  substitute for professional medical care. Always follow your healthcare professional's instructions.      Postpartum Vaginal Delivery Instructions    Activity       Ask family and friends for help when you need it.    Do not place anything in your vagina for 6 weeks.    You are not restricted on other activities, but take it easy for a few weeks to allow your body to recover from delivery.  You are able to do any activities you feel up to that point.    No driving until you have stopped taking your pain medications (usually two weeks after delivery).     Call your health care provider if you have any of these symptoms:       Increased pain, swelling, redness, or fluid around your stiches from an episiotomy or perineal tear.    A fever above 100.4 F (38 C) with or without chills when placing a thermometer under your tongue.    You soak a sanitary pad with blood within 1 hour, or you see blood clots larger than a golf ball.    Bleeding that lasts more than 6 weeks.    Vaginal discharge that smells bad.    Severe pain, cramping or tenderness in your lower belly area.    A need to urinate more frequently (use the toilet more often), more urgently (use the toilet very quickly), or it burns when you urinate.    Nausea and vomiting.    Redness, swelling or pain around a vein in your leg.    Problems breastfeeding or a red or painful area on your breast.    Chest pain and cough or are gasping for air.    Problems coping with sadness, anxiety, or depression.  If you have any concerns about hurting yourself or the baby, call your provider immediately.     You have questions or concerns after you return home.     Keep your hands clean:  Always wash your hands before touching your perineal area and stitches.  This helps reduce your risk of infection.  If your hands aren't dirty, you may use an alcohol hand-rub to clean your hands. Keep your nails clean and short.      If you have concerns/questions after returning home, contact  the nurse triage line [phone] or your primary care clinic.  If you you feel sad, have difficulty sleeping, feel overwhelmed, anxious or have troubling thoughts you may call your clinic, or the HelpLine for Pregnancy & Postpartum Support [place]. ([facility] HelpLine 631-596-2014) or online resource list  @ www.ppsupport[place].org

## 2018-12-24 NOTE — PLAN OF CARE
VSS. Fundus firm at U. Scant bleeding noted. Incision closed with liquid bandage, WDL. Ice pack to incision for comfort. Tucks/spray to perineum. Ibuprofen and Norco for pain. Up ad franklin. Tolerating regular diet. + flatus no BM. Independent in cares and attentive to all of baby's needs. Doing well and hopeful to discharge today. ROP paperwork is filled out and in chart, needs notary and signatures.

## 2018-12-24 NOTE — PLAN OF CARE
Data: Vital signs within normal limits. Postpartum checks within normal limits - see flow record. Patient eating and drinking normally. Patient able to empty bladder independently and is up ambulating. No apparent signs of infection. Perineum  healing well. Patient performing self cares and is able to care for infant.  Action: Patient medicated during the shift for pain. See MAR. Patient reassessed within 1 hour after each medication and pain was improved - patient stated she was comfortable. Patient education done about discharge instructions and warning signs. See flow record.  Response: Positive attachment behaviors observed with infant. Support persons Crystal present.   Plan: Anticipate discharge today.   Patient discharged per ambulatory with infant in car seat. Mother verified that her band matches her infant's band by comparing the infant's ID band #[id number].  Discharge instructions given. Encouraged to call for any problems, questions or concerns. RXs Picked up from pharmacy.

## 2018-12-24 NOTE — DISCHARGE SUMMARY
[facility] Discharge Summary    Stacie Daniels MRN# [id number]   Age: 28 year old YOB: 1990     Date of Admission:  2018  Date of Discharge::  2018  Admitting Physician:  Helen Smallwood MD  Discharge Physician:  Trung Alcantar MD     Home clinic: [facility]          Admission Diagnoses:     Intrauterine pregnancy at 38w6d  GBS negative status  Induction of labor for fetal arrythmia  Desires permanent sterilization          Discharge Diagnosis:   Viable female infant, delivered  S/p   S/p PPTL          Procedures:   Procedure(s): Spontaneous vaginal delivery       Postpartum tubal ligation via salpingectomy           Medications Prior to Admission:     Medications Prior to Admission   Medication Sig Dispense Refill Last Dose     Calcium Carbonate Antacid (TUMS PO)    2018 at 1000     Docusate Calcium (STOOL SOFTENER PO)    2018 at 2100     multivitamin, therapeutic (THERA-VIT) TABS tablet Take 1 tablet by mouth daily   2018 at 1400     RaNITidine HCl (ZANTAC PO)    2018 at 2100             Discharge Medications:     Current Discharge Medication List      START taking these medications    Details   HYDROcodone-acetaminophen (NORCO) 5-325 MG tablet Take 1 tablet by mouth every 6 hours as needed for pain  Qty: 12 tablet, Refills: 0    Associated Diagnoses:  (spontaneous vaginal delivery); S/P tubal ligation      ibuprofen (ADVIL/MOTRIN) 800 MG tablet Take 1 tablet (800 mg) by mouth every 8 hours as needed for moderate pain  Qty: 90 tablet, Refills: 1    Associated Diagnoses:  (spontaneous vaginal delivery); S/P tubal ligation      senna-docusate (SENOKOT-S/PERICOLACE) 8.6-50 MG tablet Take 1 tablet by mouth daily  Qty: 60 tablet, Refills: 0    Associated Diagnoses:  (spontaneous vaginal delivery); S/P tubal ligation         CONTINUE these medications which have NOT CHANGED    Details   Calcium Carbonate Antacid (TUMS PO)   "     Docusate Calcium (STOOL SOFTENER PO)       multivitamin, therapeutic (THERA-VIT) TABS tablet Take 1 tablet by mouth daily      RaNITidine HCl (ZANTAC PO)                    Consultations:   No consultations were requested during this admission          Brief History of Labor:     Stacie Daniels is a 28 year old female who is 38w6d pregnant and being admitted for induction of labor, indication: Fetal arrhythmia.           Hospital Course:     28 year old  presented at 38w6d for induction of labor secondary to fetal arrhythmia.   Pregnancy complicated by hyperemesis gravidarum earlier in the pregnancy requiring PICC line therapy  Cx on admission was 2/60/-2 at 1912,   Cervidil inserted in the vagina for induction of labor  Removed following morning and Cx at 2-3/60-70/-3 at 0641,   Pitocin augmentation with max to 8 mU/min  AROM occurred at 0955,  with clear blood fluid  S/p epidural  Progressed to complete/+1 station at  2340,       at 0144,   Viable female infant in cephalic presentation  Terminal meconium  No nuchal cord  Weight 9#3oz  Apgar 8 and 9 at 1 and 5 minutes, respectively  Placenta delivered spontaneously, intact with 3 vessel cord  Right sided sulcal vaginal laceration not repaired as it was hemostatic. Laceration sustained prior to deliver of infant, made hemostatic with pressure application while awaiting delivery of infant   mL   \"Izolde aka Stephanie\"      The patient's hospital course was unremarkable. On PPD#0, she also underwent a postpartum tubal ligation. Her post-operative course following that was unremarkable. On discharge, her pain was well controlled. Vaginal bleeding is similar to peak menstrual flow.  Voiding without difficulty.  Ambulating well and tolerating a normal diet.  No fever.  Breastfeeding well.  Infant is stable.  No bowel movement yet but able to pass flatus. She was discharged on post-partum day #2.    Post-partum hemoglobin: "   Hemoglobin   Date Value Ref Range Status   12/20/2018 11.1 (L) 11.7 - 15.7 g/dL Final   09/13/2018 12.1 11.7 - 15.7 g/dL Final           Discharge Instructions and Follow-Up:   Discharge diet: Regular   Discharge activity: Your activity upon discharge: Activity as tolerated  No lifting or strenuous exercise for 6 weeks  No driving for 2 weeks or no driving while on narcotic pain medications  Nothing in the vagina including sex, douching or tampons for 6 weeks   Discharge follow-up: Follow up with primary care provider in 6 weeks   Wound care: Drink plenty of fluids  Ice to area for comfort  Keep wound clean and dry           Discharge Disposition:   Discharged to home      Attestation:  I have reviewed today's vital signs, notes, medications, labs and imaging.    Trung Alcantar MD   [facility]

## 2018-12-24 NOTE — PLAN OF CARE
Patient is stable. Pain well managed with ibuprofen and norco. Abdominal incision is clean, dry and intact. FF at U/U and small lochia rubra without clots. Patient encouraged to ambulate in hallway this evening. Bonding well with infant. FOB at bedside and supportive.

## 2018-12-24 NOTE — PROGRESS NOTES
PPD#2  Doing well. Pain well controlled on oral pain medication. Tolerating regular diet. Voiding well. Passing flatus. Ambulating without difficulty. Lochia is scant. Breast feeding.     Vitals:    18 0835 18 1521 18 2040 18 2224   BP: 111/72 112/68  117/57   BP Location:    Left arm   Cuff Size:    Adult Regular   Pulse: 99 95     Resp: 16 16 16 16   Temp: 97.9  F (36.6  C) 98  F (36.7  C)  98.4  F (36.9  C)   TempSrc: Oral Oral  Oral   SpO2:    97%     General Appearance: NAD  Incision: clean, dry and intact  Abdomen: Soft, NT, ND. Fundus firm at U-2  Extremities: NT, trace edema    Hemoglobin   Date Value Ref Range Status   2018 11.1 (L) 11.7 - 15.7 g/dL Final   2018 12.1 11.7 - 15.7 g/dL Final       A/P: 28 year old  PPD#2 s/p . Hemodynamically stable.   -- outpatient precautions, expectations reviewed   -- stable for discharge home today    Trung Alcantar MD  [facility]

## 2018-12-26 LAB — COPATH REPORT: NORMAL

## 2018-12-31 NOTE — RESULT ENCOUNTER NOTE
Confirmation of fallopian tubes removed and are benign.     Trung Alcantar MD  [facility]

## 2019-03-08 ENCOUNTER — PRENATAL OFFICE VISIT (OUTPATIENT)
Dept: OBGYN | Facility: CLINIC | Age: 29
End: 2019-03-08
Payer: COMMERCIAL

## 2019-03-08 VITALS
HEIGHT: 62 IN | HEART RATE: 76 BPM | WEIGHT: 157.8 LBS | DIASTOLIC BLOOD PRESSURE: 68 MMHG | BODY MASS INDEX: 29.04 KG/M2 | RESPIRATION RATE: 16 BRPM | TEMPERATURE: 98.4 F | SYSTOLIC BLOOD PRESSURE: 103 MMHG

## 2019-03-08 PROBLEM — O36.8390 FETAL ARRHYTHMIA AFFECTING PREGNANCY, ANTEPARTUM: Status: RESOLVED | Noted: 2018-12-20 | Resolved: 2019-03-08

## 2019-03-08 PROCEDURE — 99207 ZZC POST PARTUM EXAM: CPT | Performed by: OBSTETRICS & GYNECOLOGY

## 2019-03-08 ASSESSMENT — ANXIETY QUESTIONNAIRES
1. FEELING NERVOUS, ANXIOUS, OR ON EDGE: NOT AT ALL
2. NOT BEING ABLE TO STOP OR CONTROL WORRYING: NOT AT ALL
3. WORRYING TOO MUCH ABOUT DIFFERENT THINGS: NOT AT ALL
GAD7 TOTAL SCORE: 0
7. FEELING AFRAID AS IF SOMETHING AWFUL MIGHT HAPPEN: NOT AT ALL
5. BEING SO RESTLESS THAT IT IS HARD TO SIT STILL: NOT AT ALL
6. BECOMING EASILY ANNOYED OR IRRITABLE: NOT AT ALL

## 2019-03-08 ASSESSMENT — PATIENT HEALTH QUESTIONNAIRE - PHQ9
SUM OF ALL RESPONSES TO PHQ QUESTIONS 1-9: 2
5. POOR APPETITE OR OVEREATING: NOT AT ALL

## 2019-03-08 ASSESSMENT — MIFFLIN-ST. JEOR: SCORE: 1399.03
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
## 2019-03-09 ASSESSMENT — ANXIETY QUESTIONNAIRES: GAD7 TOTAL SCORE: 0

## 2019-03-21 ENCOUNTER — OFFICE VISIT (OUTPATIENT)
Dept: FAMILY MEDICINE | Facility: CLINIC | Age: 29
End: 2019-03-21
Payer: COMMERCIAL

## 2019-03-21 VITALS
HEART RATE: 76 BPM | TEMPERATURE: 98.5 F | OXYGEN SATURATION: 100 % | WEIGHT: 155 LBS | SYSTOLIC BLOOD PRESSURE: 116 MMHG | BODY MASS INDEX: 28.35 KG/M2 | DIASTOLIC BLOOD PRESSURE: 75 MMHG

## 2019-03-21 DIAGNOSIS — R60.9 EDEMA, UNSPECIFIED TYPE: Primary | ICD-10-CM

## 2019-03-21 LAB
ANION GAP SERPL CALCULATED.3IONS-SCNC: 8 MMOL/L (ref 3–14)
BUN SERPL-MCNC: 6 MG/DL (ref 7–30)
CALCIUM SERPL-MCNC: 8.9 MG/DL (ref 8.5–10.1)
CHLORIDE SERPL-SCNC: 108 MMOL/L (ref 94–109)
CO2 SERPL-SCNC: 26 MMOL/L (ref 20–32)
CREAT SERPL-MCNC: 0.74 MG/DL (ref 0.52–1.04)
GFR SERPL CREATININE-BSD FRML MDRD: >90 ML/MIN/{1.73_M2}
GLUCOSE SERPL-MCNC: 78 MG/DL (ref 70–99)
POTASSIUM SERPL-SCNC: 3.7 MMOL/L (ref 3.4–5.3)
SODIUM SERPL-SCNC: 142 MMOL/L (ref 133–144)
TSH SERPL DL<=0.005 MIU/L-ACNC: 0.98 MU/L (ref 0.4–4)

## 2019-03-21 PROCEDURE — 36415 COLL VENOUS BLD VENIPUNCTURE: CPT | Performed by: PHYSICIAN ASSISTANT

## 2019-03-21 PROCEDURE — 80048 BASIC METABOLIC PNL TOTAL CA: CPT | Performed by: PHYSICIAN ASSISTANT

## 2019-03-21 PROCEDURE — 99214 OFFICE O/P EST MOD 30 MIN: CPT | Performed by: PHYSICIAN ASSISTANT

## 2019-03-21 PROCEDURE — 84443 ASSAY THYROID STIM HORMONE: CPT | Performed by: PHYSICIAN ASSISTANT

## 2019-03-21 NOTE — PROGRESS NOTES
SUBJECTIVE:   Stacie Daniels is a 29 year old female who presents to clinic today for the following health issues:    Discuss getting thyroid checked- both grandmothers had hypothyroidism.       Duration: 3 months     Intensity:  Swelling can get severe by the end of the day, other symptoms- moderate     Accompanying signs and symptoms: a lot of sweating, insomnia, trouble losing weight, less energy, brittle hair, hair loss, swelling- pt states they go up 2 pant sizes by the end of the day- swelling started during pregnancy.     History (similar episodes/previous evaluation): None    Precipitating or alleviating factors: compression socks, increase water intake, exercise    Therapies tried and outcome: compression socks, increase water, and exercise      Patient had a baby 3 months ago and wants to make sure her thyroid is normal.   Has a 5 year old at home also. 2 girls.   Breastfeeding? No  Periods-not back yet    Has two small varicose veins.     Has been walking/light weights for exercise.     Is wearing tighter/long socks.    Edema is better than last month but still bothersome.   Shoes don't fit by the end of the day.   No shortness of breath or chest pain.   Bilateral legs.     No leg pain.       Problem list and histories reviewed & adjusted, as indicated.  Additional history: as documented    Patient Active Problem List   Diagnosis     CARDIOVASCULAR SCREENING; LDL GOAL LESS THAN 160     ADHD (attention deficit hyperactivity disorder)     Generalized anxiety disorder     Sciatica     Hyperemesis arising during pregnancy     PTSD (post-traumatic stress disorder)     Prenatal care, subsequent pregnancy     Fetal arrhythmia before the onset of labor     S/P tubal ligation     Past Surgical History:   Procedure Laterality Date     FOREIGN BODY REMOVAL      metal from her eye as a child     LAPAROTOMY MINI, TUBAL LIGATION (POST PARTUM), COMBINED Bilateral 12/22/2018    Procedure: COMBINED LAPAROTOMY MINI,  TUBAL LIGATION (POST PARTUM);  Surgeon: Helen Smallwood MD;  Location: [facility] OR       Social History     Tobacco Use     Smoking status: Former Smoker     Smokeless tobacco: Never Used   Substance Use Topics     Alcohol use: No     Comment: rare-quit with pregnancy     Family History   Problem Relation Age of Onset     C.A.D. Father      Cancer Father         lymphoma     Prostate Cancer Father      Cancer Maternal Grandmother         rectal     Depression Maternal Grandmother      Cerebrovascular Disease Maternal Grandmother      Prostate Cancer Maternal Grandfather      Breast Cancer Paternal Grandmother      Hypertension Paternal Grandmother      Depression Paternal Grandmother      Thyroid Disease Paternal Grandmother      Hypertension Paternal Grandfather      Depression Mother      Bipolar Disorder Mother      Depression Sister      Bipolar Disorder Sister      Depression Sister      Schizophrenia Maternal Uncle      Suicide Paternal Aunt      Diabetes No family hx of      Glaucoma No family hx of      Macular Degeneration No family hx of          No current outpatient medications on file.     Allergies   Allergen Reactions     Augmentin Hives     Avocado Hives and Swelling       Reviewed and updated as needed this visit by clinical staff  Tobacco  Allergies  Meds  Med Hx  Surg Hx  Fam Hx  Soc Hx      Reviewed and updated as needed this visit by Provider         ROS:  Constitutional, HEENT, cardiovascular, pulmonary, GI, , musculoskeletal, neuro, skin, endocrine and psych systems are negative, except as otherwise noted.    OBJECTIVE:     /75   Pulse 76   Temp 98.5  F (36.9  C) (Oral)   Wt 70.3 kg (155 lb)   SpO2 100%   BMI 28.35 kg/m    Body mass index is 28.35 kg/m .  GENERAL: healthy, alert and no distress  NECK: no adenopathy, no asymmetry, masses, or scars and thyroid normal to palpation  RESP: lungs clear to auscultation - no rales, rhonchi or wheezes  CV: regular rate and rhythm,  normal S1 S2, no S3 or S4, no murmur, click or rub, no peripheral edema and peripheral pulses strong  MS: no gross musculoskeletal defects noted, no edema  NEURO: Normal strength and tone, mentation intact and speech normal  PSYCH: mentation appears normal, affect normal/bright      ASSESSMENT/PLAN:     ASSESSMENT / PLAN:  (R60.9) Edema, unspecified type  (primary encounter diagnosis)  Comment:   Plan: TSH with free T4 reflex, Basic metabolic panel        Will rule out thyroid with labs  Also will get kidney function    Exam is benign  If labs negative-will monitor and she will recheck if worsening or if new symptoms occur  Symptoms are slowly improving, they are likely related to pregnancy and weight gain from that  Consider compression stockings in future also as possible treatment      Arlin Vizcaino PA-C  [facility]

## 2019-03-21 NOTE — LETTER
March 22, 2019    Stacie Daniels  [address]  [place] [address]        Dear Stacie,    It was a pleasure to see you at your recent office visit.  Your test results are listed below. Normal thyroid. Sodium, potassium, kidney  function normal.  Blood sugar normal, no diabetes.            If you have any questions or concerns, please call the clinic at [phone].    Sincerely,  Arlin Vizcaino PA-C    Results for orders placed or performed in visit on 03/21/19   TSH with free T4 reflex   Result Value Ref Range    TSH 0.98 0.40 - 4.00 mU/L   Basic metabolic panel   Result Value Ref Range    Sodium 142 133 - 144 mmol/L    Potassium 3.7 3.4 - 5.3 mmol/L    Chloride 108 94 - 109 mmol/L    Carbon Dioxide 26 20 - 32 mmol/L    Anion Gap 8 3 - 14 mmol/L    Glucose 78 70 - 99 mg/dL    Urea Nitrogen 6 (L) 7 - 30 mg/dL    Creatinine 0.74 0.52 - 1.04 mg/dL    GFR Estimate >90 >60 mL/min/[1.73_m2]    GFR Estimate If Black >90 >60 mL/min/[1.73_m2]    Calcium 8.9 8.5 - 10.1 mg/dL

## 2019-03-22 NOTE — RESULT ENCOUNTER NOTE
Dear Stacie,      It was a pleasure to see you at your recent office visit.  Your test results are listed below. Normal thyroid. Sodium, potassium, kidney  function normal.  Blood sugar normal, no diabetes.            If you have any questions or concerns, please call the clinic at [phone].    Sincerely,  Arlin Vizcaino PA-C

## 2019-11-12 ENCOUNTER — OFFICE VISIT (OUTPATIENT)
Dept: OPTOMETRY | Facility: CLINIC | Age: 29
End: 2019-11-12
Payer: COMMERCIAL

## 2019-11-12 DIAGNOSIS — H52.13 MYOPIA OF BOTH EYES: Primary | ICD-10-CM

## 2019-11-12 DIAGNOSIS — H52.223 REGULAR ASTIGMATISM OF BOTH EYES: ICD-10-CM

## 2019-11-12 PROCEDURE — 92014 COMPRE OPH EXAM EST PT 1/>: CPT | Performed by: OPTOMETRIST

## 2019-11-12 PROCEDURE — 92015 DETERMINE REFRACTIVE STATE: CPT | Performed by: OPTOMETRIST

## 2019-11-12 PROCEDURE — 92340 FIT SPECTACLES MONOFOCAL: CPT | Performed by: OPTOMETRIST

## 2019-11-12 ASSESSMENT — REFRACTION_MANIFEST
OS_SPHERE: -1.75
OS_SPHERE: -1.75
OS_AXIS: 140
OS_CYLINDER: +0.25
OS_AXIS: 109
METHOD_AUTOREFRACTION: 1
OD_CYLINDER: +0.25
OD_SPHERE: -1.75
OD_CYLINDER: +0.50
OD_SPHERE: -1.50
OD_AXIS: 041
OD_AXIS: 015
OS_CYLINDER: +0.50

## 2019-11-12 ASSESSMENT — KERATOMETRY
OS_AXISANGLE2_DEGREES: 6
OD_K1POWER_DIOPTERS: 45.00
OD_K2POWER_DIOPTERS: 45.25
OS_K2POWER_DIOPTERS: 45.50
OS_K1POWER_DIOPTERS: 44.50
OD_AXISANGLE2_DEGREES: 156

## 2019-11-12 ASSESSMENT — VISUAL ACUITY
METHOD: SNELLEN - LINEAR
OS_SC: 20/20
OD_SC: 20/20
OS_CC+: +2
CORRECTION_TYPE: GLASSES
OS_CC: 20/30
OD_CC+: -2
OD_CC: 20/40

## 2019-11-12 ASSESSMENT — SLIT LAMP EXAM - LIDS
COMMENTS: NORMAL
COMMENTS: NORMAL

## 2019-11-12 ASSESSMENT — REFRACTION_WEARINGRX
OD_SPHERE: -1.75
OS_AXIS: 140
OS_SPHERE: -1.75
SPECS_TYPE: SVL
OD_AXIS: 020
OS_CYLINDER: +0.50
OD_CYLINDER: +0.50

## 2019-11-12 ASSESSMENT — CUP TO DISC RATIO
OD_RATIO: 0.3
OS_RATIO: 0.3

## 2019-11-12 ASSESSMENT — TONOMETRY
OS_IOP_MMHG: 15
OD_IOP_MMHG: 15
IOP_METHOD: APPLANATION

## 2019-11-12 ASSESSMENT — CONF VISUAL FIELD
OS_NORMAL: 1
OD_NORMAL: 1
METHOD: COUNTING FINGERS

## 2019-11-12 ASSESSMENT — EXTERNAL EXAM - RIGHT EYE: OD_EXAM: NORMAL

## 2019-11-12 ASSESSMENT — EXTERNAL EXAM - LEFT EYE: OS_EXAM: NORMAL

## 2019-11-12 NOTE — PATIENT INSTRUCTIONS
Patient was advised of today's exam findings.  Optional to fill new glasses prescription, minimal change  Call for new contact lenses fit- in interested within the next 6 months   Return in 1 year for eye exam    Yumi Tran O.D.  [facility]   [address] [place], [place] [id number] [phone]

## 2019-12-05 ENCOUNTER — OFFICE VISIT (OUTPATIENT)
Dept: FAMILY MEDICINE | Facility: CLINIC | Age: 29
End: 2019-12-05

## 2019-12-05 VITALS
OXYGEN SATURATION: 99 % | HEART RATE: 75 BPM | WEIGHT: 132 LBS | RESPIRATION RATE: 16 BRPM | BODY MASS INDEX: 24.14 KG/M2 | DIASTOLIC BLOOD PRESSURE: 54 MMHG | TEMPERATURE: 98.6 F | SYSTOLIC BLOOD PRESSURE: 106 MMHG

## 2019-12-05 DIAGNOSIS — H00.025 HORDEOLUM INTERNUM OF LEFT LOWER EYELID: Primary | ICD-10-CM

## 2019-12-05 PROCEDURE — 99213 OFFICE O/P EST LOW 20 MIN: CPT | Performed by: FAMILY MEDICINE

## 2019-12-05 RX ORDER — POLYMYXIN B SULFATE AND TRIMETHOPRIM 1; 10000 MG/ML; [USP'U]/ML
1-2 SOLUTION OPHTHALMIC EVERY 4 HOURS
Qty: 10 ML | Refills: 0 | Status: SHIPPED | OUTPATIENT
Start: 2019-12-05 | End: 2020-03-02

## 2019-12-05 NOTE — NURSING NOTE
"Chief Complaint   Patient presents with     Eye Problem       Initial /54   Pulse 75   Temp 98.6  F (37  C) (Tympanic)   Resp 16   Wt 59.9 kg (132 lb)   SpO2 99%   BMI 24.14 kg/m   Estimated body mass index is 24.14 kg/m  as calculated from the following:    Height as of 3/8/19: 1.575 m (5' 2\").    Weight as of this encounter: 59.9 kg (132 lb).    Patient presents to the clinic using No DME    Health Maintenance that is potentially due pending provider review:  Annual visit, flu shot, and PHQ9.    Pt will schedule appt.    Is there anyone who you would like to be able to receive your results? No  If yes have patient fill out SPRING    Sandra Carnes CMA(AAMA)    "

## 2019-12-05 NOTE — PROGRESS NOTES
"Stacie Daniels is a 29 year old female comes in today with the following concerns.      1. Sty on left eye, started about 3 weeks ago. Has stopped using her makeup. Has been doing warm compresses and using witch hazel around the outside of her eye. Has started to worsen this week. More painful and burning.     Sandra Carnes CMA([facility])      *  S: Stacie Daniels is a 29 year old female with L eye red spot, some pus, a \"stye\" lower lid.      Slow to heal, bothering her.  Something to help?     No other lesions    Warm packing has helped some?      O:/54   Pulse 75   Temp 98.6  F (37  C) (Tympanic)   Resp 16   Wt 59.9 kg (132 lb)   SpO2 99%   BMI 24.14 kg/m    GEN: Alert and oriented, in no acute distress  Has 3mm red bump L lower lid, can see some of it more from the inside.      A: hordeolum internum    P: keep doing what she's doing, gave some polytrim which may help a bit given internal aspect.  She understands it may mostlly take time.  F/u if worsening.      "

## 2020-03-02 ENCOUNTER — NURSE TRIAGE (OUTPATIENT)
Dept: NURSING | Facility: CLINIC | Age: 30
End: 2020-03-02

## 2020-03-02 ENCOUNTER — OFFICE VISIT (OUTPATIENT)
Dept: FAMILY MEDICINE | Facility: CLINIC | Age: 30
End: 2020-03-02
Payer: MEDICAID

## 2020-03-02 VITALS
OXYGEN SATURATION: 100 % | HEART RATE: 102 BPM | HEIGHT: 62 IN | WEIGHT: 122.6 LBS | DIASTOLIC BLOOD PRESSURE: 72 MMHG | SYSTOLIC BLOOD PRESSURE: 104 MMHG | BODY MASS INDEX: 22.56 KG/M2 | RESPIRATION RATE: 16 BRPM | TEMPERATURE: 98.7 F

## 2020-03-02 DIAGNOSIS — F43.10 PTSD (POST-TRAUMATIC STRESS DISORDER): Primary | ICD-10-CM

## 2020-03-02 DIAGNOSIS — T30.0 BURN: ICD-10-CM

## 2020-03-02 DIAGNOSIS — F33.2 SEVERE EPISODE OF RECURRENT MAJOR DEPRESSIVE DISORDER, WITHOUT PSYCHOTIC FEATURES (H): ICD-10-CM

## 2020-03-02 DIAGNOSIS — F41.1 GENERALIZED ANXIETY DISORDER: ICD-10-CM

## 2020-03-02 PROCEDURE — 99213 OFFICE O/P EST LOW 20 MIN: CPT | Performed by: NURSE PRACTITIONER

## 2020-03-02 RX ORDER — ALPRAZOLAM 0.25 MG
0.25 TABLET ORAL 3 TIMES DAILY PRN
Qty: 30 TABLET | Refills: 0 | Status: SHIPPED | OUTPATIENT
Start: 2020-03-02 | End: 2020-12-03

## 2020-03-02 RX ORDER — HYDROXYZINE HYDROCHLORIDE 25 MG/1
25 TABLET, FILM COATED ORAL
Qty: 30 TABLET | Refills: 0 | Status: SHIPPED | OUTPATIENT
Start: 2020-03-02 | End: 2020-09-24

## 2020-03-02 RX ORDER — SERTRALINE HYDROCHLORIDE 25 MG/1
25 TABLET, FILM COATED ORAL DAILY
Qty: 30 TABLET | Refills: 0 | Status: SHIPPED | OUTPATIENT
Start: 2020-03-02 | End: 2020-09-24

## 2020-03-02 ASSESSMENT — ANXIETY QUESTIONNAIRES
7. FEELING AFRAID AS IF SOMETHING AWFUL MIGHT HAPPEN: SEVERAL DAYS
IF YOU CHECKED OFF ANY PROBLEMS ON THIS QUESTIONNAIRE, HOW DIFFICULT HAVE THESE PROBLEMS MADE IT FOR YOU TO DO YOUR WORK, TAKE CARE OF THINGS AT HOME, OR GET ALONG WITH OTHER PEOPLE: EXTREMELY DIFFICULT
6. BECOMING EASILY ANNOYED OR IRRITABLE: NEARLY EVERY DAY
1. FEELING NERVOUS, ANXIOUS, OR ON EDGE: NEARLY EVERY DAY
5. BEING SO RESTLESS THAT IT IS HARD TO SIT STILL: NEARLY EVERY DAY
3. WORRYING TOO MUCH ABOUT DIFFERENT THINGS: NEARLY EVERY DAY
GAD7 TOTAL SCORE: 19
2. NOT BEING ABLE TO STOP OR CONTROL WORRYING: NEARLY EVERY DAY

## 2020-03-02 ASSESSMENT — MIFFLIN-ST. JEOR: SCORE: 1234.36

## 2020-03-02 ASSESSMENT — PATIENT HEALTH QUESTIONNAIRE - PHQ9
5. POOR APPETITE OR OVEREATING: NEARLY EVERY DAY
SUM OF ALL RESPONSES TO PHQ QUESTIONS 1-9: 27

## 2020-03-02 NOTE — NURSING NOTE
"Chief Complaint   Patient presents with     Burn     from curling iron     Depression Screening       Initial /72 (BP Location: Right arm, Patient Position: Chair, Cuff Size: Adult Regular)   Pulse 102   Temp 98.7  F (37.1  C) (Tympanic)   Resp 16   Ht 1.575 m (5' 2\")   Wt 55.6 kg (122 lb 9.6 oz)   SpO2 100%   BMI 22.42 kg/m   Estimated body mass index is 22.42 kg/m  as calculated from the following:    Height as of this encounter: 1.575 m (5' 2\").    Weight as of this encounter: 55.6 kg (122 lb 9.6 oz).    Patient presents to the clinic using No DME    Health Maintenance that is potentially due pending provider review:  CARLOS Combs MA  2:03 PM 3/2/2020  .      "
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
## 2020-03-02 NOTE — PATIENT INSTRUCTIONS
Medications prescribed for anxiety and depression.     Follow-up in 1 month (or 3 weeks) to establish care with a PCP and make sure medications are working.  -Make appt on your way out.

## 2020-03-03 ASSESSMENT — ENCOUNTER SYMPTOMS
SLEEP DISTURBANCE: 1
NERVOUS/ANXIOUS: 1
CONSTITUTIONAL NEGATIVE: 1

## 2020-03-03 ASSESSMENT — ANXIETY QUESTIONNAIRES: GAD7 TOTAL SCORE: 19

## 2020-04-17 ENCOUNTER — TELEPHONE (OUTPATIENT)
Dept: OBGYN | Facility: CLINIC | Age: 30
End: 2020-04-17

## 2020-04-17 NOTE — TELEPHONE ENCOUNTER
Patient is a 30 year old, . Last PAP was 2018- per the recommendations noted on result notes,     Notes Recorded by Sandra Lundberg RN on 2018 at 10:16 AM   Please send patient normal pap letter ([id number]).   Pap due in 3 years.   Sandra Lundberg, RN - Pap Tracking Nurse     RN called and spoke to patient. She has already been scheduled in [place], no need for an appointment with us. Thankful for the call back. RN did advise she wasn't yet due for her PAP. She verbalized understanding.     Stacey Simmons, RN on 2020 at 10:23 AM

## 2020-04-17 NOTE — TELEPHONE ENCOUNTER
Reason for Call:  Other appointment    Detailed comments: patient is calling to request an in clinic appointment for sti check, pap. Please follow up with patient.     Phone Number Patient can be reached at: Cell number on file:    Telephone Information:   Mobile [phone]   Mobile Not on file.       Best Time: anytime     Can we leave a detailed message on this number? YES    Call taken on 4/17/2020 at 9:56 AM by Ashley Castellon

## 2020-04-17 NOTE — PROGRESS NOTES
Subjective     Stacie Daniels is a 30 year old female who presents to clinic today for the following health issues:    HPI     *Patient presenting for STD screening. Partner unfaithful. She notes she had some itching/burning a couple weeks ago, thought she just had yeast infection so tx with OTC yeast medication w/o relief.  LMP: Unsure; thinks she is due to get it next week    Michelle Alonso CMA        Patient Active Problem List   Diagnosis     CARDIOVASCULAR SCREENING; LDL GOAL LESS THAN 160     ADHD (attention deficit hyperactivity disorder)     Generalized anxiety disorder     Sciatica     Hyperemesis arising during pregnancy     PTSD (post-traumatic stress disorder)     Prenatal care, subsequent pregnancy     Fetal arrhythmia before the onset of labor     S/P tubal ligation     Past Surgical History:   Procedure Laterality Date     FOREIGN BODY REMOVAL      metal from her eye as a child     LAPAROTOMY MINI, TUBAL LIGATION (POST PARTUM), COMBINED Bilateral 12/22/2018    Procedure: COMBINED LAPAROTOMY MINI, TUBAL LIGATION (POST PARTUM);  Surgeon: Helen Smallwood MD;  Location: [place] OR       Social History     Tobacco Use     Smoking status: Former Smoker     Packs/day: 0.00     Smokeless tobacco: Never Used   Substance Use Topics     Alcohol use: No     Comment: rare-quit with pregnancy     Family History   Problem Relation Age of Onset     C.A.D. Father      Cancer Father         lymphoma     Prostate Cancer Father      Cancer Maternal Grandmother         rectal     Depression Maternal Grandmother      Cerebrovascular Disease Maternal Grandmother      Prostate Cancer Maternal Grandfather      Breast Cancer Paternal Grandmother      Hypertension Paternal Grandmother      Depression Paternal Grandmother      Thyroid Disease Paternal Grandmother      Hypertension Paternal Grandfather      Macular Degeneration Paternal Grandfather      Depression Mother      Bipolar Disorder Mother      Depression Sister       "Bipolar Disorder Sister      Depression Sister      Schizophrenia Maternal Uncle      Suicide Paternal Aunt      Macular Degeneration Other      Diabetes No family hx of      Glaucoma No family hx of          Current Outpatient Medications   Medication Sig Dispense Refill     ALPRAZolam (XANAX) 0.25 MG tablet Take 1 tablet (0.25 mg) by mouth 3 times daily as needed for anxiety 30 tablet 0     hydrOXYzine (ATARAX) 25 MG tablet Take 1 tablet (25 mg) by mouth at bedtime as needed, may repeat once for anxiety (1 tablet at bedtime, may repeat x 1 if needed) 30 tablet 0     sertraline (ZOLOFT) 25 MG tablet Take 1 tablet (25 mg) by mouth daily 30 tablet 0     BP Readings from Last 3 Encounters:   04/20/20 117/69   03/02/20 104/72   12/05/19 106/54    Wt Readings from Last 3 Encounters:   04/20/20 56.2 kg (124 lb)   03/02/20 55.6 kg (122 lb 9.6 oz)   12/05/19 59.9 kg (132 lb)                      Reviewed and updated as needed this visit by Provider         Review of Systems   ROS COMP: Constitutional, HEENT, cardiovascular, pulmonary, gi and gu systems are negative, except as otherwise noted.      Objective    /69   Pulse 80   Temp 99.3  F (37.4  C) (Tympanic)   Resp 14   Ht 1.575 m (5' 2\")   Wt 56.2 kg (124 lb)   LMP  (LMP Unknown)   SpO2 99%   Breastfeeding No   BMI 22.68 kg/m    Body mass index is 22.68 kg/m .  Physical Exam   GENERAL: healthy, alert and no distress    Diagnostic Test Results:  Labs reviewed in Epic        Assessment & Plan     1. Screen for STD (sexually transmitted disease)  No symptoms currently, but general screening is advised.    - HIV Antigen Antibody Combo  - Treponema Abs w Reflex to RPR and Titer  - NEISSERIA GONORRHOEA PCR  - CHLAMYDIA TRACHOMATIS PCR  - Wet prep  - Hepatitis A antibody IgM  - Hepatitis B surface antigen  - Hepatitis C antibody     2.  Bacterial vaginosis.   The Wet Prep was positive for Bacterial Vaginosis.  The vagina normally contains mostly \"good\" bacteria, " "and fewer \"harmful\" bacteria.  BV develops when there is an increase in harmful bacteria.  This infection is easily treated with an antibiotic, flagyl, which has been sent to your pharmacy.  Do not drink alcohol with this medication.          Return in about 1 week (around 4/27/2020) for as needed (if symptoms change).    Elizabeth Guadarrama PA-C  [facility]      "

## 2020-04-20 ENCOUNTER — OFFICE VISIT (OUTPATIENT)
Dept: FAMILY MEDICINE | Facility: CLINIC | Age: 30
End: 2020-04-20
Payer: MEDICAID

## 2020-04-20 VITALS
DIASTOLIC BLOOD PRESSURE: 69 MMHG | RESPIRATION RATE: 14 BRPM | OXYGEN SATURATION: 99 % | HEIGHT: 62 IN | HEART RATE: 80 BPM | WEIGHT: 124 LBS | TEMPERATURE: 99.3 F | SYSTOLIC BLOOD PRESSURE: 117 MMHG | BODY MASS INDEX: 22.82 KG/M2

## 2020-04-20 DIAGNOSIS — Z11.3 SCREEN FOR STD (SEXUALLY TRANSMITTED DISEASE): Primary | ICD-10-CM

## 2020-04-20 DIAGNOSIS — B96.89 BACTERIAL VAGINOSIS: ICD-10-CM

## 2020-04-20 DIAGNOSIS — N76.0 BACTERIAL VAGINOSIS: ICD-10-CM

## 2020-04-20 LAB
HAV IGM SERPL QL IA: NONREACTIVE
HBV SURFACE AG SERPL QL IA: NONREACTIVE
HCV AB SERPL QL IA: NONREACTIVE
HIV 1+2 AB+HIV1 P24 AG SERPL QL IA: NONREACTIVE
SPECIMEN SOURCE: ABNORMAL
T PALLIDUM AB SER QL: NONREACTIVE
WET PREP SPEC: ABNORMAL

## 2020-04-20 PROCEDURE — 87591 N.GONORRHOEAE DNA AMP PROB: CPT | Performed by: PHYSICIAN ASSISTANT

## 2020-04-20 PROCEDURE — 87340 HEPATITIS B SURFACE AG IA: CPT | Performed by: PHYSICIAN ASSISTANT

## 2020-04-20 PROCEDURE — 99213 OFFICE O/P EST LOW 20 MIN: CPT | Performed by: PHYSICIAN ASSISTANT

## 2020-04-20 PROCEDURE — 86709 HEPATITIS A IGM ANTIBODY: CPT | Performed by: PHYSICIAN ASSISTANT

## 2020-04-20 PROCEDURE — 86780 TREPONEMA PALLIDUM: CPT | Performed by: PHYSICIAN ASSISTANT

## 2020-04-20 PROCEDURE — 87210 SMEAR WET MOUNT SALINE/INK: CPT | Performed by: PHYSICIAN ASSISTANT

## 2020-04-20 PROCEDURE — 36415 COLL VENOUS BLD VENIPUNCTURE: CPT | Performed by: PHYSICIAN ASSISTANT

## 2020-04-20 PROCEDURE — 87389 HIV-1 AG W/HIV-1&-2 AB AG IA: CPT | Performed by: PHYSICIAN ASSISTANT

## 2020-04-20 PROCEDURE — 86803 HEPATITIS C AB TEST: CPT | Performed by: PHYSICIAN ASSISTANT

## 2020-04-20 PROCEDURE — 87491 CHLMYD TRACH DNA AMP PROBE: CPT | Performed by: PHYSICIAN ASSISTANT

## 2020-04-20 RX ORDER — METRONIDAZOLE 500 MG/1
500 TABLET ORAL 2 TIMES DAILY
Qty: 14 TABLET | Refills: 0 | Status: SHIPPED | OUTPATIENT
Start: 2020-04-20 | End: 2020-04-27

## 2020-04-20 ASSESSMENT — MIFFLIN-ST. JEOR: SCORE: 1240.71

## 2020-04-21 LAB
C TRACH DNA SPEC QL NAA+PROBE: NEGATIVE
N GONORRHOEA DNA SPEC QL NAA+PROBE: NEGATIVE
SPECIMEN SOURCE: NORMAL
SPECIMEN SOURCE: NORMAL

## 2020-05-06 ENCOUNTER — TELEPHONE (OUTPATIENT)
Dept: FAMILY MEDICINE | Facility: CLINIC | Age: 30
End: 2020-05-06

## 2020-05-06 DIAGNOSIS — B96.89 BACTERIAL VAGINOSIS: Primary | ICD-10-CM

## 2020-05-06 DIAGNOSIS — N76.0 BACTERIAL VAGINOSIS: Primary | ICD-10-CM

## 2020-05-06 NOTE — TELEPHONE ENCOUNTER
Pt had reaction to flagyl, looking for alternative med. Had appt with Chiara 4/20.    Thank you,    Sandra [place], [place]

## 2020-05-06 NOTE — TELEPHONE ENCOUNTER
Patient notified and expressed understanding. I did add this to her allergy list.    Janette Chun RN

## 2020-05-06 NOTE — TELEPHONE ENCOUNTER
Patient states she saw Elizabeth Guadarrama on 4/20 and started the Flagyl on 4/23. She then started getting a pin point rash on the inside of her thighs and was getting super dizzy from this medication to the point she had to lay down each day and take a nap which is not like her. She took it twice a day for 4 days but had to stop it was making her so sick. She states her symptoms started to get better but now the odor and whitish/yellowish discharge is coming back along with the itching so she knows she has the same bacterial vaginosis again. She was wondering if there is something different she could get prescribed to help take care of this other than the Flagyl? Thank you!    Janette Chun RN

## 2020-05-06 NOTE — TELEPHONE ENCOUNTER
Please place Flagyl on allergy list.  New prescription sent for clindamycin gel.  Should apply 1 applicator full into vagina nightly for 7 days.  Prescription sent to [facility] pharmacy.    Petra [facility]

## 2020-06-08 ENCOUNTER — HOSPITAL ENCOUNTER (EMERGENCY)
Facility: CLINIC | Age: 30
Discharge: HOME | End: 2020-06-08
Attending: NURSE PRACTITIONER | Admitting: NURSE PRACTITIONER
Payer: COMMERCIAL

## 2020-06-08 ENCOUNTER — APPOINTMENT (OUTPATIENT)
Dept: CT IMAGING | Facility: CLINIC | Age: 30
End: 2020-06-08
Attending: NURSE PRACTITIONER
Payer: COMMERCIAL

## 2020-06-08 VITALS
TEMPERATURE: 98.6 F | DIASTOLIC BLOOD PRESSURE: 76 MMHG | SYSTOLIC BLOOD PRESSURE: 109 MMHG | OXYGEN SATURATION: 98 % | RESPIRATION RATE: 18 BRPM | HEART RATE: 73 BPM

## 2020-06-08 DIAGNOSIS — R10.31 RLQ ABDOMINAL PAIN: ICD-10-CM

## 2020-06-08 LAB
ALBUMIN SERPL-MCNC: 4 G/DL (ref 3.4–5)
ALBUMIN UR-MCNC: NEGATIVE MG/DL
ALP SERPL-CCNC: 64 U/L (ref 40–150)
ALT SERPL W P-5'-P-CCNC: 15 U/L (ref 0–50)
ANION GAP SERPL CALCULATED.3IONS-SCNC: 3 MMOL/L (ref 3–14)
APPEARANCE UR: ABNORMAL
AST SERPL W P-5'-P-CCNC: 9 U/L (ref 0–45)
BASOPHILS # BLD AUTO: 0.1 10E9/L (ref 0–0.2)
BASOPHILS NFR BLD AUTO: 0.7 %
BILIRUB SERPL-MCNC: 0.6 MG/DL (ref 0.2–1.3)
BILIRUB UR QL STRIP: ABNORMAL
BUN SERPL-MCNC: 8 MG/DL (ref 7–30)
CALCIUM SERPL-MCNC: 9.1 MG/DL (ref 8.5–10.1)
CHLORIDE SERPL-SCNC: 108 MMOL/L (ref 94–109)
CO2 SERPL-SCNC: 31 MMOL/L (ref 20–32)
COLOR UR AUTO: YELLOW
CREAT SERPL-MCNC: 0.77 MG/DL (ref 0.52–1.04)
DIFFERENTIAL METHOD BLD: ABNORMAL
EOSINOPHIL NFR BLD AUTO: 0.7 %
ERYTHROCYTE [DISTWIDTH] IN BLOOD BY AUTOMATED COUNT: 12 % (ref 10–15)
GFR SERPL CREATININE-BSD FRML MDRD: >90 ML/MIN/{1.73_M2}
GLUCOSE SERPL-MCNC: 91 MG/DL (ref 70–99)
GLUCOSE UR STRIP-MCNC: NEGATIVE MG/DL
HCT VFR BLD AUTO: 41.9 % (ref 35–47)
HGB BLD-MCNC: 13.8 G/DL (ref 11.7–15.7)
HGB UR QL STRIP: NEGATIVE
IMM GRANULOCYTES # BLD: 0.1 10E9/L (ref 0–0.4)
IMM GRANULOCYTES NFR BLD: 0.5 %
KETONES UR STRIP-MCNC: 5 MG/DL
LEUKOCYTE ESTERASE UR QL STRIP: NEGATIVE
LIPASE SERPL-CCNC: 158 U/L (ref 73–393)
LYMPHOCYTES # BLD AUTO: 2.4 10E9/L (ref 0.8–5.3)
LYMPHOCYTES NFR BLD AUTO: 19.7 %
MCH RBC QN AUTO: 31.2 PG (ref 26.5–33)
MCHC RBC AUTO-ENTMCNC: 32.9 G/DL (ref 31.5–36.5)
MCV RBC AUTO: 95 FL (ref 78–100)
MONOCYTES # BLD AUTO: 0.7 10E9/L (ref 0–1.3)
MONOCYTES NFR BLD AUTO: 5.8 %
MUCOUS THREADS #/AREA URNS LPF: PRESENT /LPF
NEUTROPHILS # BLD AUTO: 8.8 10E9/L (ref 1.6–8.3)
NEUTROPHILS NFR BLD AUTO: 72.6 %
NITRATE UR QL: NEGATIVE
NRBC # BLD AUTO: 0 10*3/UL
NRBC BLD AUTO-RTO: 0 /100
PH UR STRIP: 6 PH (ref 5–7)
PLATELET # BLD AUTO: 201 10E9/L (ref 150–450)
POTASSIUM SERPL-SCNC: 3.5 MMOL/L (ref 3.4–5.3)
PROT SERPL-MCNC: 7.3 G/DL (ref 6.8–8.8)
RBC # BLD AUTO: 4.42 10E12/L (ref 3.8–5.2)
RBC #/AREA URNS AUTO: 1 /HPF (ref 0–2)
SODIUM SERPL-SCNC: 142 MMOL/L (ref 133–144)
SOURCE: ABNORMAL
SP GR UR STRIP: 1.03 (ref 1–1.03)
SQUAMOUS #/AREA URNS AUTO: 14 /HPF (ref 0–1)
UROBILINOGEN UR STRIP-MCNC: 2 MG/DL (ref 0–2)
WBC # BLD AUTO: 12.2 10E9/L (ref 4–11)
WBC #/AREA URNS AUTO: 1 /HPF (ref 0–5)

## 2020-06-08 PROCEDURE — 99285 EMERGENCY DEPT VISIT HI MDM: CPT | Mod: 25 | Performed by: NURSE PRACTITIONER

## 2020-06-08 PROCEDURE — 25000128 H RX IP 250 OP 636: Performed by: NURSE PRACTITIONER

## 2020-06-08 PROCEDURE — 99284 EMERGENCY DEPT VISIT MOD MDM: CPT | Mod: Z6 | Performed by: NURSE PRACTITIONER

## 2020-06-08 PROCEDURE — 96374 THER/PROPH/DIAG INJ IV PUSH: CPT | Performed by: NURSE PRACTITIONER

## 2020-06-08 PROCEDURE — 80053 COMPREHEN METABOLIC PANEL: CPT | Performed by: NURSE PRACTITIONER

## 2020-06-08 PROCEDURE — 96361 HYDRATE IV INFUSION ADD-ON: CPT | Mod: 59 | Performed by: NURSE PRACTITIONER

## 2020-06-08 PROCEDURE — 85025 COMPLETE CBC W/AUTO DIFF WBC: CPT | Performed by: NURSE PRACTITIONER

## 2020-06-08 PROCEDURE — 81001 URINALYSIS AUTO W/SCOPE: CPT | Performed by: EMERGENCY MEDICINE

## 2020-06-08 PROCEDURE — 96375 TX/PRO/DX INJ NEW DRUG ADDON: CPT | Performed by: NURSE PRACTITIONER

## 2020-06-08 PROCEDURE — 25800030 ZZH RX IP 258 OP 636: Performed by: NURSE PRACTITIONER

## 2020-06-08 PROCEDURE — 83690 ASSAY OF LIPASE: CPT | Performed by: NURSE PRACTITIONER

## 2020-06-08 PROCEDURE — 74177 CT ABD & PELVIS W/CONTRAST: CPT

## 2020-06-08 RX ORDER — ONDANSETRON 2 MG/ML
4 INJECTION INTRAMUSCULAR; INTRAVENOUS EVERY 30 MIN PRN
Status: DISCONTINUED | OUTPATIENT
Start: 2020-06-08 | End: 2020-06-08 | Stop reason: HOSPADM

## 2020-06-08 RX ORDER — KETOROLAC TROMETHAMINE 30 MG/ML
30 INJECTION, SOLUTION INTRAMUSCULAR; INTRAVENOUS ONCE
Status: COMPLETED | OUTPATIENT
Start: 2020-06-08 | End: 2020-06-08

## 2020-06-08 RX ORDER — IOPAMIDOL 755 MG/ML
500 INJECTION, SOLUTION INTRAVASCULAR ONCE
Status: COMPLETED | OUTPATIENT
Start: 2020-06-08 | End: 2020-06-08

## 2020-06-08 RX ORDER — SODIUM CHLORIDE 9 MG/ML
1000 INJECTION, SOLUTION INTRAVENOUS CONTINUOUS
Status: DISCONTINUED | OUTPATIENT
Start: 2020-06-08 | End: 2020-06-08 | Stop reason: HOSPADM

## 2020-06-08 RX ADMIN — SODIUM CHLORIDE 1000 ML: 9 INJECTION, SOLUTION INTRAVENOUS at 17:17

## 2020-06-08 RX ADMIN — ONDANSETRON 4 MG: 2 INJECTION INTRAMUSCULAR; INTRAVENOUS at 17:17

## 2020-06-08 RX ADMIN — IOPAMIDOL 65 ML: 755 INJECTION, SOLUTION INTRAVENOUS at 18:37

## 2020-06-08 RX ADMIN — KETOROLAC TROMETHAMINE 30 MG: 30 INJECTION, SOLUTION INTRAMUSCULAR at 17:17

## 2020-06-08 ASSESSMENT — ENCOUNTER SYMPTOMS
RESPIRATORY NEGATIVE: 1
CONSTIPATION: 0
HEMATOLOGIC/LYMPHATIC NEGATIVE: 1
SLEEP DISTURBANCE: 0
EYE REDNESS: 0
CHILLS: 1
FLANK PAIN: 0
CARDIOVASCULAR NEGATIVE: 1
ABDOMINAL DISTENTION: 0
BACK PAIN: 0
NEUROLOGICAL NEGATIVE: 1

## 2020-06-08 NOTE — ED TRIAGE NOTES
Presents to ED with lower abdominal pain that started at 1000 this am. States pain radiates back to right flank. Has been vomiting.

## 2020-06-08 NOTE — ED AVS SNAPSHOT
[facility] Emergency Department  [address] [place] [address]  Phone:  [phone]  Fax:  [phone]                                    Stacie Daniels   MRN: [id number]    Department:  [facility] Emergency Department   Date of Visit:  6/8/2020           After Visit Summary Signature Page    I have received my discharge instructions, and my questions have been answered. I have discussed any challenges I see with this plan with the nurse or doctor.    ..........................................................................................................................................  Patient/Patient Representative Signature      ..........................................................................................................................................  Patient Representative Print Name and Relationship to Patient    ..................................................               ................................................  Date                                   Time    ..........................................................................................................................................  Reviewed by Signature/Title    ...................................................              ..............................................  Date                                               Time          22EPIC Rev 08/18

## 2020-06-08 NOTE — ED PROVIDER NOTES
History     Chief Complaint   Patient presents with     Abdominal Pain     HPI  Stacie Daniels is a 30 year old female who presents with worsening nausea, vomiting, and RLQ abdominal pain since yesterday. Reports pain has steadily worsened since 1000 this morning and not tolerating fluids.  Reports pain is aching pressure and 8/10 worsening with lying flat and ambulating.  Has not had fever, diarrhea, constipation, vaginal discharge, no hx of ovarian cysts.     Last ate yesterday evening.     PCP; No Ref-Primary, Physician     Allergies:  Allergies   Allergen Reactions     Augmentin Hives     Avocado Hives and Swelling     Flagyl [Metronidazole] Dizziness and Rash       Problem List:    Patient Active Problem List    Diagnosis Date Noted     S/P tubal ligation 12/22/2018     Priority: Medium     Fetal arrhythmia before the onset of labor 12/20/2018     Priority: Medium     Prenatal care, subsequent pregnancy 05/23/2018     Priority: Medium     FOB- Crystal Santiago       PTSD (post-traumatic stress disorder) 10/07/2013     Priority: Medium     Hyperemesis arising during pregnancy 05/01/2013     Priority: Medium     Has PICC line  Daily zofran and fluids       Sciatica 04/12/2013     Priority: Medium     Generalized anxiety disorder 03/28/2013     Priority: Medium     CARDIOVASCULAR SCREENING; LDL GOAL LESS THAN 160 02/08/2012     Priority: Medium     ADHD (attention deficit hyperactivity disorder) 01/25/2005     Priority: Medium     Off Concerta          Past Medical History:    Past Medical History:   Diagnosis Date     Anxiety      Chickenpox      Febrile convulsion (H)      Major depressive disorder, recurrent episode, mild (H)      Ulcer        Past Surgical History:    Past Surgical History:   Procedure Laterality Date     FOREIGN BODY REMOVAL      metal from her eye as a child     LAPAROTOMY MINI, TUBAL LIGATION (POST PARTUM), COMBINED Bilateral 12/22/2018    Procedure: COMBINED LAPAROTOMY MINI, TUBAL LIGATION  (POST PARTUM);  Surgeon: Helen Smallwood MD;  Location: [facility] OR       Family History:    Family History   Problem Relation Age of Onset     C.A.D. Father      Cancer Father         lymphoma     Prostate Cancer Father      Cancer Maternal Grandmother         rectal     Depression Maternal Grandmother      Cerebrovascular Disease Maternal Grandmother      Prostate Cancer Maternal Grandfather      Breast Cancer Paternal Grandmother      Hypertension Paternal Grandmother      Depression Paternal Grandmother      Thyroid Disease Paternal Grandmother      Hypertension Paternal Grandfather      Macular Degeneration Paternal Grandfather      Depression Mother      Bipolar Disorder Mother      Depression Sister      Bipolar Disorder Sister      Depression Sister      Schizophrenia Maternal Uncle      Suicide Paternal Aunt      Macular Degeneration Other      Diabetes No family hx of      Glaucoma No family hx of        Social History:  Marital Status:  Single [1]  Social History     Tobacco Use     Smoking status: Former Smoker     Packs/day: 0.00     Smokeless tobacco: Never Used   Substance Use Topics     Alcohol use: No     Comment: rare-quit with pregnancy     Drug use: No        Medications:    ALPRAZolam (XANAX) 0.25 MG tablet  clindamycin (CLEOCIN 1 DOSE) 2 % vaginal cream  hydrOXYzine (ATARAX) 25 MG tablet  sertraline (ZOLOFT) 25 MG tablet          Review of Systems   Constitutional: Positive for chills.   HENT: Negative.    Eyes: Negative for redness.   Respiratory: Negative.    Cardiovascular: Negative.    Gastrointestinal: Negative for abdominal distention and constipation.   Genitourinary: Negative for flank pain.   Musculoskeletal: Negative for back pain.   Skin: Negative.    Neurological: Negative.    Hematological: Negative.    Psychiatric/Behavioral: Negative for sleep disturbance.       Physical Exam   BP: 128/85  Pulse: 79  Temp: 98.6  F (37  C)  Resp: 18  SpO2: 98 %      Physical Exam  Vitals signs and  nursing note reviewed.   Constitutional:       Appearance: She is well-developed.      Comments: Appears in pain sitting on bed leaning over holding stomach   HENT:      Head: Normocephalic and atraumatic.   Cardiovascular:      Rate and Rhythm: Normal rate and regular rhythm.      Heart sounds: Normal heart sounds.   Pulmonary:      Effort: Pulmonary effort is normal.      Breath sounds: Normal breath sounds.   Abdominal:      General: Abdomen is flat. Bowel sounds are normal.      Palpations: Abdomen is soft.      Tenderness: There is abdominal tenderness in the right lower quadrant and periumbilical area.      Hernia: No hernia is present.       Skin:     General: Skin is warm and dry.      Capillary Refill: Capillary refill takes less than 2 seconds.   Neurological:      General: No focal deficit present.      Mental Status: She is alert.         ED Course    DIff: appendicitis, mesenteric adenitis, urolithiasis, ovarian cyst, ovarian torsion        Procedures            No results found for this or any previous visit (from the past 24 hour(s)).    Medications   0.9% sodium chloride BOLUS (0 mLs Intravenous Stopped 6/8/20 1856)   ketorolac (TORADOL) injection 30 mg (30 mg Intravenous Given 6/8/20 1717)   iohexol (OMNIPAQUE) solution 50 mL (50 mLs Oral Given 6/8/20 1722)   sodium chloride (PF) 0.9% PF flush 100 mL (60 mLs Intracatheter Given 6/8/20 1837)   sodium chloride (PF) 0.9% PF flush 3 mL (3 mLs Intravenous Given 6/8/20 1836)   iopamidol (ISOVUE-370) solution 500 mL (65 mLs Intravenous Given 6/8/20 1837)       Assessments & Plan (with Medical Decision Making)  29 yo female with worsening RLQ abdominal pain for 24 hours underwent labs and CT scan which did not show acute causes for her pain. Patient had marked relief of pain post toradol and tolerating fluids.  Discussed undergoing transvaginal ultrasound evaluate for torsion of right ovary and patient declined as she was feeling much improved and requested  to be discharged. Discussed with patient if pain returns recommend eval for ovarian torsion as possible cause of pain. She reports she is monogamous nad no concern for PID.     I have reviewed the nursing notes.    I have reviewed the findings, diagnosis, plan and need for follow up with the patient.    Discharge Medication List as of 6/8/2020  7:23 PM          Final diagnoses:   RLQ abdominal pain       6/8/2020   [facility] EMERGENCY DEPARTMENT     Maria Antonia Britton, STEPHANIE CNP  06/17/20 [id number]

## 2020-06-09 NOTE — DISCHARGE INSTRUCTIONS
Your CT scan was normal.  No signs of appendicitis, no ovarian cysts.  Do not have an overt cause for your pain.  Glad that it is relieved after the Toradol injection.  If it returns or seems to be worsening come back and we will need to proceed with a vaginal ultrasound to further evaluate your ovaries.

## 2020-06-09 NOTE — ED NOTES
Reviewed discharge instruction, verbalized understanding. No questions or concerns. IV removed. Meds reviewed. VS reassessed.

## 2020-06-16 ENCOUNTER — TELEPHONE (OUTPATIENT)
Dept: FAMILY MEDICINE | Facility: CLINIC | Age: 30
End: 2020-06-16

## 2020-06-16 DIAGNOSIS — B96.89 BACTERIAL VAGINOSIS: ICD-10-CM

## 2020-06-16 DIAGNOSIS — N76.0 BACTERIAL VAGINOSIS: ICD-10-CM

## 2020-06-16 NOTE — TELEPHONE ENCOUNTER
Spoke with [facility] staff, confirmed that patient never picked up 5/6/20 Rx, Cost is 82.00.  Medication is profiled, patient can  if needed.  Writer called patient regarding RX-no answer, phone rang and rang  Jaqueline Pond RN

## 2020-06-16 NOTE — TELEPHONE ENCOUNTER
Pending Prescriptions:                       Disp   Refills    clindamycin (CLEOCIN 1 DOSE) 2 % vaginal *5 g    0            Sig: One applicator full into vagina nightly for 7           days    [place], [place] - [address] [place] [id number]  Phone: [phone] Fax: [phone] Alternate Fax: [phone], [phone]    Kay Ramos on 6/16/2020 at 8:50 AM

## 2020-09-24 ENCOUNTER — OFFICE VISIT (OUTPATIENT)
Dept: FAMILY MEDICINE | Facility: CLINIC | Age: 30
End: 2020-09-24
Payer: COMMERCIAL

## 2020-09-24 VITALS
HEART RATE: 96 BPM | RESPIRATION RATE: 18 BRPM | BODY MASS INDEX: 22.68 KG/M2 | OXYGEN SATURATION: 100 % | DIASTOLIC BLOOD PRESSURE: 60 MMHG | HEIGHT: 62 IN | SYSTOLIC BLOOD PRESSURE: 102 MMHG | TEMPERATURE: 99.4 F | WEIGHT: 123.25 LBS
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
## 2020-09-24 PROCEDURE — 99213 OFFICE O/P EST LOW 20 MIN: CPT | Performed by: NURSE PRACTITIONER

## 2020-09-24 RX ORDER — DOXYCYCLINE 100 MG/1
200 CAPSULE ORAL ONCE
Qty: 2 CAPSULE | Refills: 0 | Status: SHIPPED | OUTPATIENT
Start: 2020-09-24 | End: 2020-09-24

## 2020-09-24 ASSESSMENT — MIFFLIN-ST. JEOR: SCORE: 1237.31

## 2020-09-24 NOTE — PROGRESS NOTES
"Subjective     Stacie Daniels is a 30 year old female who presents to clinic today for the following health issues:    HPI       Concern - RASH; TICK BITE  Onset: 3-4 days  Description: left arm rash that appears to come & go on its own; tick bite on right upper thigh  Intensity: mild  Progression of Symptoms:  Has appeared twice and then has gone away on its own.  Accompanying Signs & Symptoms: believes tick is still embedded under skin.  Previous history of similar problem: history of tick bites with treatment of antibiotic.  Precipitating factors:        Worsened by: nothing  Alleviating factors:        Improved by: no ointments, creams  Therapies tried and outcome: None    Above HPI reviewed. Additionally, presents me with a photo of the tick which does appear to be a nymph deer tick. No current fever or rash. Removed tick 2 days ago.        Review of Systems   Constitutional, HEENT, cardiovascular, pulmonary, gi and gu systems are negative, except as otherwise noted.      Objective    /60   Pulse 96   Temp 99.4  F (37.4  C) (Tympanic)   Resp 18   Ht 1.575 m (5' 2\")   Wt 55.9 kg (123 lb 4 oz)   LMP 09/20/2020 (Approximate)   SpO2 100%   Breastfeeding No   BMI 22.54 kg/m    Body mass index is 22.54 kg/m .  Physical Exam  Vitals signs and nursing note reviewed.   Constitutional:       General: She is not in acute distress.     Appearance: Normal appearance.   HENT:      Head: Normocephalic and atraumatic.      Mouth/Throat:      Mouth: Mucous membranes are moist.   Neck:      Musculoskeletal: Neck supple.   Cardiovascular:      Rate and Rhythm: Normal rate.   Pulmonary:      Effort: Pulmonary effort is normal.   Skin:     General: Skin is warm and dry.      Comments: Tiny scabbed lesion with central punctate noted to the right anterior upper thigh. No evidence of secondary infection.   Neurological:      General: No focal deficit present.      Mental Status: She is alert.   Psychiatric:         " Mood and Affect: Mood normal.         Behavior: Behavior normal.                    Assessment & Plan     Tick bite, initial encounter  Meets criteria for prophylactic dose of doxy. Advised to monitor symptoms over the next month and let me know if she has any problems.  - doxycycline hyclate (VIBRAMYCIN) 100 MG capsule; Take 2 capsules (200 mg) by mouth once for 1 dose       Patient Instructions   Take both tablets of doxycycline together.  Watch over the next month for fever, rash, body aches and let me know if this occurs.      Return in about 1 month (around 10/24/2020) for worsening or continued symptoms.    STEPHANIE Segura [facility]

## 2020-09-24 NOTE — PATIENT INSTRUCTIONS
Take both tablets of doxycycline together.  Watch over the next month for fever, rash, body aches and let me know if this occurs.

## 2020-12-03 ENCOUNTER — OFFICE VISIT (OUTPATIENT)
Dept: FAMILY MEDICINE | Facility: CLINIC | Age: 30
End: 2020-12-03
Payer: COMMERCIAL

## 2020-12-03 DIAGNOSIS — F41.1 GENERALIZED ANXIETY DISORDER: Primary | ICD-10-CM

## 2020-12-03 DIAGNOSIS — F33.1 MODERATE EPISODE OF RECURRENT MAJOR DEPRESSIVE DISORDER (H): ICD-10-CM

## 2020-12-03 PROCEDURE — 99214 OFFICE O/P EST MOD 30 MIN: CPT | Mod: 95 | Performed by: NURSE PRACTITIONER

## 2020-12-03 RX ORDER — HYDROXYZINE HYDROCHLORIDE 25 MG/1
25 TABLET, FILM COATED ORAL EVERY 8 HOURS PRN
Qty: 60 TABLET | Refills: 1 | Status: SHIPPED | OUTPATIENT
Start: 2020-12-03 | End: 2021-03-31

## 2020-12-03 RX ORDER — LORAZEPAM 0.5 MG/1
TABLET ORAL
Qty: 30 TABLET | Refills: 0 | Status: SHIPPED | OUTPATIENT
Start: 2020-12-03 | End: 2021-06-24

## 2020-12-03 ASSESSMENT — ANXIETY QUESTIONNAIRES
GAD7 TOTAL SCORE: 19
2. NOT BEING ABLE TO STOP OR CONTROL WORRYING: NEARLY EVERY DAY
1. FEELING NERVOUS, ANXIOUS, OR ON EDGE: NEARLY EVERY DAY
3. WORRYING TOO MUCH ABOUT DIFFERENT THINGS: NEARLY EVERY DAY
IF YOU CHECKED OFF ANY PROBLEMS ON THIS QUESTIONNAIRE, HOW DIFFICULT HAVE THESE PROBLEMS MADE IT FOR YOU TO DO YOUR WORK, TAKE CARE OF THINGS AT HOME, OR GET ALONG WITH OTHER PEOPLE: EXTREMELY DIFFICULT
6. BECOMING EASILY ANNOYED OR IRRITABLE: SEVERAL DAYS
5. BEING SO RESTLESS THAT IT IS HARD TO SIT STILL: NEARLY EVERY DAY
7. FEELING AFRAID AS IF SOMETHING AWFUL MIGHT HAPPEN: NEARLY EVERY DAY

## 2020-12-03 ASSESSMENT — PATIENT HEALTH QUESTIONNAIRE - PHQ9
SUM OF ALL RESPONSES TO PHQ QUESTIONS 1-9: 24
5. POOR APPETITE OR OVEREATING: NEARLY EVERY DAY

## 2020-12-03 ASSESSMENT — COLUMBIA-SUICIDE SEVERITY RATING SCALE - C-SSRS
1. WITHIN THE PAST MONTH, HAVE YOU WISHED YOU WERE DEAD OR WISHED YOU COULD GO TO SLEEP AND NOT WAKE UP?: YES
6. HAVE YOU EVER DONE ANYTHING, STARTED TO DO ANYTHING, OR PREPARED TO DO ANYTHING TO END YOUR LIFE?: NO
2. IN THE PAST MONTH, HAVE YOU ACTUALLY HAD ANY THOUGHTS OF KILLING YOURSELF?: NO

## 2020-12-03 NOTE — PROGRESS NOTES
"Stacie Daniels is a 30 year old female who is being evaluated via a billable telephone visit.      The patient has been notified of following:     \"This telephone visit will be conducted via a call between you and your physician/provider. We have found that certain health care needs can be provided without the need for a physical exam.  This service lets us provide the care you need with a short phone conversation.  If a prescription is necessary we can send it directly to your pharmacy.  If lab work is needed we can place an order for that and you can then stop by our lab to have the test done at a later time.    Telephone visits are billed at different rates depending on your insurance coverage. During this emergency period, for some insurers they may be billed the same as an in-person visit.  Please reach out to your insurance provider with any questions.    If during the course of the call the physician/provider feels a telephone visit is not appropriate, you will not be charged for this service.\"    Patient has given verbal consent for Telephone visit?  Yes    What phone number would you like to be contacted at? [phone]    How would you like to obtain your AVS? Mail a copy    Subjective     Stacie Daniels is a 30 year old female who presents via phone visit today for the following health issues:    HPI  Depression Followup and anxiety    How are you doing with your depression since your last visit? Has not taken medications for two weeks. Patient had a baby two years ago and had post partum depresson    Are you having other symptoms that might be associated with depression? Yes:  sleeping issues    Have you had a significant life event?  Job Concerns and relationships     Are you feeling anxious or having panic attacks?   Yes:  happening every other day    Do you have any concerns with your use of alcohol or other drugs? No  Above HPI reviewed. Additionally, has had ongoing depression and anxiety " symptoms for years but has inconsistently taken medication. Has occasional suicidal ideation however no intent and no plan. Is currently working with a therapist out of [place] with CBT twice weekly, currently virtually. No medications right now. Last prescribed sertraline in March but never took regularly. Out of hydroxyzine. Sometimes uses Xanax for panic attacks during the day. Awakens at night frequently with panic attacks and would like something for sleep, however does have a young daughter at home who frequently gets up overnight.      Social History     Tobacco Use     Smoking status: Former Smoker     Packs/day: 0.00     Smokeless tobacco: Never Used   Substance Use Topics     Alcohol use: No     Comment: rarely     Drug use: No     PHQ 3/8/2019 3/2/2020 12/3/2020   PHQ-9 Total Score 2 27 24   Q9: Thoughts of better off dead/self-harm past 2 weeks Not at all Nearly every day More than half the days     CHARMAINE-7 SCORE 3/8/2019 3/2/2020 12/3/2020   Total Score - - -   Total Score 0 19 19     Last PHQ-9 12/3/2020   1.  Little interest or pleasure in doing things 2   2.  Feeling down, depressed, or hopeless 3   3.  Trouble falling or staying asleep, or sleeping too much 3   4.  Feeling tired or having little energy 3   5.  Poor appetite or overeating 3   6.  Feeling bad about yourself 3   7.  Trouble concentrating 3   8.  Moving slowly or restless 2   Q9: Thoughts of better off dead/self-harm past 2 weeks 2   PHQ-9 Total Score 24   Difficulty at work, home, or with people Extremely dIfficult     CHARMAINE-7  12/3/2020   1. Feeling nervous, anxious, or on edge 3   2. Not being able to stop or control worrying 3   3. Worrying too much about different things 3   4. Trouble relaxing 3   5. Being so restless that it is hard to sit still 3   6. Becoming easily annoyed or irritable 1   7. Feeling afraid, as if something awful might happen 3   CHARMAINE-7 Total Score 19   If you checked any problems, how difficult have they made it  for you to do your work, take care of things at home, or get along with other people? Extremely difficult     In the past two weeks have you had thoughts of suicide or self-harm?  Yes  In the past two weeks have you thought of a plan or intent to harm yourself? No.  Do you have concerns about your personal safety or the safety of others?   No    Suicide Assessment Five-step Evaluation and Treatment (SAFE-T)      How many servings of fruits and vegetables do you eat daily?  2-3    On average, how many sweetened beverages do you drink each day (Examples: soda, juice, sweet tea, etc.  Do NOT count diet or artificially sweetened beverages)?   0    How many days per week do you exercise enough to make your heart beat faster? 3 or less    How many minutes a day do you exercise enough to make your heart beat faster? 9 or less    How many days per week do you miss taking your medication? 0         Review of Systems   Constitutional, HEENT, cardiovascular, pulmonary, gi and gu systems are negative, except as otherwise noted.       Objective          Vitals:  No vitals were obtained today due to virtual visit.    healthy, alert and no distress  PSYCH: Alert and oriented times 3; coherent speech, normal   rate and volume, able to articulate logical thoughts, able   to abstract reason, no tangential thoughts, no hallucinations   or delusions  Her affect is normal  RESP: No cough, no audible wheezing, able to talk in full sentences  Remainder of exam unable to be completed due to telephone visits            Assessment/Plan:    Assessment & Plan     Generalized anxiety disorder  Does have frequent panic attacks, and has them overnight as well. We discussed trying hydroxyzine first at night, and if ineffective can take a lorazepam. Can use half lorazepam during the day for panic attacks.  Did discuss habit forming quality of lorazepam and that this is not a long term solution and that the goal would be to have her symptoms  controlled with selective serotonin reuptake inhibitor with goal of being off benzo completely. She will continue therapy.  - LORazepam (ATIVAN) 0.5 MG tablet; Take one half to one tablet every 8 hours as needed for anxiety.  - hydrOXYzine (ATARAX) 25 MG tablet; Take 1 tablet (25 mg) by mouth every 8 hours as needed for anxiety or other (sleep)    Moderate episode of recurrent major depressive disorder (H)  Start sertraline, check in with me in one month. Advised to take consistently. Discussed side effect profile, not abruptly stopping medication.  - sertraline (ZOLOFT) 50 MG tablet; Take one half tablet by mouth daily for one week then increase to a full tablet daily.      Depression Screening Follow Up    PHQ 12/3/2020   PHQ-9 Total Score 24   Q9: Thoughts of better off dead/self-harm past 2 weeks More than half the days     Last PHQ-9 12/3/2020   1.  Little interest or pleasure in doing things 2   2.  Feeling down, depressed, or hopeless 3   3.  Trouble falling or staying asleep, or sleeping too much 3   4.  Feeling tired or having little energy 3   5.  Poor appetite or overeating 3   6.  Feeling bad about yourself 3   7.  Trouble concentrating 3   8.  Moving slowly or restless 2   Q9: Thoughts of better off dead/self-harm past 2 weeks 2   PHQ-9 Total Score 24   Difficulty at work, home, or with people Extremely dIfficult                Follow Up      Follow Up Actions Taken  Crisis resource information provided in the After Visit Summary    Discussed the following ways the patient can remain in a safe environment:  be around others      Patient Instructions   Possible side effects of antidepressants/anxiety meds, including but not limited to GI upset, disrupted sleep, loss of libido, worsening of mood or even possible risk of increased suicidal thoughts.   Often some of these things if not severe will improve after 1-2 weeks on medications but some may not see effects for 3-4 weeks,  if tolerable patients  should continue meds and see if there is improvement.  If symptoms are intolerable or for any suicidal thoughts the medication should be stopped immediately and contact the clinic.       These medications should be used for 6-9 months before stopping, to avoid rebound symptoms.   Contact the clinic if having any problems tolerating these medications.  Take the medication daily and do not stop the medication abruptly.  Follow up with me in one month for recheck.    Can use one half lorazepam (Ativan) during the day as needed for anxiety, can use a full tablet for sleep if hydroxyzine is not effective.    BENZODIAZEPINE DISCHARGE INSTRUCTIONS:  You have been prescribed a benzodiazepine, lorazepam.  Side effects of this medication includes sedation, unsteadiness, lightheadedness/dizziness, confusion, depression, or weakness.  These medications are addictive with long-term use, so please use sparingly.      Additionally, benzodiazepines are medications that are sedating (will make you sleepy), so do not drive or operate machinery while taking this medication.  Avoid alcohol or other sedating medications, such as narcotics, while taking benzodiazepines due to risk for increased sedation and difficulty breathing that could be life threatening.     If you are on this medication for a long period of time, please talk to your primary care provider before abruptly discontinuing this medication due to risk of withdrawal.    Mental Health Crisis Numbers:  [place] after hours Crisis Line      [phone]     Options For Deaf + Hard of Hearing   [phone]    Text [place] to 532168 24/7 Crisis Texting line; average response time is 39 seconds    [employer] Lifeline (Fight the epidemic of trans suicide)  3-183-164-3481     https://www.[place] [facility]if[facility].org/    [facility], [place], operated by [facility]   [phone]   Available 24 hours per day/7 days a week.  [facility] mental health crisis services for children, adults, and families in [facility].  Crisis assessment, intervention, and stabilization services.     [facility], [place], operated by [facility]   [phone]    [facility]  [phone]    [facility]   (new 2019) [place] [facility] 24/7  [phone]     Other Counties:    [place]-  Adults  [phone]   Children [phone]   [place]-  Adults [phone]  Children [phone]     [place]- [place] 5 pm - 9 pm   1-670.468.7882    National Suicide Prevention LifeLine      7-153-035-TALK (2560)     Crisis [place] Services:  [facility]   [phone]  [facility]  [phone], option #1  [facility]     [phone]          Return in about 1 month (around 1/3/2021) for Recheck of symptoms.    STEPHANIE Segura [facility]    Phone call duration:  8 minutes

## 2020-12-03 NOTE — PATIENT INSTRUCTIONS
Possible side effects of antidepressants/anxiety meds, including but not limited to GI upset, disrupted sleep, loss of libido, worsening of mood or even possible risk of increased suicidal thoughts.   Often some of these things if not severe will improve after 1-2 weeks on medications but some may not see effects for 3-4 weeks,  if tolerable patients should continue meds and see if there is improvement.  If symptoms are intolerable or for any suicidal thoughts the medication should be stopped immediately and contact the clinic.       These medications should be used for 6-9 months before stopping, to avoid rebound symptoms.   Contact the clinic if having any problems tolerating these medications.  Take the medication daily and do not stop the medication abruptly.  Follow up with me in one month for recheck.    Can use one half lorazepam (Ativan) during the day as needed for anxiety, can use a full tablet for sleep if hydroxyzine is not effective.    BENZODIAZEPINE DISCHARGE INSTRUCTIONS:  You have been prescribed a benzodiazepine, lorazepam.  Side effects of this medication includes sedation, unsteadiness, lightheadedness/dizziness, confusion, depression, or weakness.  These medications are addictive with long-term use, so please use sparingly.      Additionally, benzodiazepines are medications that are sedating (will make you sleepy), so do not drive or operate machinery while taking this medication.  Avoid alcohol or other sedating medications, such as narcotics, while taking benzodiazepines due to risk for increased sedation and difficulty breathing that could be life threatening.     If you are on this medication for a long period of time, please talk to your primary care provider before abruptly discontinuing this medication due to risk of withdrawal.    Mental Health Crisis Numbers:  [place] after hours Crisis Line      [phone]     Options For Deaf + Hard of Hearing   [phone]    Text [place] to  [phone] 24/7 Crisis Texting line; average response time is 39 seconds    Trans Lifeline (Fight the epidemic of trans suicide)  2-728-338-8878     https://www.translifeline.org/    [facility]   [phone]   Available 24 hours per day/7 days a week.  Mobile mental health crisis services for children, adults, and families in [facility]. Crisis assessment, intervention, and stabilization services.     [facility]   [phone]    [facility]  [phone]    [facility]   (new 2019) Mobile Jossy Team 24/7  [phone]     Other Counties:    [place]-  Adults  [phone]   Children [phone]   [place]-  Adults [phone]  Children [phone]     [facility]- ETTA 5 pm - 9 pm   [phone]    National Suicide Prevention LifeLine      6-113-824-TALK 8255)     Crisis Mobile Services:  [facility] [place]   [phone]  [facility]  [phone], option #1  [facility]     [phone]

## 2020-12-04 ASSESSMENT — ANXIETY QUESTIONNAIRES: GAD7 TOTAL SCORE: 19

## 2021-01-01 NOTE — DISCHARGE INSTRUCTIONS
2330 Report received using SBAR format from MARGUERITE Galicia RN, infant received in heated isolette on air temp control, on Sue monitor for C/R/Oximeter with alarms set and on.  0200 Nursing assessment completed, VSS, HRR with audible murmur, abdomen round, soft to palpation, NGT placement verified with air bolus, weight done, attempted to po feed infant who took 13ml, very disorganized, remainder of feeding delivered via NGT, infant repositioned. 0530 VSS, NG fed.  7615 Report given using SBAR format to DEVYN Headley RN. Eating Tips for Morning Sickness   Hyperemesis Gravidarum  During pregnancy, you need to eat enough food to meet your needs and the needs of your baby. Severe nausea and vomiting (hyperemesis) may lead to weight loss and dehydration (too little fluid in the body).   Follow these tips to help control nausea.   1. Eat 6 to 8 small meals in a day, about two hours apart.  2. Before rising in the morning, eat a small amount of dry food. Choose from the list below.  ? soda crackers (saltines)  ? dry toast with jelly  ? breadsticks  ? dry cereal  ? rice cakes  ? pretzels  ? plain potatoes, rice or noodles  ? plain low-fat cookies or cake  3. Avoid liquids with meals. Drink liquids 30 to 60 minutes before or after eating. Sip slowly.  4. Foods and drinks should be cool or at room temperature. Try:  ? flavored gelatin  ? sherbet, sorbet or Popsicles  ? carbonated (fizzy) drinks  ? ice cubes made from juice.  5. Avoid hot drinks and foods.  6. Avoid drinks with caffeine (coffee, tea, cola drinks). They may increase stomach acid.  7. Avoid very sweet, hot or spicy foods.  8. Avoid high-fat foods such as butter, margarine, mayonnaise, espinoza, gravies, pie crust, pastries and fried foods. They take longer to leave the stomach.  9. Avoid strong food odors such as fish, cabbage or broccoli. Avoid cooking odors by eating food you do not have to cook.  10. Do not lie down after eating. Rest sitting up for an hour after meals.  11. Take your prenatal vitamins with food in the evening. Tell your doctor if you cannot take them.  12. Nausea is often gone by midday. You may eat more food in the late afternoon, supper and mid-evening. Find the times best for you.  Keep a food diary to help you find foods that you can eat without problems. Try any food that appeals to you.  Menu Planning Guidelines     [employer]. Reprinted with permission.  Food groups Foods recommended  Foods that may cause distress    Soups Low-fat broth-based and cream  soups made with allowed foods. All other soups.    Meats and substitutes  (Six or more ounces daily) All lean, tender meats, poultry or fish. All should be baked, broiled or boiled. Boiled egg. Low-fat or fat-free cheeses. Fried meat, poultry or fish; highly seasoned, cured or smoked meat, poultry or fish (i.e., corned beef, luncheon meat, frankfurters, sausages, sardines, anchovies, espinoza and strong flavored cheese). Peanut butter.    Fruits (Two or more servings daily; include a vitamin C source daily) Fruit juices, canned fruits, grapefruit and orange sections (without membrane). Other fresh and dried fruits, if tolerated.     Vegetables  (Three or more servings daily) Vegetable juices, cooked vegetables (i.e., asparagus, green or wax beans, beets, carrots, peas, pumpkin, winter squash, spinach and mushrooms). Raw vegetables if tolerated.  Gas-forming vegetables (i.e., dried peas and beans, corn, broccoli, onions, cauliflower, [place] sprouts, cucumbers, cabbage, turnip, rutabagas, sauerkraut, green peppers).    Bread, cereal, potato, pasta and grains  (Six or more servings daily) Enriched breads and cereals, plain crackers, potatoes, enriched rice, barley, noodles, spaghetti, macaroni and other pastas. Very coarse cereals such as bran; seeds in or on breads, rolls and crackers; breads made with nuts or dried fruits; fried breads and pastries such as doughnuts; fried potatoes, fried rice, wild rice, seasoned rice and pasta mixes.    Dessert fats Low-fat versions of cakes, cookies, custard, pudding, ice cream, frozen yogurt sherbet; ice pops, gelatin, frozen fruit bars, sorbet. Desserts containing salad dressings, nuts, coconut; high-fat desserts.    Milk and milk products (Four or more cups daily) Fat-free and low-fat milk products. Whole milk, cream.    Beverages   (Four or more cups daily) Water, decaffeinated coffee and tea, fruit drinks, caffeine-free carbonated beverages, weak tea, lemonade, sports drinks.  All caffeine-containing beverages (i.e., coffee, strong tea, cocoa, cola); alcoholic beverages.    Condiments and sweets Iodized salt, flavorings, low-fat gravies and sauces, herbs and spices as tolerated; sugar, syrup, honey, jelly, seedless jam, hard candies and marshmallows. Strongly flavored seasonings and condiments (i.e., catsup, pepper, barbecue sauce, chili sauce, chili pepper, horseradish, garlic, mustard and vinegar), olives, pickles, nuts, chocolate candy.    For informational purposes only. Not to replace the advice of your health care provider.   Copyright   2006 [facility]. All rights reserved. [employer] 641001 - REV 09/15.

## 2021-01-01 NOTE — PROGRESS NOTES
NEONATOLOGY DAILY PROGRESS NOTE      Subjective     Girl MICKEY Farooq is a 13 day old old former Gestational Age: 26w4d, date of Birth 2020, birthweight 800 g female infant admitted 2021 11:01 AM and is a   baby.    Corrected Gestational Age: 28w3d      Girl MICKEY Farooq requires Critical Care for Prematurity and RDS. With the need for continuous cardiorespiratory monitoring, monitoring of feeding tolerance, and temperature control.    Overnight Events  Multiple B/DS, receiving PRBCs this am      Objective     Vital signs reviewed  Visit Vitals  BP (!) 94/74 (BP Location: RLE - Right lower extremity)   Pulse 156   Temp 98.4 °F (36.9 °C) (Axillary)   Resp (!) 64   Ht 13.39\" (34 cm)   Wt (!) 870 g   HC 24 cm (9.45\")   SpO2 88%   BMI 7.53 kg/m²        Current Weight (!) 870 g (21 0000)   Most recent weights:  Weight    21 0000 21 0000 21 0000 21 0000   Weight: (!) 840 g (!) 850 g (!) 870 g (!) 870 g     Weight Change Since yesterday: Weight change: 0 g     Weight Change Since Birth: 9%       Apnea/Bradycardia/Desaturation in last 24 hours    Patient Vitals for the past 24 hrs:   Apnea (secs) Bradycardia Rate Event SpO2 Intervention Activity Prior to Event   21 0640 -- -- 59 Oxygen Sleeping   21 0622 12 secs -- 49 Oxygen;Tactile stimulation, mild Sleeping   21 0521 -- -- 33 -- --   21 0516 -- 86 58 Oxygen;Tactile stimulation, mild Sleeping   21 0307 16 secs 77 47 Oxygen;Tactile stimulation, moderate;Other (Comment) Feeding   21 0250 -- -- 65 Oxygen;Reposition Sleeping   21 -- -- 68 Oxygen;Reposition Sleeping   21 -- -- 51 Oxygen Sleeping   217 -- -- 70 Oxygen Feeding   21 -- -- 62 Oxygen Sleeping   21 -- 64 30 Oxygen;Tactile stimulation, moderate Feeding   21 184 20 secs -- 48  This is a recent snapshot of the patient's [facility] medical record.  For current drug dose and complete information and questions, call [phone] or In Basket pool, fv home infusion ([id number])  CSN Number:  [id number]       Oxygen;Tactile stimulation, moderate;Reposition Sleeping   07/31/21 1710 -- -- 60 Oxygen Sleeping   07/31/21 1557 -- -- 72 Oxygen;Tactile stimulation, mild Sleeping   07/31/21 1224 12 secs -- 59 Oxygen;Tactile stimulation, mild Sleeping;Feeding           Lines,Tube Drains    Intubation  Necessity/Indication: Not Intubated  Readiness for Extubation discussed - N/A 2021    Urinary Catheter  Necessity/Indication: Not Catheterized  Need for Urinary Catheter discussed - N/A 2021    Central Line  Type of Central Line:   UVC 7/19 - 7/21  UAC 7/19 - 7/22  PICC line 7/21 -   Necessity/Indication: Fluid/Blood Products, Limited Peripheral Access and TPN  Need for Central Line discussed YES 2021    Chest Tube  No 2021    Fluids  Based off a Dosing Weight: 800 g    Last 24H:     Intake/Output       07/31 0700 - 08/01 0659 08/01 0700 - 08/02 0659    NG/GT (mL/kg) 177 (203.45) 12 (13.79)    TPN (mL/kg) 53.76 (61.79) 4.38 (5.03)    Total Intake(mL/kg) 230.76 (265.24) 16.38 (18.83)    Urine (mL/kg/hr) 91 (4.36) 13 (3.42)    Emesis (mL/kg/hr)      Stool (mL/kg/hr) 0 (0)     Total Output(mL/kg) 91 (104.6) 13 (14.94)    Net +139.76 +3.38          Urine Occurrence 1 x     Stool Occurrence 2 x           Urine: Urine Occurrence  Min: 1  Max: 1 Last Stool: 1 (08/01/21 0538)    Labs (Last 24 hours)  Recent Results (from the past 24 hour(s))   BLOOD GAS, CAPILLARY WITH COOXIMETRY - RESPIRATORY    Collection Time: 08/01/21  5:16 AM   Result Value Ref Range    BASE EXCESS / DEFICIT, CAPILLARY - RESPIRATORY -2 -2 - 3 mmol/L    CONDITION - RESPIRATORY PRAVIN ACPC 25/20/+6     HCO3, CAPILLARY - RESPIRATORY 25 22 - 28 mmol/L    CARBOXYHEMOGLOBIN - RESPIRATORY 1.7 1.5 - 15.0 %    PCO2, CAPILLARY - RESPIRATORY 50 (H) 32 - 45 mm Hg    PH, CAPILLARY - RESPIRATORY 7.30 (L) 7.35 - 7.45 Units    PO2, CAPILLARY - RESPIRATORY 35 34 - 45 mm Hg    O2 SATURATION, CAPILLARY - RESPIRATORY 75 (L) >=95 %    FIO2 - RESPIRATORY 30 %    HEMOGLOBIN -  RESPIRATORY 10.8 (L) 13.5 - 21.5 g/dL    METHEMOGLOBIN - RESPIRATORY 0.5 <=1.6 %    SITE - RESPIRATORY Capillary     TEMPERATURE - RESPIRATORY 37.0 degrees    OXYHEMOGLOBIN, CAPILLARY - RESPIRATORY 73.1 %   POTASSIUM - RESPIRATORY    Collection Time: 08/01/21  5:16 AM   Result Value Ref Range    POTASSIUM - RESPIRATORY 4.6 3.5 - 6.0 mmol/L   SODIUM - RESPIRATORY    Collection Time: 08/01/21  5:16 AM   Result Value Ref Range    SODIUM - RESPIRATORY 135 135 - 145 mmol/L   CALCIUM, IONIZED - RESPIRATORY    Collection Time: 08/01/21  5:16 AM   Result Value Ref Range    CALCIUM, IONIZED - RESPIRATORY 1.31 (H) 1.15 - 1.29 mmol/L   GLUCOSE - RESPIRATORY    Collection Time: 08/01/21  5:16 AM   Result Value Ref Range    GLUCOSE - RESPIRATORY 45 (LL) 54 - 117 mg/dL   CHLORIDE - RESPIRATORY    Collection Time: 08/01/21  5:16 AM   Result Value Ref Range    CHLORIDE - RESPIRATORY 102 98 - 107 mmol/L   LACTIC ACID, CAPILLARY - RESPIRATORY    Collection Time: 08/01/21  5:16 AM   Result Value Ref Range    LACTIC ACID, CAPILLARY - RESPIRATORY 1.1 <1.6 mmol/L   GLUCOSE, BEDSIDE - POINT OF CARE    Collection Time: 08/01/21  5:22 AM   Result Value Ref Range    GLUCOSE, BEDSIDE - POINT OF CARE 36 (LL) 54 - 117 mg/dL   GLUCOSE, BEDSIDE - POINT OF CARE    Collection Time: 08/01/21  6:54 AM   Result Value Ref Range    GLUCOSE, BEDSIDE - POINT OF CARE 66 54 - 117 mg/dL   Prepare Red Blood Cells (in mL): 9 mLs    Collection Time: 08/01/21  7:53 AM   Result Value Ref Range    UNIT BLOOD TYPE O Neg     ISBT BLOOD TYPE 9500     BLOOD EXPIRATION DATE [id number]     UNIT NUMBER [id number]     DISPENSE STATUS Issued     PRODUCT ID Red Blood Cells     PRODUCT CODE [id number]     PRODUCT DESCRIPTION RBC AS-1 IRR LR     CROSSMATCH RESULT Compatible     ISSUE DATE/TIME [id number]           LABS REVIEWED    Medications  Current Facility-Administered Medications   Medication   • furosemide 0.8 mg IV syringe   • sodium chloride 17.2 mEq,  potassium CHLORIDE 5 mEq, calcium gluconate 2.511 mEq, heparin 250 Units in 500 mL of Dextrose 10% coni continuous infusion   • fat emulsion 20 % 4.3 mL /pediatric infusion   • parenteral nutrition /pediatric (less than 5 kg)   • sodium chloride (PF) 0.9 % injection 0.5 mL   • caffeine citrate (CAFCIT) 4 mg in D5W /peds IV syringe   • petrolatum (white)-mineral oil ophthalmic ointment 1 application   • glycerin pediatric suppository 0.25 suppository          Vitals    24 Hour Range   Temperature   Temp  Min: 97.7 °F (36.5 °C)  Max: 99 °F (37.2 °C)   Pulse   Pulse  Min: 125  Max: 179   Respiratory   Resp  Min: 24  Max: 92   Blood Pressure   BP  Min: 68/46  Max: 94/74   Pulse Oximetry    SpO2  Min: 74 %  Max: 100 %       Physical Exam    General:  No acute distress.    Eye:  Pupils are equal, round and reactive to light, Red reflex (deferred).    HENT:  Normocephalic, Anterior fontanelle open/soft/flat, Ears normally set and rotated, Palate intact, nondysmorphic.    Neck:  No thyromegaly, clavicles intact.    Respiratory:  Lungs are clear to auscultation, Respirations are non-labored, Breath sounds are equal.    Cardiovascular:  Normal rate, Regular rhythm, Normal peripheral perfusion.    Gastrointestinal:  Soft, Non-distended, No organomegaly, Anus patent.    Genitourinary:  Normal genitalia for age and sex.    Musculoskeletal     No deformity.     Integumentary:  Pink, No pallor, No rash, no abornormal nevi or hemangiomas noted   Neurologic:  Normal deep tendon reflexes, No focal deficits, normal tone for gestational age.        Assessment & Plan  by system     Former Gestational Age: 26w4d female infant, now corrected to 28w3d        Fluids, Electrolytes and Nutrition:  Assessment:  infant  Serum Electrolytes - Normal.     - NPO: NO  - NG/OG: YES  - TF: 156 ml/kg/day  - PO% - 0%  - Feeds - MBM/DBM - 12 mL Q3h NG  26 cynthia/oz Prolact +6    - :  Small 1x episode of hematemesis this  morning - workup benign.  Likely old blood delivery or due to blood swallowed from intubation for surfactant.  No further episodes.  2 feeding held this morning  - 7/23 at 9 PM: Small 1 x episode of blood mixed residual, after brownish spit up. Abd soft. Plan: NPO, TFI = 160 ml/kg/d, AXR, CBC, CRP, Blood c/s, OG lavage x 1, Vit K IM.   7/24: One episode of blood tinged emesis OVN, given 1 dose of Famotidine, Vitamin K, NPO, KUB looks WNL, 'CPAP belly'. No further episodes.    Plan:    - Tolerating feeds - increase feeds to 14ml q3hrs BM/DBM 26 cynthia/oz  - KVO for PICC line  -   - continue to monitor clinically    Respiratory System:  Assessment: Respiratory Distress Syndrome (RDS)    Pt with RDS on admission. Pt placed on CPAP  Surfactant was given x1.  She escalated to PRAVIN NIPPV due to multiple apneas on 7/20    Mode: PRAVIN      - Current support settings:   Vent Settings Last Value   Mode  Noninvasive (08/01/21 0706)     FiO2 30 % (08/01/21 0706)   Rate 25 (08/01/21 0706)   Total PIP 20 cmH20 (08/01/21 0706)   PEEP/CPAP 6 cm H20 (08/01/21 0706)     - Surfactant: Given: 1st dose, date: 7/19, time: 1300    Plan:  - Blood gas frequency: Q24h   - Keep PEEP at 6  - Wean Respiratory Support as tolerated if applicable   - Clinically monitor    Apnea/Bradycardia/Desaturations:  Assessment:  Apnea of Prematurity    - Last Apnea:     - Intervention: Oxygen (O2 increased till recovered) (08/01/21 0640)  - Last Desaturation: Event SpO2: 59 (08/01/21 0640)  Desaturation (secs): 14 secs (08/01/21 0640)  - Intervention: Intervention: Oxygen (O2 increased till recovered) (08/01/21 0640)    - Last Bradycardia: 15 secs (08/01/21 0516);  - Interventions: Intervention: Oxygen (O2 increased till recovered) (08/01/21 0640)    - Activity Prior to Event: Sleeping    - Caffeine Loading dose 20mg/kg/dose - Yes - Date: 7/19   - Caffeine maintenance 10mg/kg qday - Yes    Plan:  - - Continuous cardiorespiratory monitoring  - Continue  maintenance caffeine 10 mg/kg  - Monitor clinically for spells     Cardio Vascular System:   Assessment: No active cardiac issues      Plan:  - - Continuous Cardiopulmonary monitoring  - Echo on DOL 3:   Patent foramen ovale, small, left to right.  No patent ductus arteriosus identified.  No evidence of pulmonary hypertension.      Gastrointestinal System:  Assessment: None  -     Plan:  - No active issues     Renal System:  Assessment: None     Plan:  - No active issues    Hematology:  Assessment: None     Baby's blood type is O Rh Positive;  [place]: N/A  Maternal blood type is   Information for the patient's mother:  Inocencia Farooq [[id number]]   O Rh Positive     Last Bilirubin:   Recent Labs   Lab 07/31/21  0521 07/29/21  0525 07/28/21  0521 07/26/21  0543   BILIRUBIN 2.8* 5.5* 9.2* 6.3*      Last Hg:   HGB (g/dL)   Date Value   2021 11.4 (L)     Phototherapy 7/20 - 7/23, 7/28 - 7/31    Number of PRBC transfusions: 1  Most Recent transfusion: 7/27/21 8/1: Receiving PRBCs today, then lasix x 1    147 107 29 53   5.6 30 0.78      12.4 11.4 185    30.8        Plan:  - Off phototherapy  - Hyperbilirubinemia screening - bili on 08/2    Infectious Disease:  Assessment: No concerns for infection at this time   - Received 36 hours of empiric antibiotics at birth.  Blood culture negative  - 7/25 Repeat Blood c/s due to multiple ABD - Blood culture negative    Plan:  - Follow clinically    Endocrine System:  Assessment: Intial hypoglycemia - resolved  - Required 3x d10%W bolus after birth.    - Glucose now WNL  8/1: One hypoglycemic episode this am, improved after feeding. Will monitor closely, consider sepsis w/u if it persists.    Plan:  - Maintain GIR to stablize blood sugars      Central Nervous System:  Assessment: At risk for IVH  - 1 week HUS - no IVH    Plan:   - Routine HUS at 1month of life. 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  Infant on TPN at time of lab draw.    Plan:  Repeat NBS done .  Repeat again when infant is off TPN       Therapies:   PT/OT consults per unit guidelines            Screenings & Procedures   Immunizations:   There is no immunization history for the selected administration types on file for this patient.   Hearing Test:    [place] ROP Eye Exam Needed?: Yes (21 2100) Yes  Car Seat Screen:    CCHD Screening:   Screening complete:    Right hand reading %:    Foot reading %:    CHD:    [place] State Screen- date drawn (most recent results): 21 (21)  Last 3 results: 21 (21)  Synagis Candidate:        Parents plan to follow-up as an outpatient following discharge home with - TBD  Update Parents routinely.  I have spoken with the nursing staff and the healthcare team and reviewed findings and plan of management.      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

## 2021-01-14 ENCOUNTER — HOSPITAL ENCOUNTER (EMERGENCY)
Facility: CLINIC | Age: 31
Discharge: HOME | End: 2021-01-14
Attending: EMERGENCY MEDICINE | Admitting: EMERGENCY MEDICINE
Payer: COMMERCIAL

## 2021-01-14 VITALS
RESPIRATION RATE: 18 BRPM | TEMPERATURE: 98.1 F | DIASTOLIC BLOOD PRESSURE: 64 MMHG | SYSTOLIC BLOOD PRESSURE: 110 MMHG | HEART RATE: 94 BPM | OXYGEN SATURATION: 98 %

## 2021-01-14 DIAGNOSIS — G24.3 SPASMODIC TORTICOLLIS: ICD-10-CM

## 2021-01-14 PROCEDURE — 96374 THER/PROPH/DIAG INJ IV PUSH: CPT | Performed by: EMERGENCY MEDICINE

## 2021-01-14 PROCEDURE — 99285 EMERGENCY DEPT VISIT HI MDM: CPT | Performed by: EMERGENCY MEDICINE

## 2021-01-14 PROCEDURE — 99285 EMERGENCY DEPT VISIT HI MDM: CPT | Mod: 25 | Performed by: EMERGENCY MEDICINE

## 2021-01-14 PROCEDURE — 250N000011 HC RX IP 250 OP 636: Performed by: EMERGENCY MEDICINE

## 2021-01-14 PROCEDURE — 96375 TX/PRO/DX INJ NEW DRUG ADDON: CPT | Performed by: EMERGENCY MEDICINE

## 2021-01-14 RX ORDER — KETOROLAC TROMETHAMINE 30 MG/ML
30 INJECTION, SOLUTION INTRAMUSCULAR; INTRAVENOUS ONCE
Status: COMPLETED | OUTPATIENT
Start: 2021-01-14 | End: 2021-01-14

## 2021-01-14 RX ORDER — ACETAMINOPHEN 500 MG
500 TABLET ORAL EVERY 6 HOURS PRN
COMMUNITY

## 2021-01-14 RX ORDER — DIAZEPAM 10 MG/2ML
5 INJECTION, SOLUTION INTRAMUSCULAR; INTRAVENOUS ONCE
Status: COMPLETED | OUTPATIENT
Start: 2021-01-14 | End: 2021-01-14

## 2021-01-14 RX ORDER — DIAZEPAM 5 MG
5 TABLET ORAL EVERY 6 HOURS PRN
Qty: 20 TABLET | Refills: 0 | Status: SHIPPED | OUTPATIENT
Start: 2021-01-14 | End: 2023-02-21

## 2021-01-14 RX ADMIN — KETOROLAC TROMETHAMINE 30 MG: 30 INJECTION, SOLUTION INTRAMUSCULAR at 14:14

## 2021-01-14 RX ADMIN — DIAZEPAM 5 MG: 5 INJECTION, SOLUTION INTRAMUSCULAR; INTRAVENOUS at 14:16

## 2021-01-14 NOTE — ED PROVIDER NOTES
History     Chief Complaint   Patient presents with     Torticollis     HPI  Stacie Daniels is a 30 year old female who presents with 2 to 3 days of worsening left-sided neck pain.  No injury or fall.  Pain is gotten to the point where she cannot turn her head past a certain point due to discomfort.  When the pain is severe her left arm feels numb.  She currently denies paresthesias and does not think the arm is weak.  She is not had a headache with this.  Denies any ear pain, runny nose or sore throat.  No mid to low back pain.  No leg pain or weakness.  No saddle paresthesia or loss of bowel or bladder.  No recent illness and no fever or chills.  No rashes over the area.  Is been using a heating pad without much improvement.    Allergies:  Allergies   Allergen Reactions     Augmentin Hives     Avocado Hives and Swelling     Flagyl [Metronidazole] Dizziness and Rash       Problem List:    Patient Active Problem List    Diagnosis Date Noted     S/P tubal ligation 12/22/2018     Priority: Medium     Fetal arrhythmia before the onset of labor 12/20/2018     Priority: Medium     Prenatal care, subsequent pregnancy 05/23/2018     Priority: Medium     FOB- Crystal Kraolamide       PTSD (post-traumatic stress disorder) 10/07/2013     Priority: Medium     Hyperemesis arising during pregnancy 05/01/2013     Priority: Medium     Has PICC line  Daily zofran and fluids       Sciatica 04/12/2013     Priority: Medium     Generalized anxiety disorder 03/28/2013     Priority: Medium     CARDIOVASCULAR SCREENING; LDL GOAL LESS THAN 160 02/08/2012     Priority: Medium     ADHD (attention deficit hyperactivity disorder) 01/25/2005     Priority: Medium     Off Concerta          Past Medical History:    Past Medical History:   Diagnosis Date     Anxiety      Chickenpox      Febrile convulsion (H)      Major depressive disorder, recurrent episode, mild (H)      Ulcer        Past Surgical History:    Past Surgical History:   Procedure  Laterality Date     FOREIGN BODY REMOVAL      metal from her eye as a child     LAPAROTOMY MINI, TUBAL LIGATION (POST PARTUM), COMBINED Bilateral 12/22/2018    Procedure: COMBINED LAPAROTOMY MINI, TUBAL LIGATION (POST PARTUM);  Surgeon: Helen Smallwood MD;  Location: [facility] OR       Family History:    Family History   Problem Relation Age of Onset     C.A.D. Father      Cancer Father         lymphoma     Prostate Cancer Father      Cancer Maternal Grandmother         rectal     Depression Maternal Grandmother      Cerebrovascular Disease Maternal Grandmother      Prostate Cancer Maternal Grandfather      Breast Cancer Paternal Grandmother      Hypertension Paternal Grandmother      Depression Paternal Grandmother      Thyroid Disease Paternal Grandmother      Hypertension Paternal Grandfather      Macular Degeneration Paternal Grandfather      Depression Mother      Bipolar Disorder Mother      Depression Sister      Bipolar Disorder Sister      Depression Sister      Schizophrenia Maternal Uncle      Suicide Paternal Aunt      Macular Degeneration Other      Diabetes No family hx of      Glaucoma No family hx of        Social History:  Marital Status:  Single [1]  Social History     Tobacco Use     Smoking status: Former Smoker     Packs/day: 0.00     Smokeless tobacco: Never Used   Substance Use Topics     Alcohol use: No     Comment: rarely     Drug use: No        Medications:         acetaminophen (TYLENOL) 500 MG tablet       diazepam (VALIUM) 5 MG tablet       hydrOXYzine (ATARAX) 25 MG tablet       LORazepam (ATIVAN) 0.5 MG tablet       sertraline (ZOLOFT) 50 MG tablet          Review of Systems all other systems are reviewed and are negative.    Physical Exam   BP: 115/80  Pulse: 94  Temp: 98.1  F (36.7  C)  Resp: 18  SpO2: 100 %      Physical Exam in a pleasant cooperative female in mild to moderate stress but HEENT shows no facial swelling or asymmetry.  Eyes show equal reactive pupils.  Extraocular motions  are intact.  No nasal drainage.  No oral lesions.  Speech is clear.  Neck reveals tenderness of the trapezius and strap muscle especially in the left.  With range of motion she has increased discomfort with forward flexion but no meningismus.  With rotational movements to the right at about 30 degrees from forward she has increased discomfort and asked me to due to pain.  With rotation movements to the left she has almost full range of motion.  The midline bony spine is nontender.  She has no rash or lesion over the neck or shoulder area.  She has intact strength for the left upper extremity.  Sensation is intact and pulses are normal.    ED Course        Procedures               Critical Care time:  none               No results found for this or any previous visit (from the past 24 hour(s)).    Medications   ketorolac (TORADOL) injection 30 mg (30 mg Intravenous Given 1/14/21 1414)   diazepam (VALIUM) injection 5 mg (5 mg Intravenous Given 1/14/21 1416)     IVs established patient given Toradol and Valium.  Suspect torticollis.  Recheck at 3 PM patient states she is markedly better.  She is able to move her neck.  Her left hand symptoms have resolved.  Assessments & Plan (with Medical Decision Making)   Stacie Daniels is a 30 year old female who presents with 2 to 3 days of worsening left-sided neck pain.  No injury or fall.  Pain is gotten to the point where she cannot turn her head past a certain point due to discomfort.  When the pain is severe her left arm feels numb.  She currently denies paresthesias and does not think the arm is weak.  She is not had a headache with this.  Denies any ear pain, runny nose or sore throat.  No mid to low back pain.  No leg pain or weakness.  No saddle paresthesia or loss of bowel or bladder.  No recent illness and no fever or chills.  No rashes over the area.  Is been using a heating pad without much improvement.  On exam patient had tenderness of the left trapezius and strap  muscle.  Limited range of motion of the head due to pain.  Neurologically nonfocal.  Suspect torticollis.  Patient was given Toradol and Valium with marked improvement.  Is a slight twinge of pain at this point but able to move her hand all directions.  Her left hand symptoms had resolved.  Ibuprofen for pain.  Valium for spasm.  Ice not heat.  Information on torticollis is provided reasons return for assessment discussed.  I have reviewed the nursing notes.    I have reviewed the findings, diagnosis, plan and need for follow up with the patient.       New Prescriptions    DIAZEPAM (VALIUM) 5 MG TABLET    Take 1 tablet (5 mg) by mouth every 6 hours as needed for muscle spasms       Final diagnoses:   Spasmodic torticollis       1/14/2021   [facility] EMERGENCY DEPT     Ignacio Hayden MD  01/14/21 [id number]

## 2021-01-14 NOTE — DISCHARGE INSTRUCTIONS
Ice not heat.  Ibuprofen for pain.  Valium for muscle spasm.  Symptoms persist after 2 to 3 days consider physical therapy.  Your primary doctor can arrange this.

## 2021-01-14 NOTE — ED TRIAGE NOTES
Neck pain for past 2 days with last 12 hours really bad and not able to turn her neck now, is on the left neck area no injury that knows of

## 2021-01-14 NOTE — ED AVS SNAPSHOT
[facility] Emergency Dept  [address] [place] [address]  Phone: [phone]  Fax: [phone]                                    Stacie Daniels   MRN: [id number]    Department: [facility] Emergency Dept   Date of Visit: 1/14/2021           After Visit Summary Signature Page    I have received my discharge instructions, and my questions have been answered. I have discussed any challenges I see with this plan with the nurse or doctor.    ..........................................................................................................................................  Patient/Patient Representative Signature      ..........................................................................................................................................  Patient Representative Print Name and Relationship to Patient    ..................................................               ................................................  Date                                   Time    ..........................................................................................................................................  Reviewed by Signature/Title    ...................................................              ..............................................  Date                                               Time          22EPIC Rev 08/18

## 2021-01-26 ENCOUNTER — OFFICE VISIT (OUTPATIENT)
Dept: FAMILY MEDICINE | Facility: CLINIC | Age: 31
End: 2021-01-26
Payer: COMMERCIAL

## 2021-01-26 VITALS
WEIGHT: 122.2 LBS | SYSTOLIC BLOOD PRESSURE: 103 MMHG | TEMPERATURE: 99.1 F | HEART RATE: 85 BPM | BODY MASS INDEX: 22.49 KG/M2 | HEIGHT: 62 IN | OXYGEN SATURATION: 97 % | DIASTOLIC BLOOD PRESSURE: 70 MMHG

## 2021-01-26 DIAGNOSIS — M54.12 CERVICAL RADICULOPATHY: Primary | ICD-10-CM

## 2021-01-26 PROCEDURE — 99214 OFFICE O/P EST MOD 30 MIN: CPT | Performed by: FAMILY MEDICINE

## 2021-01-26 ASSESSMENT — ENCOUNTER SYMPTOMS
HEADACHES: 0
DIZZINESS: 0
HEMATOCHEZIA: 0
CONSTIPATION: 0
DIARRHEA: 0
SHORTNESS OF BREATH: 0
CHILLS: 0
ABDOMINAL PAIN: 0
EYE PAIN: 0
NECK PAIN: 1
PARESTHESIAS: 0
WEAKNESS: 0
SORE THROAT: 0
JOINT SWELLING: 0
BREAST MASS: 0
HEARTBURN: 0
NAUSEA: 0
HEMATURIA: 0
NERVOUS/ANXIOUS: 0
ARTHRALGIAS: 0
FEVER: 0
COUGH: 0
DYSURIA: 0
NECK STIFFNESS: 1
PALPITATIONS: 0
MYALGIAS: 0
FREQUENCY: 0

## 2021-01-26 ASSESSMENT — MIFFLIN-ST. JEOR: SCORE: 1232.55

## 2021-01-26 NOTE — PATIENT INSTRUCTIONS
Emily Quinones,    Thank you for allowing [facility] to manage your care.    I sent your prescriptions to your pharmacy.    I ordered a neck MRI, please call diagnostic imaging [phone] to schedule your test.    If you have any questions or concerns, please feel free to call us at [phone].    Sincerely,    Dr. Chung    Did you know?      You can schedule a video visit for follow-up appointments as well as future appointments for certain conditions.  Please see the below link.     [email or web address]    If you have not already done so,  I encourage you to sign up for [email or web address] ([email or web address]).  This will allow you to review your results, securely communicate with a provider, and schedule virtual visits as well.

## 2021-01-26 NOTE — PROGRESS NOTES
Assessment & Plan     Cervical radiculopathy  With left radicular symptoms.  ER records reviewed. Concerning for impingement.  - MR Cervical Spine w/o Contrast; Future  - acetaminophen-codeine (TYLENOL #3) 300-30 MG tablet; Take 1 tablet by mouth 2 times daily as needed for breakthrough pain or severe pain     See Patient Instructions    Return in about 1 week (around 2/2/2021) for with me, in person, neck pain f/u.    DO [facility] PEDRO Quinones is a 30 year old who presents to clinic today for the following health issues  accompanied by her mother:    HPI       ED/UC Followup:    Facility:  [facility]  Date of visit: 1/14/2021  Reason for visit: spasmodic torticollis  Current Status: shoulder, breast and arm pain     1. ER f/u 1/14/21: Patient had limited ROM in regards to neck.  Saw deformity/swelling on left shoulder.  No injury that recalls.  Gradually getting worse over the past few days.  No improvement sleeping on pillows.  Patient is guarding left arm for comfort and sitting up.  Laying down gets worse.  Went to ER and treated for torticollis with valium and toradol.  Some improvement with neck pain but shoulder, left breast and shooting left arm pain which radiates to fingers.  Right hand dominant.  Pain is constant.  Takes tylenol and ibuprofen.  Family history of neck issues.     Review of Systems   Constitutional: Negative for chills and fever.   HENT: Negative for congestion, ear pain, hearing loss and sore throat.    Eyes: Negative for pain and visual disturbance.   Respiratory: Negative for cough and shortness of breath.    Cardiovascular: Negative for chest pain, palpitations and peripheral edema.   Gastrointestinal: Negative for abdominal pain, constipation, diarrhea, heartburn, hematochezia and nausea.   Breasts:  Negative for tenderness, breast mass and discharge.   Genitourinary: Negative for dysuria, frequency, genital sores, hematuria, pelvic  "pain, urgency, vaginal bleeding and vaginal discharge.   Musculoskeletal: Positive for neck pain and neck stiffness. Negative for arthralgias, joint swelling and myalgias.   Skin: Negative for rash.   Neurological: Negative for dizziness, weakness, headaches and paresthesias.   Psychiatric/Behavioral: Negative for mood changes. The patient is not nervous/anxious.             Objective    /70 (BP Location: Right arm, Cuff Size: Adult Regular)   Pulse 85   Temp 99.1  F (37.3  C) (Tympanic)   Ht 1.575 m (5' 2\")   Wt 55.4 kg (122 lb 3.2 oz)   SpO2 97%   BMI 22.35 kg/m    Body mass index is 22.35 kg/m .       Physical Exam  Constitutional:       General: She is not in acute distress.     Appearance: She is well-developed.   HENT:      Head: Normocephalic and atraumatic.      Nose: Nose normal.   Eyes:      Conjunctiva/sclera: Conjunctivae normal.   Neck:      Trachea: No tracheal deviation.      Comments: Neck: negative spurlings, decreased ROM compared to right side in regards to neck rotation and flexion  Cardiovascular:      Rate and Rhythm: Normal rate and regular rhythm.      Heart sounds: Normal heart sounds.   Pulmonary:      Effort: Pulmonary effort is normal. No respiratory distress.      Breath sounds: Normal breath sounds.   Musculoskeletal: Normal range of motion.      Comments: Normal symmetry, good ROM in regards to flexion, external rotation (arms at side), external rotation (arms abducted at 90 deg), and internal rotation, negative Neer impingement sign, +/- Lopez, negative Apprehension sign     Skin:     General: Skin is warm.   Neurological:      Mental Status: She is alert and oriented to person, place, and time.   Psychiatric:         Behavior: Behavior normal.       "

## 2021-01-29 ENCOUNTER — ANCILLARY PROCEDURE (OUTPATIENT)
Dept: MRI IMAGING | Facility: CLINIC | Age: 31
End: 2021-01-29
Attending: FAMILY MEDICINE
Payer: COMMERCIAL

## 2021-01-29 ENCOUNTER — ANCILLARY PROCEDURE (OUTPATIENT)
Dept: GENERAL RADIOLOGY | Facility: CLINIC | Age: 31
End: 2021-01-29
Attending: FAMILY MEDICINE
Payer: COMMERCIAL

## 2021-01-29 DIAGNOSIS — Z98.890 HISTORY OF METAL REMOVED FROM EYE: ICD-10-CM

## 2021-01-29 DIAGNOSIS — M54.12 CERVICAL RADICULOPATHY: Primary | ICD-10-CM

## 2021-01-29 DIAGNOSIS — M54.12 CERVICAL RADICULOPATHY: ICD-10-CM

## 2021-01-29 PROCEDURE — 70030 X-RAY EYE FOR FOREIGN BODY: CPT | Performed by: RADIOLOGY

## 2021-01-29 PROCEDURE — 72141 MRI NECK SPINE W/O DYE: CPT | Mod: TC | Performed by: RADIOLOGY

## 2021-02-10 ENCOUNTER — HOSPITAL ENCOUNTER (OUTPATIENT)
Dept: PHYSICAL THERAPY | Facility: CLINIC | Age: 31
Setting detail: THERAPIES SERIES
End: 2021-02-10
Attending: FAMILY MEDICINE
Payer: COMMERCIAL

## 2021-02-10 DIAGNOSIS — M54.12 CERVICAL RADICULOPATHY: ICD-10-CM

## 2021-02-10 PROCEDURE — 97110 THERAPEUTIC EXERCISES: CPT | Mod: GP | Performed by: PHYSICAL MEDICINE & REHABILITATION

## 2021-02-10 PROCEDURE — 97140 MANUAL THERAPY 1/> REGIONS: CPT | Mod: GP | Performed by: PHYSICAL MEDICINE & REHABILITATION

## 2021-02-10 PROCEDURE — 97161 PT EVAL LOW COMPLEX 20 MIN: CPT | Mod: GP | Performed by: PHYSICAL MEDICINE & REHABILITATION

## 2021-02-10 NOTE — PROGRESS NOTES
"   02/10/21 1100   General Information   Type of Visit Initial OP Ortho PT Evaluation   Start of Care Date 02/10/21   Referring Physician Jasbir Chung, DO   Patient/Family Goals Statement reaching, lifting 3y.o. , carrying, work (wedding hair and makeup)   Orders Evaluate and Treat   Date of Order 01/29/21   Certification Required? Yes  (ucare)   Medical Diagnosis Cervical radiculopathy    Surgical/Medical history reviewed Yes   Precautions/Limitations no known precautions/limitations   General Information Comments PMHx per pt report: anemia, depression, smoking   Body Part(s)   Body Part(s) Cervical Spine   Presentation and Etiology   Pertinent history of current problem (include personal factors and/or comorbidities that impact the POC) Pt arrived to PT today for neck pain that started 1-1.5 months ago. Notes pain travels down L UE, was seen in ER for torticollis. Insidious onset. Notes it has spread down R UE as well occasionally,but mostly L. feels muscle fasicullations L UE. Notes fingers 3 and 4 on L hand will \"freeze up and not work.\" States feels L UE \"falls asleep\"   Impairments A. Pain;B. Decreased WB tolerance;C. Swelling;D. Decreased ROM;E. Decreased flexibility;F. Decreased strength and endurance;K. Numbness;L. Tingling   Functional Limitations perform activities of daily living;perform required work activities;perform desired leisure / sports activities   Symptom Location neck pain, L UE, occasionally R UE   How/Where did it occur From insidious onset   Onset date of current episode/exacerbation 01/14/21   Chronicity New   Pain rating (0-10 point scale) Best (/10);Worst (/10)   Best (/10) 5   Worst (/10) unable to quantify   Pain quality B. Dull;C. Aching   Frequency of pain/symptoms B. Intermittent   Pain/symptoms are: Other   Pain symptoms comment it changes, sometimes with rest, sometimes with activity   Pain/symptoms exacerbated by C. Lifting;D. Carrying;G. Certain positions;H. Overhead " reach;K. Home tasks   Pain/symptoms eased by A. Sitting;C. Rest;F. Certain positions;G. Heat   Progression of symptoms since onset: Worsened   Prior Level of Function   Prior Level of Function-Mobility independent   Prior Level of Function-ADLs independent   Current Level of Function   Current Community Support Family/friend caregiver   Patient role/employment history D. Family caregiver;C. Homemaker;A. 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 No   Have you fallen and had an injury in the past year? No   Is patient a fall risk? No   Abuse Screen (yes response referral indicated)   Feels Unsafe at Home or Work/School no   Feels Threatened by Someone no   Does Anyone Try to Keep You From Having Contact with Others or Doing Things Outside Your Home? no   Physical Signs of Abuse Present no   Functional Scales   Functional Scales Other   Other Scales  NDI: 40% disability   Cervical Spine   Integumentary  no palpable swelling   Posture slight rounded shoulders and forward head   Cervical Flexion ROM chin to chest (feels stiff and like shes fighting against motion)   Cervical Extension ROM 35* (pain, stiffness)   Cervical Right Side Bending ROM 20*   Cervical Left Side Bending ROM 35* (notes pulling and tightness on L with L SB)   Cervical Right Rotation ROM 65*   Cervical Left Rotation ROM 58* (notes pain along upper traps)   Thoracic Flexion ROM wnl   Thoracic Extension ROM wnl   Thoracic Right Side Bending ROM wnl   Thoracic Left Sidebending ROM  wnl   Thoracic Right Rotation wnl   Thoracic Left Rotation wnl   Shoulder AROM Screen flexion and abd: full ROM (feels stretch on L), ER: T3/4 B (cavitation on L with movement and pain), IR: L=L1/2 (cavitation and  pain)  R=T7/8   Cervical/Thoracic/Shoulder ROM Comments no pain with thoracic mobility testing   Shoulder Shrug (C2-C4) Strength R: 5/5,L: 5-/5   Shoulder Abd (C5) Strength 5/5 B   Shoulder Add (C7) Strength 5/5 b   Shoulder ER (C5, C6) Strength 5/5 b   Shoulder IR (C5, C6) Strength 5/5B   Elbow Flexion (C5, C6) Strength 5/5 B   Elbow Extension (C7) Strength 5/5 B   Wrist Extension (C6) Strength 5/5 B   Wrist Flexion (C7) Strength 5/5 B   Thumb Abd (C8) Strength 5/5 B   5th Finger Add (T1) Strength 5/5 B   Shoulder/Wrist/Hand Strength Comments shoulder flexion: R=5-/5, L=4+/5   Upper Trapezius Flexibility normal   Levator Scapula Flexibility normal   Scalene Flexibility normal   Pectoralis Minor Flexibility limited B   Cervical Flexibility Comments elevated 1st rib   Vertebral Artery Test normal   Alar Ligament Test normal   Transverse Ligament Test normal   Spurling Test pos on L   Cervical Distraction Test neg   Neck Flexor Endurance Test (normal 39 sec) fair   Cervical Rotation/Lateral Flexion Test pos   Neer Impingement Test neg   Lopez Impingement Test neg   Relocation Test neg   Sulcus Sign neg   Cervical/Shoulder Special Tests Comments hypermobility of L GHJ   Segmental Mobility-Cervical normal   Segmental Mobility-Thoracic slight hypomobility T3-7   Palpation noted tenderness over B upper traps, SCM and first rib   Dermatome/Sensory Testing normal dull touch sensation amongst B LE in dermatomal pattern   Neurological Testing Comments pos ULTT: ulnar, radial and median nerves   Planned Therapy Interventions   Planned Therapy Interventions ADL retraining;joint mobilization;manual therapy;motor coordination training;neuromuscular re-education;ROM;strengthening;stretching   Planned Modality Interventions   Planned Modality Interventions Cryotherapy;Electrical stimulation;Hot packs;TENS;Traction;Ultrasound   Planned Modality Interventions Comments only as needed   Clinical Impression   Criteria for Skilled  Therapeutic Interventions Met yes, treatment indicated   PT Diagnosis L UE pain secondary to TOS; neck pain   Influenced by the following impairments decreased ROM, decreased strength, impaired posture, radicular sx, pain   Functional limitations due to impairments reaching, lifting, work   Clinical Presentation Stable/Uncomplicated   Clinical Presentation Rationale age, PLOF, ROM, strength, NDI, clinical judgement   Clinical Decision Making (Complexity) Low complexity   Therapy Frequency 1 time/week   Predicted Duration of Therapy Intervention (days/wks) 8 weeks   Risk & Benefits of therapy have been explained Yes   Patient, Family & other staff in agreement with plan of care Yes   Clinical Impression Comments Pt is a 30 y.o. female who presented to the PT clinic today with a rehab diagnosis of L UE pain secondary to TOS and neck pain as evidenced by decreased ROM, decreased strength, impaired posture, radicular sx and pain. Pt is appropriate for skilled PT to address previously listed impairments in order to decrease difficulty with reaching, lifting and work.    Education Assessment   Preferred Learning Style Listening;Reading;Demonstration;Pictures/video   Barriers to Learning No barriers   ORTHO GOALS   PT Ortho Eval Goals 1;2;3;4   Ortho Goal 1   Goal Identifier 1   Goal Description Pt will be able to reach overhead without increase in sx.    Target Date 02/24/21   Ortho Goal 2   Goal Identifier 2   Goal Description Pt will be able to demonstrate 5/5 UE strength and report no increase in sx while picking up daughter.    Target Date 03/10/21   Ortho Goal 3   Goal Identifier 3   Goal Description Pt will report no radicular sx for 1 week in order to decrease difficulty with work requirements.    Target Date 03/24/21   Ortho Goal 4   Goal Identifier 4   Goal Description Pt will be independent with HEP in order to self manage symptoms.    Target Date 04/09/21   Total Evaluation Time   PT Eval, Low Complexity  Minutes (65859) 25   Therapy Certification   Certification date from 02/10/21   Certification date to 04/09/21   Medical Diagnosis Cervical radiculopathy        Please contact me with any questions or concerns.    Thank you for your referral,     Cony Lobo, PT, DPT  Physical Therapist  [address] [address] [phone]

## 2021-02-10 NOTE — PROGRESS NOTES
[facility]          OUTPATIENT PHYSICAL THERAPY ORTHOPEDIC EVALUATION  PLAN OF TREATMENT FOR OUTPATIENT REHABILITATION  (COMPLETE FOR INITIAL CLAIMS ONLY)  Patient's Last Name, First Name, M.I.  YOB: 1990  Stacie Daniels    Provider s Name:  [facility]   Medical Record No.  [id number]   Start of Care Date:  02/10/21   Onset Date:  01/14/21   Type:     _X__PT   ___OT   ___SLP Medical Diagnosis:  Cervical radiculopathy      PT Diagnosis:  L UE pain secondary to TOS; neck pain   Visits from SOC:  1      _________________________________________________________________________________  Plan of Treatment/Functional Goals:  ADL retraining, joint mobilization, manual therapy, motor coordination training, neuromuscular re-education, ROM, strengthening, stretching     Cryotherapy, Electrical stimulation, Hot packs, TENS, Traction, Ultrasound  only as needed  Goals  Goal Identifier: 1  Goal Description: Pt will be able to reach overhead without increase in sx.   Target Date: 02/24/21    Goal Identifier: 2  Goal Description: Pt will be able to demonstrate 5/5 UE strength and report no increase in sx while picking up daughter.   Target Date: 03/10/21    Goal Identifier: 3  Goal Description: Pt will report no radicular sx for 1 week in order to decrease difficulty with work requirements.   Target Date: 03/24/21    Goal Identifier: 4  Goal Description: Pt will be independent with HEP in order to self manage symptoms.   Target Date: 04/09/21        Therapy Frequency:  1 time/week  Predicted Duration of Therapy Intervention:  8 weeks    Cony Lobo PT                 I CERTIFY THE NEED FOR THESE SERVICES FURNISHED UNDER        THIS PLAN OF TREATMENT AND WHILE UNDER MY CARE     (Physician co-signature of this document indicates review and certification of the therapy plan).                       Certification Date From:  02/10/21   Certification Date  To:  04/09/21    Referring Provider:  Jasbir Chung,     Initial Assessment        See Epic Evaluation Start of Care Date: 02/10/21

## 2021-02-17 ENCOUNTER — HOSPITAL ENCOUNTER (OUTPATIENT)
Dept: PHYSICAL THERAPY | Facility: CLINIC | Age: 31
Setting detail: THERAPIES SERIES
End: 2021-02-17
Attending: FAMILY MEDICINE
Payer: COMMERCIAL

## 2021-02-17 PROCEDURE — 97140 MANUAL THERAPY 1/> REGIONS: CPT | Mod: GP | Performed by: PHYSICAL MEDICINE & REHABILITATION

## 2021-02-17 PROCEDURE — 97110 THERAPEUTIC EXERCISES: CPT | Mod: GP | Performed by: PHYSICAL MEDICINE & REHABILITATION

## 2021-02-24 ENCOUNTER — HOSPITAL ENCOUNTER (OUTPATIENT)
Dept: PHYSICAL THERAPY | Facility: CLINIC | Age: 31
Setting detail: THERAPIES SERIES
End: 2021-02-24
Attending: FAMILY MEDICINE
Payer: COMMERCIAL

## 2021-02-24 PROCEDURE — 97012 MECHANICAL TRACTION THERAPY: CPT | Mod: GP | Performed by: PHYSICAL MEDICINE & REHABILITATION

## 2021-02-24 PROCEDURE — 97110 THERAPEUTIC EXERCISES: CPT | Mod: GP | Performed by: PHYSICAL MEDICINE & REHABILITATION

## 2021-02-24 PROCEDURE — 97140 MANUAL THERAPY 1/> REGIONS: CPT | Mod: GP | Performed by: PHYSICAL MEDICINE & REHABILITATION

## 2021-03-01 ENCOUNTER — NURSE TRIAGE (OUTPATIENT)
Dept: FAMILY MEDICINE | Facility: CLINIC | Age: 31
End: 2021-03-01

## 2021-03-01 ENCOUNTER — TELEPHONE (OUTPATIENT)
Dept: FAMILY MEDICINE | Facility: CLINIC | Age: 31
End: 2021-03-01

## 2021-03-01 NOTE — TELEPHONE ENCOUNTER
Per protocol patient to be seen in clinic today. Patient declined another provider and has an appointment tomorrow morning. Patient has had these symptoms but she said they are gradually getting worse over the past 2 weeks and she said she is comfortable waiting until tomorrow, but nurse will route message to provider for review. Nurse advised if suddenly worse or the numbness and weakness spreads to seek emergent care. She verbalized understating.     Routed to provider for review, okay for patient to be seen tomorrow morning with you? Or ER evaluation today?     Thank you,   Elizabeth Senior RN    Additional Information    Negative: Difficult to awaken or acting confused (e.g., disoriented, slurred speech)    Negative: New neurologic deficit that is present NOW, sudden onset of ANY of the following: * Weakness of the face, arm, or leg on one side of the body * Numbness of the face, arm, or leg on one side of the body * Loss of speech or garbled speech    Negative: Sounds like a life-threatening emergency to the triager    Negative: Confusion, disorientation, or hallucinations is the main symptom    Negative: Dizziness is the main symptom    Negative: Followed a head injury within last 3 days    Negative: Headache (with neurologic deficit)    Negative: Unable to urinate (or only a few drops) and bladder feels very full    Negative: Loss of control of bowel or bladder (i.e., incontinence) of new onset    Negative: Back pain with numbness (loss of sensation) in groin or rectal area    Neurologic deficit of gradual onset, ANY of the following: * Weakness of the face, arm, or leg on one side of the body * Numbness of the face, arm, or leg on one side of the body * Loss of speech or garbled speech     Worsening of numbness and weakness, worse on one side but present on both sides.    No appointments today, scheduled for tomorrow am per patient request.    Negative: Patient sounds very sick or weak to the triager     "Negative: Neurologic deficit that was brief (now gone), ANY of the following: * Weakness of the face, arm, or leg on one side of the body * Numbness of the face, arm, or leg on one side of the body * Loss of speech or garbled speech    Answer Assessment - Initial Assessment Questions  1. SYMPTOM: \"What is the main symptom you are concerned about?\" (e.g., weakness, numbness)      Numbness and weakness   2. ONSET: \"When did this start?\" (minutes, hours, days; while sleeping)      progressively worse over the last 2 two   3. LAST NORMAL: \"When was the last time you were normal (no symptoms)?\"      Months   4. PATTERN \"Does this come and go, or has it been constant since it started?\"  \"Is it present now?\"      Constant   5. CARDIAC SYMPTOMS: \"Have you had any of the following symptoms: chest pain, difficulty breathing, palpitations?\"      Dizziness   6. NEUROLOGIC SYMPTOMS: \"Have you had any of the following symptoms: headache, dizziness, vision loss, double vision, changes in speech, unsteady on your feet?\"      Dizziness, feels a little unbalance and word finding difficulties   7. OTHER SYMPTOMS: \"Do you have any other symptoms?\"      Word finding difficulty   8. PREGNANCY: \"Is there any chance you are pregnant?\" \"When was your last menstrual period?\"      No, tubal ligation    Protocols used: NEUROLOGIC DEFICIT-A-OH      "

## 2021-03-02 ENCOUNTER — OFFICE VISIT (OUTPATIENT)
Dept: FAMILY MEDICINE | Facility: CLINIC | Age: 31
End: 2021-03-02
Payer: COMMERCIAL

## 2021-03-02 VITALS
BODY MASS INDEX: 22.56 KG/M2 | WEIGHT: 122.6 LBS | TEMPERATURE: 98.3 F | DIASTOLIC BLOOD PRESSURE: 70 MMHG | SYSTOLIC BLOOD PRESSURE: 106 MMHG | OXYGEN SATURATION: 98 % | HEART RATE: 72 BPM | HEIGHT: 62 IN

## 2021-03-02 DIAGNOSIS — R29.898 UPPER EXTREMITY WEAKNESS: Primary | ICD-10-CM

## 2021-03-02 DIAGNOSIS — R42 DIZZINESS: ICD-10-CM

## 2021-03-02 LAB
ALBUMIN SERPL-MCNC: 4 G/DL (ref 3.4–5)
ALP SERPL-CCNC: 53 U/L (ref 40–150)
ALT SERPL W P-5'-P-CCNC: 13 U/L (ref 0–50)
ANION GAP SERPL CALCULATED.3IONS-SCNC: <1 MMOL/L (ref 3–14)
AST SERPL W P-5'-P-CCNC: 6 U/L (ref 0–45)
BILIRUB SERPL-MCNC: 0.2 MG/DL (ref 0.2–1.3)
BUN SERPL-MCNC: 8 MG/DL (ref 7–30)
CALCIUM SERPL-MCNC: 9 MG/DL (ref 8.5–10.1)
CHLORIDE SERPL-SCNC: 109 MMOL/L (ref 94–109)
CO2 SERPL-SCNC: 32 MMOL/L (ref 20–32)
CREAT SERPL-MCNC: 0.83 MG/DL (ref 0.52–1.04)
ERYTHROCYTE [DISTWIDTH] IN BLOOD BY AUTOMATED COUNT: 12.5 % (ref 10–15)
GFR SERPL CREATININE-BSD FRML MDRD: >90 ML/MIN/{1.73_M2}
GLUCOSE SERPL-MCNC: 90 MG/DL (ref 70–99)
HCT VFR BLD AUTO: 40.7 % (ref 35–47)
HGB BLD-MCNC: 13.3 G/DL (ref 11.7–15.7)
MCH RBC QN AUTO: 31.4 PG (ref 26.5–33)
MCHC RBC AUTO-ENTMCNC: 32.7 G/DL (ref 31.5–36.5)
MCV RBC AUTO: 96 FL (ref 78–100)
PLATELET # BLD AUTO: 189 10E9/L (ref 150–450)
POTASSIUM SERPL-SCNC: 4.3 MMOL/L (ref 3.4–5.3)
PROT SERPL-MCNC: 6.7 G/DL (ref 6.8–8.8)
RBC # BLD AUTO: 4.23 10E12/L (ref 3.8–5.2)
SODIUM SERPL-SCNC: 141 MMOL/L (ref 133–144)
TSH SERPL DL<=0.005 MIU/L-ACNC: 1.58 MU/L (ref 0.4–4)
WBC # BLD AUTO: 7.1 10E9/L (ref 4–11)

## 2021-03-02 PROCEDURE — 99214 OFFICE O/P EST MOD 30 MIN: CPT | Performed by: FAMILY MEDICINE

## 2021-03-02 PROCEDURE — 85027 COMPLETE CBC AUTOMATED: CPT | Performed by: FAMILY MEDICINE

## 2021-03-02 PROCEDURE — 80053 COMPREHEN METABOLIC PANEL: CPT | Performed by: FAMILY MEDICINE

## 2021-03-02 PROCEDURE — 36415 COLL VENOUS BLD VENIPUNCTURE: CPT | Performed by: FAMILY MEDICINE

## 2021-03-02 PROCEDURE — 84443 ASSAY THYROID STIM HORMONE: CPT | Performed by: FAMILY MEDICINE

## 2021-03-02 ASSESSMENT — ENCOUNTER SYMPTOMS
JOINT SWELLING: 0
BREAST MASS: 0
HEADACHES: 0
NERVOUS/ANXIOUS: 0
CONSTIPATION: 0
PARESTHESIAS: 0
SHORTNESS OF BREATH: 0
ARTHRALGIAS: 0
ABDOMINAL PAIN: 0
DIZZINESS: 1
NAUSEA: 0
DIARRHEA: 0
FEVER: 0
MYALGIAS: 0
FREQUENCY: 0
PALPITATIONS: 0
COUGH: 0
WEAKNESS: 1
HEMATURIA: 0
DYSURIA: 0
EYE PAIN: 0
HEMATOCHEZIA: 0
SORE THROAT: 0
CHILLS: 0
HEARTBURN: 0

## 2021-03-02 ASSESSMENT — MIFFLIN-ST. JEOR: SCORE: 1229.36

## 2021-03-02 NOTE — TELEPHONE ENCOUNTER
Patient has been arrived for office visit.    Closing encounter.    Deepika Myles RN  [facility]

## 2021-03-02 NOTE — PROGRESS NOTES
Assessment & Plan     Upper extremity weakness  Most recent MRI reviewed.  Advised to keep appointment with spine specialist.  Neurologically intact.  Does not appear due to acute neurological deficits.  Discussed stroke symptoms and advised to go directly to the ER.     Dizziness  - Comprehensive metabolic panel (BMP + Alb, Alk Phos, ALT, AST, Total. Bili, TP)  - CBC with platelets  - TSH with free T4 reflex     See Patient Instructions    Return in about 1 week (around 3/9/2021), or if symptoms worsen or fail to improve, for with me.    Jasbir Chung DO  [facility] PEDRO Quinones is a 31 year old who presents for the following health issues     HPI       *follow up musculoskeletal  *left arm weakness  *dizziness x1.5 weeks    1. Left arm weakness: Hard to wash hair.  Dizziness for 1.5 weeks.  States of dropping things.  States of off balance.  Patient has been going to physical therapy.  No slurred speech.  No weakness.  History of botox.  Scheduled for a spine specialist tomorrow.     Previous Note  ER f/u 1/14/21: Patient had limited ROM in regards to neck.  Saw deformity/swelling on left shoulder.  No injury that recalls.  Gradually getting worse over the past few days.  No improvement sleeping on pillows.  Patient is guarding left arm for comfort and sitting up.  Laying down gets worse.  Went to ER and treated for torticollis with valium and toradol.  Some improvement with neck pain but shoulder, left breast and shooting left arm pain which radiates to fingers.  Right hand dominant.  Pain is constant.  Takes tylenol and ibuprofen.  Family history of neck issues.     Review of Systems   Constitutional: Negative for chills and fever.   HENT: Negative for congestion, ear pain, hearing loss and sore throat.    Eyes: Negative for pain and visual disturbance.   Respiratory: Negative for cough and shortness of breath.    Cardiovascular: Negative for chest pain, palpitations and  "peripheral edema.   Gastrointestinal: Negative for abdominal pain, constipation, diarrhea, heartburn, hematochezia and nausea.   Breasts:  Negative for tenderness, breast mass and discharge.   Genitourinary: Negative for dysuria, frequency, genital sores, hematuria, pelvic pain, urgency, vaginal bleeding and vaginal discharge.   Musculoskeletal: Negative for arthralgias, joint swelling and myalgias.   Skin: Negative for rash.   Neurological: Positive for dizziness and weakness (upper extremities). Negative for headaches and paresthesias.   Psychiatric/Behavioral: Negative for mood changes. The patient is not nervous/anxious.        Objective    /70 (BP Location: Left arm, Cuff Size: Adult Regular)   Pulse 72   Temp 98.3  F (36.8  C) (Tympanic)   Ht 1.575 m (5' 2\")   Wt 55.6 kg (122 lb 9.6 oz)   SpO2 98%   BMI 22.42 kg/m    Body mass index is 22.42 kg/m .  Physical Exam  Constitutional:       General: She is not in acute distress.     Appearance: She is well-developed.   HENT:      Head: Normocephalic and atraumatic.      Nose: Nose normal.   Eyes:      Conjunctiva/sclera: Conjunctivae normal.   Neck:      Musculoskeletal: Normal range of motion.      Trachea: No tracheal deviation.   Cardiovascular:      Rate and Rhythm: Normal rate and regular rhythm.      Heart sounds: Normal heart sounds.   Pulmonary:      Effort: Pulmonary effort is normal. No respiratory distress.      Breath sounds: Normal breath sounds.   Musculoskeletal: Normal range of motion.   Skin:     General: Skin is warm.   Neurological:      General: No focal deficit present.      Mental Status: She is alert and oriented to person, place, and time.      Cranial Nerves: No cranial nerve deficit.      Motor: No weakness.      Coordination: Coordination normal.   Psychiatric:         Behavior: Behavior normal.        IMPRESSION:  1. Mild disc degeneration.  2. No stenosis.  3. Disc/osteophyte complexes cause foraminal narrowing which at " C3-4  is mild on the right and at C5-6 is mild bilaterally.

## 2021-03-02 NOTE — PATIENT INSTRUCTIONS
Emily Quinones,    Thank you for allowing [facility] to manage your care.    I ordered some blood work, please go to the laboratory to get your laboratory studies.    Please allow 1-2 business days for our office to call you in regards to your laboratory/radiological studies.  If not done so, I encourage you to login into [employer] ([email or web address] to review your results as well.     Please keep your upcoming appointment.     If you have any questions or concerns, please feel free to call us at [phone].    Sincerely,    Dr. Chung    Did you know?      You can schedule a video visit for follow-up appointments as well as future appointments for certain conditions.  Please see the below link.     [email or web address]    If you have not already done so,  I encourage you to sign up for [employer] ([email or web address]).  This will allow you to review your results, securely communicate with a provider, and schedule virtual visits as well.

## 2021-03-02 NOTE — TELEPHONE ENCOUNTER
Noted, patient is scheduled for 10:40 am today.    Hold open in RN calls to make sure patient is seen.    Deepika Myles RN  [facility]

## 2021-03-02 NOTE — TELEPHONE ENCOUNTER
Recent MRI reviewed.  Will see patient tomorrow.  Agree with plan to go to ER if symptoms get worse.

## 2021-03-08 ENCOUNTER — HOSPITAL ENCOUNTER (OUTPATIENT)
Dept: PHYSICAL THERAPY | Facility: CLINIC | Age: 31
Setting detail: THERAPIES SERIES
End: 2021-03-08
Attending: FAMILY MEDICINE
Payer: COMMERCIAL

## 2021-03-08 PROCEDURE — 97012 MECHANICAL TRACTION THERAPY: CPT | Mod: GP | Performed by: PHYSICAL MEDICINE & REHABILITATION

## 2021-03-08 PROCEDURE — 97110 THERAPEUTIC EXERCISES: CPT | Mod: GP | Performed by: PHYSICAL MEDICINE & REHABILITATION

## 2021-03-17 ENCOUNTER — HOSPITAL ENCOUNTER (OUTPATIENT)
Dept: PHYSICAL THERAPY | Facility: CLINIC | Age: 31
Setting detail: THERAPIES SERIES
End: 2021-03-17
Attending: FAMILY MEDICINE
Payer: COMMERCIAL

## 2021-03-17 PROCEDURE — 97110 THERAPEUTIC EXERCISES: CPT | Mod: GP | Performed by: PHYSICAL MEDICINE & REHABILITATION

## 2021-03-17 PROCEDURE — 97012 MECHANICAL TRACTION THERAPY: CPT | Mod: GP | Performed by: PHYSICAL MEDICINE & REHABILITATION

## 2021-03-29 ENCOUNTER — HOSPITAL ENCOUNTER (OUTPATIENT)
Dept: PHYSICAL THERAPY | Facility: CLINIC | Age: 31
Setting detail: THERAPIES SERIES
End: 2021-03-29
Attending: FAMILY MEDICINE
Payer: COMMERCIAL

## 2021-03-29 PROCEDURE — 97110 THERAPEUTIC EXERCISES: CPT | Mod: GP | Performed by: PHYSICAL THERAPIST

## 2021-03-29 PROCEDURE — 97140 MANUAL THERAPY 1/> REGIONS: CPT | Mod: GP | Performed by: PHYSICAL THERAPIST

## 2021-03-31 ENCOUNTER — OFFICE VISIT (OUTPATIENT)
Dept: FAMILY MEDICINE | Facility: CLINIC | Age: 31
End: 2021-03-31
Payer: COMMERCIAL

## 2021-03-31 VITALS
BODY MASS INDEX: 21.95 KG/M2 | SYSTOLIC BLOOD PRESSURE: 100 MMHG | HEIGHT: 62 IN | DIASTOLIC BLOOD PRESSURE: 72 MMHG | TEMPERATURE: 97.8 F | RESPIRATION RATE: 16 BRPM | WEIGHT: 119.3 LBS | OXYGEN SATURATION: 100 % | HEART RATE: 91 BPM

## 2021-03-31 DIAGNOSIS — F41.1 GENERALIZED ANXIETY DISORDER: ICD-10-CM

## 2021-03-31 DIAGNOSIS — Z00.00 NORMAL BREAST EXAM: ICD-10-CM

## 2021-03-31 DIAGNOSIS — Z01.818 PREOP GENERAL PHYSICAL EXAM: Primary | ICD-10-CM

## 2021-03-31 LAB
ERYTHROCYTE [DISTWIDTH] IN BLOOD BY AUTOMATED COUNT: 12.1 % (ref 10–15)
HCG UR QL: NEGATIVE
HCT VFR BLD AUTO: 42.3 % (ref 35–47)
HGB BLD-MCNC: 13.7 G/DL (ref 11.7–15.7)
MCH RBC QN AUTO: 31.5 PG (ref 26.5–33)
MCHC RBC AUTO-ENTMCNC: 32.4 G/DL (ref 31.5–36.5)
MCV RBC AUTO: 97 FL (ref 78–100)
PLATELET # BLD AUTO: 181 10E9/L (ref 150–450)
RBC # BLD AUTO: 4.35 10E12/L (ref 3.8–5.2)
WBC # BLD AUTO: 5.7 10E9/L (ref 4–11)

## 2021-03-31 PROCEDURE — 81025 URINE PREGNANCY TEST: CPT | Performed by: NURSE PRACTITIONER

## 2021-03-31 PROCEDURE — 99214 OFFICE O/P EST MOD 30 MIN: CPT | Performed by: NURSE PRACTITIONER

## 2021-03-31 PROCEDURE — 85027 COMPLETE CBC AUTOMATED: CPT | Performed by: NURSE PRACTITIONER

## 2021-03-31 PROCEDURE — 36415 COLL VENOUS BLD VENIPUNCTURE: CPT | Performed by: NURSE PRACTITIONER

## 2021-03-31 RX ORDER — HYDROXYZINE HYDROCHLORIDE 25 MG/1
25 TABLET, FILM COATED ORAL EVERY 8 HOURS PRN
Qty: 60 TABLET | Refills: 1 | Status: SHIPPED | OUTPATIENT
Start: 2021-03-31 | End: 2023-02-28

## 2021-03-31 ASSESSMENT — MIFFLIN-ST. JEOR: SCORE: 1214.39

## 2021-03-31 NOTE — PROGRESS NOTES
[facility]  [address] [address]  Phone: [phone]  Primary Provider: [facility]  Pre-op Performing Provider: ERIKA FLEMING       :[id number]}  PREOPERATIVE EVALUATION:  Today's date: 3/31/2021    Stacie Daniels is a 31 year old female who presents for a preoperative evaluation.    Surgical Information:  Surgery/Procedure: bilateral breast Augmentation   Surgery Location: [facility] in [place]   Surgeon: DR Moore   Surgery Date: 4/28/2021   Time of Surgery: 7:00 AM   Where patient plans to recover: At home with family  Fax number for surgical facility: [phone] also needs this mailed     Type of Anesthesia Anticipated: to be determined    Assessment & Plan     The proposed surgical procedure is considered INTERMEDIATE risk.    Preop general physical exam    - HCG Qual, Urine ([id number])  - CBC with platelets  - Asymptomatic COVID-19 Virus (Coronavirus) by PCR; Future    Normal breast exam  -patient scheduled for breast augmentation surgery     Generalized anxiety disorder    - hydrOXYzine (ATARAX) 25 MG tablet; Take 1 tablet (25 mg) by mouth every 8 hours as needed for anxiety or other (sleep)         Risks and Recommendations:  The patient has the following additional risks and recommendations for perioperative complications:   - No identified additional risk factors other than previously addressed    Medication Instructions:  Patient is to take all scheduled medications on the day of surgery    RECOMMENDATION:  APPROVAL GIVEN to proceed with proposed procedure, without further diagnostic evaluation.      Subjective     HPI related to upcoming procedure: breast augmentation surgery     Preop Questions 3/31/2021   1. Have you ever had a heart attack or stroke? No   2. Have you ever had surgery on your heart or blood vessels, such as a stent placement, a coronary artery bypass, or surgery on an artery in your head, neck, heart, or legs?  No   3. Do you have chest pain with activity? No   4. Do you have a history of  heart failure? No   5. Do you currently have a cold, bronchitis or symptoms of other infection? No   6. Do you have a cough, shortness of breath, or wheezing? No   7. Do you or anyone in your family have previous history of blood clots? No   8. Do you or does anyone in your family have a serious bleeding problem such as prolonged bleeding following surgeries or cuts? No   9. Have you ever had problems with anemia or been told to take iron pills? YES    10. Have you had any abnormal blood loss such as black, tarry or bloody stools, or abnormal vaginal bleeding? No   11. Have you ever had a blood transfusion? No   12. Are you willing to have a blood transfusion if it is medically needed before, during, or after your surgery? Yes   13. Have you or any of your relatives ever had problems with anesthesia? No   14. Do you have sleep apnea, excessive snoring or daytime drowsiness? No   15. Do you have any artifical heart valves or other implanted medical devices like a pacemaker, defibrillator, or continuous glucose monitor? No   16. Do you have artificial joints? No   17. Are you allergic to latex? No   18. Is there any chance that you may be pregnant? No         Preoperative Review of :   reviewed - controlled substances prescribed by other outside provider(s).    03/26/2021  1   03/26/2021  Hydrocodone-Acetamin 5-325 MG  1.00  1 Da Smi   [id number]   [place] (0280)   0  5.00 MME  [place]icaid [place]   01/26/2021  2   01/26/2021  Acetaminophen-Cod #3 Tablet  10.00  5 Kh Courtney   [id number]   [place] (6860)   0  9.00 MME  [place]icaid [place]   01/14/2021  2   01/14/2021  Diazepam 5 MG Tablet  20.00  5 De Kra   [id number]   [place] (9057)            Status of Chronic Conditions:  See problem list for active medical problems.  Problems all longstanding and stable, except as noted/documented.  See ROS for pertinent symptoms related to these conditions.      Review of  Systems  Constitutional, neuro, ENT, endocrine, pulmonary, cardiac, gastrointestinal, genitourinary, musculoskeletal, integument and psychiatric systems are negative, except as otherwise noted.    Patient Active Problem List    Diagnosis Date Noted     S/P tubal ligation 12/22/2018     Priority: Medium     Fetal arrhythmia before the onset of labor 12/20/2018     Priority: Medium     Prenatal care, subsequent pregnancy 05/23/2018     Priority: Medium     FOB- Crystal Kraolamide       PTSD (post-traumatic stress disorder) 10/07/2013     Priority: Medium     Hyperemesis arising during pregnancy 05/01/2013     Priority: Medium     Has PICC line  Daily zofran and fluids       Sciatica 04/12/2013     Priority: Medium     Generalized anxiety disorder 03/28/2013     Priority: Medium     CARDIOVASCULAR SCREENING; LDL GOAL LESS THAN 160 02/08/2012     Priority: Medium     ADHD (attention deficit hyperactivity disorder) 01/25/2005     Priority: Medium     Off Concerta        Past Medical History:   Diagnosis Date     Anxiety      Chickenpox      Febrile convulsion (H)     toddler     Major depressive disorder, recurrent episode, mild (H)     off zoloft     Ulcer     age 20     Past Surgical History:   Procedure Laterality Date     FOREIGN BODY REMOVAL      metal from her eye as a child     LAPAROTOMY MINI, TUBAL LIGATION (POST PARTUM), COMBINED Bilateral 12/22/2018    Procedure: COMBINED LAPAROTOMY MINI, TUBAL LIGATION (POST PARTUM);  Surgeon: Helen Smallwood MD;  Location: [facility] OR     Current Outpatient Medications   Medication Sig Dispense Refill     hydrOXYzine (ATARAX) 25 MG tablet Take 1 tablet (25 mg) by mouth every 8 hours as needed for anxiety or other (sleep) 60 tablet 1     acetaminophen (TYLENOL) 500 MG tablet Take 500 mg by mouth every 6 hours as needed for mild pain       diazepam (VALIUM) 5 MG tablet Take 1 tablet (5 mg) by mouth every 6 hours as needed for muscle spasms (Patient not taking: Reported on 1/26/2021) 20  "tablet 0     LORazepam (ATIVAN) 0.5 MG tablet Take one half to one tablet every 8 hours as needed for anxiety. (Patient not taking: Reported on 1/26/2021) 30 tablet 0       Allergies   Allergen Reactions     Augmentin Hives     Avocado Hives and Swelling     Flagyl [Metronidazole] Dizziness and Rash        Social History     Tobacco Use     Smoking status: Former Smoker     Packs/day: 0.00     Smokeless tobacco: Never Used   Substance Use Topics     Alcohol use: No     Comment: rarely       History   Drug Use No         Objective     /72 (BP Location: Right arm, Patient Position: Sitting, Cuff Size: Adult Regular)   Pulse 91   Temp 97.8  F (36.6  C) (Tympanic)   Resp 16   Ht 1.575 m (5' 2\")   Wt 54.1 kg (119 lb 4.8 oz)   LMP 03/30/2021   SpO2 100%   BMI 21.82 kg/m      Physical Exam    GENERAL APPEARANCE: healthy, alert and no distress     EYES: EOMI, PERRL     HENT: ear canals and TM's normal and nose and mouth without ulcers or lesions     NECK: no adenopathy, no asymmetry, masses, or scars and thyroid normal to palpation     RESP: lungs clear to auscultation - no rales, rhonchi or wheezes     BREAST: normal without masses, tenderness or nipple discharge and no palpable axillary masses or adenopathy     CV: regular rates and rhythm, normal S1 S2, no S3 or S4 and no murmur, click or rub     MS: extremities normal- no gross deformities noted, no evidence of inflammation in joints, FROM in all extremities.     SKIN: no suspicious lesions or rashes     NEURO: Normal strength and tone, sensory exam grossly normal, mentation intact and speech normal     PSYCH: mentation appears normal. and affect normal/bright     LYMPHATICS: No cervical adenopathy    Recent Labs   Lab Test 03/02/21  1141 06/08/20  1719   HGB 13.3 13.8    201    142   POTASSIUM 4.3 3.5   CR 0.83 0.77        Diagnostics:  Recent Results (from the past 24 hour(s))   HCG Qual, Urine ([id number])    Collection Time: 03/31/21  9:31 AM "   Result Value Ref Range    HCG Qual Urine Negative NEG^Negative   CBC with platelets    Collection Time: 03/31/21  9:31 AM   Result Value Ref Range    WBC 5.7 4.0 - 11.0 10e9/L    RBC Count 4.35 3.8 - 5.2 10e12/L    Hemoglobin 13.7 11.7 - 15.7 g/dL    Hematocrit 42.3 35.0 - 47.0 %    MCV 97 78 - 100 fl    MCH 31.5 26.5 - 33.0 pg    MCHC 32.4 31.5 - 36.5 g/dL    RDW 12.1 10.0 - 15.0 %    Platelet Count 181 150 - 450 10e9/L      No EKG required, no history of coronary heart disease, significant arrhythmia, peripheral arterial disease or other structural heart disease.    Revised Cardiac Risk Index (RCRI):  The patient has the following serious cardiovascular risks for perioperative complications:   - No serious cardiac risks = 0 points     RCRI Interpretation: 0 points: Class I (very low risk - 0.4% complication rate)           Signed Electronically by: STEPHANIE Leger CNP  Copy of this evaluation report is provided to requesting physician.

## 2021-04-02 ENCOUNTER — TELEPHONE (OUTPATIENT)
Dept: FAMILY MEDICINE | Facility: CLINIC | Age: 31
End: 2021-04-02

## 2021-04-02 NOTE — TELEPHONE ENCOUNTER
Panel Management Review    Summary:    Patient is due/failing the following:   PHYSICAL    Type of outreach:    Sent [employer] [facility] message.    Questions for provider review:    None                                                                                                                                    Gail Jaramillo, [facility]      Chart routed to none .

## 2021-04-04 ENCOUNTER — HEALTH MAINTENANCE LETTER (OUTPATIENT)
Age: 31
End: 2021-04-04

## 2021-04-24 DIAGNOSIS — Z01.818 PREOP GENERAL PHYSICAL EXAM: ICD-10-CM

## 2021-04-24 LAB
SARS-COV-2 RNA RESP QL NAA+PROBE: NORMAL
SPECIMEN SOURCE: NORMAL

## 2021-04-24 PROCEDURE — U0003 INFECTIOUS AGENT DETECTION BY NUCLEIC ACID (DNA OR RNA); SEVERE ACUTE RESPIRATORY SYNDROME CORONAVIRUS 2 (SARS-COV-2) (CORONAVIRUS DISEASE [COVID-19]), AMPLIFIED PROBE TECHNIQUE, MAKING USE OF HIGH THROUGHPUT TECHNOLOGIES AS DESCRIBED BY CMS-2020-01-R: HCPCS | Performed by: NURSE PRACTITIONER

## 2021-04-24 PROCEDURE — U0005 INFEC AGEN DETEC AMPLI PROBE: HCPCS | Performed by: NURSE PRACTITIONER

## 2021-04-25 LAB
LABORATORY COMMENT REPORT: NORMAL
SARS-COV-2 RNA RESP QL NAA+PROBE: NEGATIVE
SPECIMEN SOURCE: NORMAL

## 2021-05-19 ENCOUNTER — NURSE TRIAGE (OUTPATIENT)
Dept: NURSING | Facility: CLINIC | Age: 31
End: 2021-05-19

## 2021-05-19 NOTE — TELEPHONE ENCOUNTER
Breast augmentation surgery 3 weeks ago. Left breast is draining a small amount of yellow pus. Still has a little swelling since surgery. The incision looks a little red, but has not spread. No fever noted.  Surgeon: Dr Jonathan Moore in [place]. ([facility]). She last saw him a few days ago. She her drainage has continued, despite not wearing a bra as instructed. Her next visit for follow up is in 3 weeks.   PCP: Brennon. Dr Chung.     Triaged to a disposition of Go to office now. Advised patient to contact her surgeon's office now. She will need to find out if she needs to be seen again, and when. She will also ask about applying a topical antibiotic to the wound.     Essie Ewing RN Triage Nurse Advisor 3:11 PM 5/19/2021    Reason for Disposition    Pus or bad-smelling fluid draining from incision    Additional Information    Negative: Major abdominal surgical incision and wound gaping open with visible internal organs    Negative: Sounds like a life-threatening emergency to the triager    Negative: Bleeding from incision and won't stop after 10 minutes of direct pressure    Negative: Widespread rash and bright red, sunburn-like    Negative: Severe pain in the incision    Negative: Incision gaping open and < 2 days (48 hours) since wound re-opened    Negative: Incision gaping open and length of opening > 2 inches (5 cm)    Negative: Patient sounds very sick or weak to the triager    Negative: Sounds like a serious complication to the triager    Negative: Fever > 100.4 F (38.0 C)    Negative: Incision looks infected (spreading redness, pain)    Negative: Red streak runs from the incision and longer than 1 inch (2.5 cm)    Protocols used: POST-OP INCISION SYMPTOMS AND [id number]

## 2021-05-22 ENCOUNTER — HOSPITAL ENCOUNTER (EMERGENCY)
Facility: CLINIC | Age: 31
Discharge: HOME | End: 2021-05-22
Attending: FAMILY MEDICINE | Admitting: FAMILY MEDICINE
Payer: COMMERCIAL

## 2021-05-22 VITALS
OXYGEN SATURATION: 99 % | TEMPERATURE: 97.9 F | WEIGHT: 125 LBS | HEART RATE: 62 BPM | HEIGHT: 62 IN | BODY MASS INDEX: 23 KG/M2 | SYSTOLIC BLOOD PRESSURE: 110 MMHG | DIASTOLIC BLOOD PRESSURE: 64 MMHG | RESPIRATION RATE: 16 BRPM

## 2021-05-22 DIAGNOSIS — G54.0 THORACIC OUTLET SYNDROME: ICD-10-CM

## 2021-05-22 DIAGNOSIS — L76.82 POSTOPERATIVE COMPLICATION OF SKIN INVOLVING DRAINAGE FROM SURGICAL WOUND: ICD-10-CM

## 2021-05-22 PROCEDURE — 99284 EMERGENCY DEPT VISIT MOD MDM: CPT | Performed by: FAMILY MEDICINE

## 2021-05-22 PROCEDURE — 99283 EMERGENCY DEPT VISIT LOW MDM: CPT | Performed by: FAMILY MEDICINE

## 2021-05-22 RX ORDER — CYCLOBENZAPRINE HCL 10 MG
10 TABLET ORAL 3 TIMES DAILY PRN
Qty: 21 TABLET | Refills: 0 | Status: SHIPPED | OUTPATIENT
Start: 2021-05-22 | End: 2021-05-29

## 2021-05-22 RX ORDER — CEPHALEXIN 500 MG/1
500 CAPSULE ORAL 4 TIMES DAILY
Qty: 28 CAPSULE | Refills: 0 | Status: SHIPPED | OUTPATIENT
Start: 2021-05-22 | End: 2021-05-29

## 2021-05-22 ASSESSMENT — MIFFLIN-ST. JEOR: SCORE: 1240.25

## 2021-05-22 NOTE — ED PROVIDER NOTES
"  History     Chief Complaint   Patient presents with     Shoulder Pain     HPI  Stacie Daniels is a 31 year old female who presents to the emergency department with 2 complaints.  Problem #1 is left shoulder pain.  She has had this for some time and has been diagnosed with thoracic outlet syndrome.  She has had an MRI of her neck and possibly shoulder.  She was on physical therapy a muscle relaxant and doing better however she had a breast augmentation surgery 3 weeks ago and was advised to stop physical therapy at that time.  Since stopping her physical therapy her shoulder is bothering her more.  There is nothing new or atypical about the pain in her shoulder.  She does get numbness and tingling in her arm and hand.  She stopped anti-inflammatories.  She has done well with \"muscle relaxant\" in the past and request a prescription for such.  She is on no narcotics.  Problem #2 is her left breast surgical incision opened last week and had some clear yellow drainage.  She saw her doctor last Monday who recommended that she not wear a bra and did not feel it was infected.  Since that time the drainage has increased.  She is seen no purulent drainage.  She has had no redness or warmth.  The small open area is a little tender and she is concerned for infection.  She has had no redness warmth tenderness or swelling of the breast area itself.  She has no fever.  She otherwise feels well.    Allergies:  Allergies   Allergen Reactions     Augmentin Hives     Avocado Hives and Swelling     Flagyl [Metronidazole] Dizziness and Rash       Problem List:    Patient Active Problem List    Diagnosis Date Noted     S/P tubal ligation 12/22/2018     Priority: Medium     Fetal arrhythmia before the onset of labor 12/20/2018     Priority: Medium     Prenatal care, subsequent pregnancy 05/23/2018     Priority: Medium     EFRAIN Santiago       PTSD (post-traumatic stress disorder) 10/07/2013     Priority: Medium     Hyperemesis " arising during pregnancy 05/01/2013     Priority: Medium     Has PICC line  Daily zofran and fluids       Sciatica 04/12/2013     Priority: Medium     Generalized anxiety disorder 03/28/2013     Priority: Medium     CARDIOVASCULAR SCREENING; LDL GOAL LESS THAN 160 02/08/2012     Priority: Medium     ADHD (attention deficit hyperactivity disorder) 01/25/2005     Priority: Medium     Off Concerta          Past Medical History:    Past Medical History:   Diagnosis Date     Anxiety      Chickenpox      Febrile convulsion (H)      Major depressive disorder, recurrent episode, mild (H)      Ulcer        Past Surgical History:    Past Surgical History:   Procedure Laterality Date     FOREIGN BODY REMOVAL      metal from her eye as a child     LAPAROTOMY MINI, TUBAL LIGATION (POST PARTUM), COMBINED Bilateral 12/22/2018    Procedure: COMBINED LAPAROTOMY MINI, TUBAL LIGATION (POST PARTUM);  Surgeon: Helen Smallwood MD;  Location: [facility] OR       Family History:    Family History   Problem Relation Age of Onset     C.A.D. Father      Cancer Father         lymphoma     Prostate Cancer Father      Cancer Maternal Grandmother         rectal     Depression Maternal Grandmother      Cerebrovascular Disease Maternal Grandmother      Prostate Cancer Maternal Grandfather      Breast Cancer Paternal Grandmother      Hypertension Paternal Grandmother      Depression Paternal Grandmother      Thyroid Disease Paternal Grandmother      Hypertension Paternal Grandfather      Macular Degeneration Paternal Grandfather      Depression Mother      Bipolar Disorder Mother      Depression Sister      Bipolar Disorder Sister      Depression Sister      Schizophrenia Maternal Uncle      Suicide Paternal Aunt      Macular Degeneration Other      Diabetes No family hx of      Glaucoma No family hx of        Social History:  Marital Status:  Single [1]  Social History     Tobacco Use     Smoking status: Former Smoker     Packs/day: 0.00     Smokeless  "tobacco: Never Used   Substance Use Topics     Alcohol use: No     Comment: rarely     Drug use: No        Medications:    cephALEXin (KEFLEX) 500 MG capsule  cyclobenzaprine (FLEXERIL) 10 MG tablet  acetaminophen (TYLENOL) 500 MG tablet  diazepam (VALIUM) 5 MG tablet  hydrOXYzine (ATARAX) 25 MG tablet  LORazepam (ATIVAN) 0.5 MG tablet          Review of Systems   All other systems reviewed and are negative.      Physical Exam   BP: 112/66  Pulse: 83  Temp: 97.9  F (36.6  C)  Resp: 16  Height: 157.5 cm (5' 2\")  Weight: 56.7 kg (125 lb)  SpO2: 99 %      Physical Exam  Vitals signs and nursing note reviewed.   Constitutional:       Appearance: Normal appearance.   HENT:      Head: Normocephalic and atraumatic.   Pulmonary:      Comments: Her breasts were examined with nurse present.--Maryanne.  Both breasts are symmetric.  The incision under her right breast is completely healed.  There is no tenderness.  There is no palpable seromas.  There is no openings or drainage.  The left surgical wound has just a very tiny opening at the lateral margin.  There is clear yellow serous drainage.  There is no redness or warmth.  There is minimal tenderness on palpation.  There is no underlying mass or palpable seromas.  There is no evidence of abscess.  There is no purulent drainage when the breast, tissue around the breast and surgical wound are palpated and pressed.  Neurological:      Mental Status: She is alert.         ED Course        Procedures               Critical Care time:  none               No results found for this or any previous visit (from the past 24 hour(s)).    Medications - No data to display    Assessments & Plan (with Medical Decision Making)   [facility]--31-year-old female with 2 concerns.  Problem #1 is she is having difficulties with her left shoulder pain which is chronic and has been diagnosed with thoracic outlet syndrome.  She has had more difficulty since stopping anti-inflammatories, her physical therapy and " a muscle relaxant.  I recommended restarting the anti-inflammatories and gave her a prescription for Flexeril 10 mg 3 times daily #21.  She should follow-up with the doctor following her for her thoracic outlet syndrome to see if she can do some modified physical therapy given her recent breast surgery.  In regards to her breast augmentation surgery 3 weeks ago her surgical wounds appear to be healing well.  There is a small opening on the lateral aspect of her left surgical incision which has some clear yellow serous drainage.  There is no significant palpable seroma that I can appreciate on palpation.  There is no evidence of abscess.  There is no evidence of cellulitis.  I suspect this is just some serous drainage and reassured the patient.  She would like to start an antibiotic.  I did agree to put her on Keflex 500 4 times daily but she needs to follow-up with her surgeon on Monday.  Patient discharged in good condition.  I have reviewed the nursing notes.    I have reviewed the findings, diagnosis, plan and need for follow up with the patient.       Discharge Medication List as of 5/22/2021  1:30 PM      START taking these medications    Details   cephALEXin (KEFLEX) 500 MG capsule Take 1 capsule (500 mg) by mouth 4 times daily for 7 days, Disp-28 capsule, R-0, E-Prescribe      cyclobenzaprine (FLEXERIL) 10 MG tablet Take 1 tablet (10 mg) by mouth 3 times daily as needed for muscle spasms, Disp-21 tablet, R-0, Local Print             Final diagnoses:   Postoperative complication of skin involving drainage from surgical wound   Thoracic outlet syndrome       5/22/2021   [facility] EMERGENCY DEPT     Griselda, Noel MICHAEL MD  05/22/21 0460

## 2021-05-22 NOTE — DISCHARGE INSTRUCTIONS
Call your surgeon Monday and update him on changes.  Follow-up with your doctor who is managing your thoracic outlet syndrome.  Take ibuprofen or Aleve.  Take Flexeril-muscle relaxant 3 times a day.  Do not mix with alcohol.  No driving.  Can be sedating.  Resume physical therapy only when approved by your surgeon.

## 2021-05-22 NOTE — ED TRIAGE NOTES
Left shoulder pain post surgery, has not been going to physical therapy.  She also had recent breast augmentation recently and is having left breast drainage and pain.

## 2021-06-03 NOTE — PROGRESS NOTES
Outpatient Physical Therapy Discharge Note     Patient: Stacie Daniels  : 1990    Beginning/End Dates of Reporting Period:  2/10/21 to 3/29/21    Referring Provider: Jasbir Chung DO Therapy Diagnosis: L UE pain secondary to TOS; neck pain    Patient did not return for follow up treatments as directed.  Goal status and current objective information is therefore unknown.  Discharge from PT services at this time for this episode of treatment. Please see attached documentation under this episode of care for further information including dates of service, start of care date, referring physician, Dx, treatment plan, treatments, etc.    Please contact me with any questions or concerns.    Thank you for your referral.    Cony Lobo, PT, DPT  Physical Therapist  [address] [address] [phone]

## 2021-06-24 ENCOUNTER — TELEPHONE (OUTPATIENT)
Dept: FAMILY MEDICINE | Facility: CLINIC | Age: 31
End: 2021-06-24

## 2021-06-24 ENCOUNTER — OFFICE VISIT (OUTPATIENT)
Dept: FAMILY MEDICINE | Facility: CLINIC | Age: 31
End: 2021-06-24
Payer: COMMERCIAL

## 2021-06-24 VITALS
TEMPERATURE: 98.3 F | WEIGHT: 118.6 LBS | DIASTOLIC BLOOD PRESSURE: 72 MMHG | HEART RATE: 82 BPM | SYSTOLIC BLOOD PRESSURE: 115 MMHG | BODY MASS INDEX: 21.69 KG/M2

## 2021-06-24 DIAGNOSIS — M25.512 LEFT SHOULDER PAIN, UNSPECIFIED CHRONICITY: ICD-10-CM

## 2021-06-24 DIAGNOSIS — M54.31 SCIATICA, RIGHT SIDE: ICD-10-CM

## 2021-06-24 DIAGNOSIS — G25.89 SCAPULAR DYSKINESIS: Primary | ICD-10-CM

## 2021-06-24 DIAGNOSIS — F41.1 GENERALIZED ANXIETY DISORDER: ICD-10-CM

## 2021-06-24 PROCEDURE — 99214 OFFICE O/P EST MOD 30 MIN: CPT | Performed by: PHYSICIAN ASSISTANT

## 2021-06-24 RX ORDER — LORAZEPAM 0.5 MG/1
TABLET ORAL
Qty: 4 TABLET | Refills: 0 | Status: SHIPPED | OUTPATIENT
Start: 2021-06-24 | End: 2023-02-21

## 2021-06-24 NOTE — TELEPHONE ENCOUNTER
Small refill of Ativan provided for fear of flying.  Patient needs follow-up with PCP if further refills needed.    Juliet Haro PA-C on 6/24/2021 at 12:21 PM

## 2021-06-24 NOTE — PROGRESS NOTES
Assessment & Plan     Scapular dyskinesis  Left shoulder pain, unspecified chronicity  Patient is a 31-year-old female who presents to clinic due to left neck pain that radiates into her shoulder and arm as well as numbness.  Vital signs normal.    -Low suspicion for thoracic outlet syndrome given negative Corazon test.  Patient maintains pulse throughout.  -Physical exam findings do show rounded posture with scapular dyskinesis as well as muscle tightness of scalenes and trapezius.  These findings combined are likely creating nerve compression and are causing symptoms.  Discussed importance of maintaining proper posture.  Recommended physical therapy.  Referral placed.  Discussed conservative treatment with heat/ice, Tylenol, and previously prescribed muscle relaxer if needed.  - [facility]; Future      Sciatica, right side  Patient also notes numbness to the right most lateral 3 toes.  Unchanged by position, but worsens with walking.  No low back pain.  Physical exam without acute abnormalities.  Findings are most consistent with sciatic nerve compression likely by piriformis.  Recommended physical therapy and discussed above conservative measures.  - [facility]; Future    See Patient Instructions    Return in about 6 weeks (around 8/5/2021), or if symptoms worsen or fail to improve.    Juliet Haro PA-C  [facility] PEDRO Quinones is a 31 year old who presents for the following health issues    HPI     Per chart review, patient evaluated in emergency department 5/22/2021 due to shoulder pain, thoracic outlet syndrome.  Patient has been diagnosed with thoracic outlet syndrome but discontinued therapy as well as anti-inflammatories due to recent breast augmentation surgery (2 weeks prior to visit).  Patient prescribed Flexeril.    3/2/2021, patient evaluated by PCP due to upper extremity weakness.  Patient was to follow-up with spine specialist and was  neurologically intact at the time.  MRI of cervical spine completed January 29, 2021 showing mild disc degeneration, no stenosis, and mild foraminal narrowing in two areas.    Musculoskeletal problem/pain  Onset/Duration: 1 week for the flare up; pt has thoracic outlet syndrome. Right toes/foot feeling numb for a week. Patient notes that flexeril helped symptoms, but only used it once as it made her tired. She has resumed home exercises. Patient has not resumed formal PT. Symptoms started initially in January. Patient was evaluated by spine, who patient states said he was unable to help with thoracic outlet syndrome. Patient notes pain that starts at left aspect of neck and radiates into left arm causing weakness and pain.     Patient also notes numbness in the lateral three toes of the right foot. Not specifically position, but worsens throughout the day. No pain in foot or low back.   Description  Location: shoulder, arm, neck - left; Right foot/toes  Joint Swelling: no  Redness: no  Pain: YES- pain and muscle spasms  Warmth: YES- feels warm sometimes  Intensity:  moderate, severe  Progression of Symptoms:  worsening  Accompanying signs and symptoms:   Fevers: no  Numbness/tingling/weakness: YES- not in the arm but the numbness in the last 3 toes and far right side of foot  History  Trauma to the area: no  Recent illness:  no  Previous similar problem: YES- for the shoulder/arm  Previous evaluation:  YES- for the shoulder/arm  Precipitating or alleviating factors:  Aggravating factors include: Can't do anything with the left arm  Therapies tried and outcome: acetaminophen, Ibuprofen, physical therapy and muscle relaxers          Objective    /72 (BP Location: Right arm, Patient Position: Sitting, Cuff Size: Adult Regular)   Pulse 82   Temp 98.3  F (36.8  C) (Tympanic)   Wt 53.8 kg (118 lb 9.6 oz)   BMI 21.69 kg/m    Body mass index is 21.69 kg/m .  Physical Exam  Vitals signs and nursing note reviewed.    Constitutional:       General: She is not in acute distress.     Appearance: Normal appearance.   HENT:      Head: Normocephalic and atraumatic.      Mouth/Throat:      Mouth: Mucous membranes are moist.      Pharynx: Oropharynx is clear.   Eyes:      Extraocular Movements: Extraocular movements intact.      Pupils: Pupils are equal, round, and reactive to light.   Neck:      Musculoskeletal: Normal range of motion.   Cardiovascular:      Rate and Rhythm: Normal rate and regular rhythm.      Heart sounds: Normal heart sounds.   Pulmonary:      Effort: Pulmonary effort is normal.      Breath sounds: Normal breath sounds.   Musculoskeletal: Normal range of motion.      Comments: Rounded posture  Mild winged scapula bilaterally with mild scapular dyskinesis on left  Interosseous strength 5/5 bilaterally,  strength 5/5 bilaterally  Firmness to palpation of scalene and trapezius on the left  Shoulders:  ROM:  bilaterally, abduction 170 bilaterally, ER 80 bilaterally, IR mid back bilaterally  Strength: 5/5 in IR, ER, FF bilaterally  Corazon test: Negative, pulse remains intact.  Patient does note increased tingling/numbness of hand and wrist position.    No tenderness palpation of lumbar spine or paraspinal musculature on right.   Negative straight leg raise.  Symptoms unchanged with position   Skin:     General: Skin is warm and dry.   Neurological:      General: No focal deficit present.      Mental Status: She is alert.   Psychiatric:         Mood and Affect: Mood normal.         Behavior: Behavior normal.

## 2021-06-24 NOTE — TELEPHONE ENCOUNTER
Patient forgot to mention that she is flying tomorrow and wondering if she could get the Lorazepam (Ativan) 0.5 mg tablet refilled for the flight due to her fear of flying. [facility]. Please let her know if this could be done.

## 2021-06-24 NOTE — TELEPHONE ENCOUNTER
Attempted to reach patient, no answer/no VM. Will try again, [facility] is only open until 5 pm.

## 2021-06-24 NOTE — PATIENT INSTRUCTIONS
Your  left neck, shoulder, and arm symptoms are likely due to posture, improper movement of your left shoulder blade, and muscle tightness within the neck and shoulder itself.     Your right toe numbness is likely due to nerve compression near your pelvis.  This can occur with pelvic instability.     A referral for both of these concerns has been placed to physical therapy.  Please call the number on your paperwork to schedule.      For discomfort you may use Tylenol/ibuprofen, heat/ice, and previously prescribed muscle relaxer.  With physical therapy and home exercises I would expect only 6 weeks for symptoms to have significant improvement.    Please reach out with any questions or concerns.  Follow-up in clinic if you develop any new or worsening symptoms.  Additionally, follow-up if your symptoms are not improving with our treatment plan.

## 2021-09-18 ENCOUNTER — HEALTH MAINTENANCE LETTER (OUTPATIENT)
Age: 31
End: 2021-09-18

## 2021-10-13 ENCOUNTER — OFFICE VISIT (OUTPATIENT)
Dept: FAMILY MEDICINE | Facility: CLINIC | Age: 31
End: 2021-10-13
Payer: COMMERCIAL

## 2021-10-13 VITALS
OXYGEN SATURATION: 100 % | HEIGHT: 62 IN | WEIGHT: 124 LBS | HEART RATE: 88 BPM | BODY MASS INDEX: 22.82 KG/M2 | RESPIRATION RATE: 18 BRPM | TEMPERATURE: 98 F | SYSTOLIC BLOOD PRESSURE: 106 MMHG | DIASTOLIC BLOOD PRESSURE: 62 MMHG

## 2021-10-13 DIAGNOSIS — N64.52 DISCHARGE FROM BREAST: ICD-10-CM

## 2021-10-13 DIAGNOSIS — N63.10 LUMP OF RIGHT BREAST: Primary | ICD-10-CM

## 2021-10-13 PROCEDURE — 99213 OFFICE O/P EST LOW 20 MIN: CPT | Performed by: NURSE PRACTITIONER

## 2021-10-13 ASSESSMENT — PAIN SCALES - GENERAL: PAINLEVEL: MILD PAIN (2)

## 2021-10-13 ASSESSMENT — MIFFLIN-ST. JEOR: SCORE: 1231.74

## 2021-10-13 NOTE — PATIENT INSTRUCTIONS
Lump of right breast  New lump of right breast adjacent to nipple with mild tenderness and patient noted nipple discharge. No signs and symptoms of infection, swelling or erythema noted. Appears to be more consistent with plugged duct, advised to start warm compresses several times a day to area and if not improving or worsening schedule mammogram and ultrasound to further evaluate.   - MA Diagnostic Digital Bilateral; Future  - US Breast Right Limited 1-3 Quadrants; Future    Discharge from breast  Discharge and lump of right breast as above.   - MA Diagnostic Digital Bilateral; Future  - US Breast Right Limited 1-3 Quadrants; Future

## 2021-10-13 NOTE — PROGRESS NOTES
"Assessment & Plan     Lump of right breast  New lump of right breast adjacent to nipple with mild tenderness and patient noted nipple discharge. No signs and symptoms of infection, swelling or erythema noted. Appears to be more consistent with plugged duct, advised to start warm compresses several times a day to area and if not improving or worsening schedule mammogram and ultrasound to further evaluate.   - MA Diagnostic Digital Bilateral; Future  - US Breast Right Limited 1-3 Quadrants; Future    Discharge from breast  Discharge and lump of right breast as above.   - MA Diagnostic Digital Bilateral; Future  - US Breast Right Limited 1-3 Quadrants; Future             See Patient Instructions    No follow-ups on file.    April Burks, DNP, APRN-CNP   [facility]    Subjective     Stacie Daniels is a 31 year old female who presents today for the following   health issues:    HPI    Breast problem      Duration: 1.5 week ago    Description (location/character/radiation): right breast lump behind nipple and breast discharge yellow    Intensity:  mild    Accompanying signs and symptoms: sensitivity    History (similar episodes/previous evaluation): breast implants a couple months ago    Precipitating or alleviating factors: None    Therapies tried and outcome: None     Had implants a couple of months ago   2 children - no breast feeding   Paternal grandmother with history of breast cancer at older age       Review of Systems    Constitutional, HEENT, cardiovascular, pulmonary, gi and gu systems are negative, except as otherwise noted.    Objective   /62   Pulse 88   Temp 98  F (36.7  C)   Resp 18   Ht 1.568 m (5' 1.75\")   Wt 56.2 kg (124 lb)   LMP  (LMP Unknown)   SpO2 100%   Breastfeeding No   BMI 22.86 kg/m    Body mass index is 22.86 kg/m .    Physical Exam  GENERAL APPEARANCE: healthy, alert and no distress  BREAST: dime sized, non-tender, mobile nodule in right breast " adjacent to nipple in right quadrant without erythema or swelling, no discharge present, and no palpable axillary masses or adenopathy    Diagnostic Test Results:  Pending Mammo and ultrasound      Chart documentation with Dragon Voice recognition Software. Although reviewed after completion, some words and grammatical errors may remain.

## 2021-10-19 ENCOUNTER — HOSPITAL ENCOUNTER (OUTPATIENT)
Dept: MAMMOGRAPHY | Facility: CLINIC | Age: 31
End: 2021-10-19
Attending: NURSE PRACTITIONER
Payer: COMMERCIAL

## 2021-10-19 ENCOUNTER — HOSPITAL ENCOUNTER (OUTPATIENT)
Dept: ULTRASOUND IMAGING | Facility: CLINIC | Age: 31
End: 2021-10-19
Attending: NURSE PRACTITIONER
Payer: COMMERCIAL

## 2021-10-19 DIAGNOSIS — N64.52 DISCHARGE FROM BREAST: ICD-10-CM

## 2021-10-19 DIAGNOSIS — N63.10 LUMP OF RIGHT BREAST: ICD-10-CM

## 2021-10-19 PROCEDURE — 76642 ULTRASOUND BREAST LIMITED: CPT | Mod: RT

## 2021-10-19 PROCEDURE — G0279 TOMOSYNTHESIS, MAMMO: HCPCS

## 2022-04-20 ENCOUNTER — OFFICE VISIT (OUTPATIENT)
Dept: FAMILY MEDICINE | Facility: CLINIC | Age: 32
End: 2022-04-20
Payer: COMMERCIAL

## 2022-04-20 VITALS
BODY MASS INDEX: 24.69 KG/M2 | HEART RATE: 84 BPM | SYSTOLIC BLOOD PRESSURE: 116 MMHG | TEMPERATURE: 98.5 F | HEIGHT: 62 IN | RESPIRATION RATE: 16 BRPM | DIASTOLIC BLOOD PRESSURE: 77 MMHG | OXYGEN SATURATION: 100 % | WEIGHT: 134.2 LBS

## 2022-04-20 DIAGNOSIS — G24.3 ISOLATED CERVICAL DYSTONIA: Primary | ICD-10-CM

## 2022-04-20 DIAGNOSIS — M54.12 CERVICAL RADICULOPATHY: ICD-10-CM

## 2022-04-20 DIAGNOSIS — M50.30 BULGING OF CERVICAL INTERVERTEBRAL DISC: ICD-10-CM

## 2022-04-20 PROCEDURE — 99214 OFFICE O/P EST MOD 30 MIN: CPT | Performed by: STUDENT IN AN ORGANIZED HEALTH CARE EDUCATION/TRAINING PROGRAM

## 2022-04-20 RX ORDER — BACLOFEN 10 MG/1
TABLET ORAL
Qty: 21 TABLET | Refills: 0 | Status: SHIPPED | OUTPATIENT
Start: 2022-04-20 | End: 2023-02-21

## 2022-04-20 ASSESSMENT — PATIENT HEALTH QUESTIONNAIRE - PHQ9: SUM OF ALL RESPONSES TO PHQ QUESTIONS 1-9: 2

## 2022-04-20 ASSESSMENT — PAIN SCALES - GENERAL: PAINLEVEL: SEVERE PAIN (7)

## 2022-04-20 NOTE — PROGRESS NOTES
"  Assessment & Plan    1. Isolated cervical dystonia  32-year old female presenting with complaint of left upper extremity spasm and pain ongoing for 2 days, improving.  Review of medical record showed that patient had an MRI done in 2021 that showed generalized disc bulging and uncinate spurring causing mild  narrowing of the neural foramina bilaterally at the C5-c6.  There was also disc/osteophyte complexes cause foraminal narrowing which at C3-4  Spine.  Patient had a physical therapy sessions which helped and was also referred to orthopedic.  According to patient, he was told by the orthopedic specialist that she was not she \"was no supposed to be there\".  Physical exam as detailed below, no red flag signs.  - XR Cervical Spine 2/3 Views; Future  - baclofen (LIORESAL) 10 MG tablet; Take half a pill tid as needed for spasm  Dispense: 21 tablet; Refill: 0  - Physical Therapy Referral; Future  - Spine Referral; Future  Recommend daily applications of ice (15 minutes every four to six hours) for 3 days.  Over-the-counter Motrin 600 mg every 8 hours for the next 5 days and as needed thereafter.  Neck exercises given  Patient verbalizes understanding and agreed with plan.  All questions answered to patient satisfaction    2. Cervical radiculopathy    - XR Cervical Spine 2/3 Views; Future  - baclofen (LIORESAL) 10 MG tablet; Take half a pill tid as needed for spasm  Dispense: 21 tablet; Refill: 0  - Physical Therapy Referral; Future  - Spine Referral; Future    3. Bulging of cervical intervertebral disc    - XR Cervical Spine 2/3 Views; Future  - baclofen (LIORESAL) 10 MG tablet; Take half a pill tid as needed for spasm  Dispense: 21 tablet; Refill: 0  - Physical Therapy Referral; Future  - Spine Referral; Future  [id number]}     Return in about 1 week (around 4/27/2022) for Follow up, Routine preventive, in person.    Ayanna Murphy MD  [facility] PEDRO Quinones is a 32 year old who " "presents for the following health issues     History of Present Illness       Reason for visit:  Sore arm neck back left side  Symptom onset:  More than a month  Symptoms include:  Painful left shoulder ,neck upper left back w muscle spasms  Symptom intensity:  Moderate  Symptom progression:  Worsening  Had these symptoms before:  Yes  Has tried/received treatment for these symptoms:  Yes  Previous treatment was successful:  No  What makes it worse:  Any physical use arm arm lifting or raising above head  What makes it better:  Soak Baths daily ibuprofen for a short time    She eats 2-3 servings of fruits and vegetables daily.She consumes 1 sweetened beverage(s) daily.She exercises with enough effort to increase her heart rate 20 to 29 minutes per day.  She exercises with enough effort to increase her heart rate 4 days per week.   She is taking medications regularly.       Pain History:  When did you first notice your pain? - Acute Pain; Over a year.   Have you seen anyone else for your pain? No  Where in your body do you have pain? Musculoskeletal problem/pain    Description  Location: Left shoulder   Joint Swelling: YES  Redness: no  Pain: YES  Warmth: YES  Intensity:  mild, moderate, severe  Progression of Symptoms:  worsening  Accompanying signs and symptoms:   Fevers: no  Numbness/tingling/weakness: YES- weakness in left hand.   History  Trauma to the area: no  Recent illness:  no  Previous similar problem: YES  Previous evaluation:  YES, have mild narrowing at physical Therapy.   Precipitating or alleviating factors:  Aggravating factors include: Any movement.   Therapies tried and outcome:  Currently taking Ibuprofen and tylenol for pain - some effect. Patient reports have done PT in the past and has helped. She has tried \"a muscle relaxer\" in the past and that has also helped.           Review of Systems   Constitutional, HEENT, cardiovascular, pulmonary, gi and gu systems are negative, except as otherwise " noted.      Objective    There were no vitals taken for this visit.  There is no height or weight on file to calculate BMI.  Physical Exam   GENERAL: healthy, alert and no distress  NECK: no adenopathy, no asymmetry, masses, or scars and thyroid normal to palpation  RESP: lungs clear to auscultation - no rales, rhonchi or wheezes  CV: regular rate and rhythm, normal S1 S2, no S3 or S4, no murmur, click or rub, no peripheral edema and peripheral pulses strong  ABDOMEN: soft, nontender, no hepatosplenomegaly, no masses and bowel sounds normal  MS: no gross musculoskeletal defects noted, no edema  Right upper extremity is normal  Left upper extremity: Strength is intact, no erythema, no lesion no swelling.  Pain at  The back of the neck and shoulder at approximately 100 degrees with abduction of the left shoulder , normal adduction internal rotation and external rotation

## 2022-04-20 NOTE — PATIENT INSTRUCTIONS
Yossi Quinones,    Thank you for allowing [facility] to manage your care.    I ordered some xrays, please go to our radiology department to get your xrays.    I sent your prescriptions to your pharmacy.      I made a referral, they will be calling in approximately 1 week to set up your appointment.  If you do not hear from them, please call the specialty number on your after visit.     For your convenience, test results are released as soon as they are available  Please allow 1-2 business days for me to send you a comment about your results.  If not done so, I encourage you to login into [employer] ([email or web address] to review your results in real time.     If you have any questions or concerns, please feel free to call us at [phone].    Sincerely,    Dr. Murphy    Did you know?      You can schedule a video visit for follow-up appointments as well as future appointments for certain conditions.  Please see the below link.     [email or web address]    If you have not already done so,  I encourage you to sign up for [employer] ([email or web address]).  This will allow you to review your results, securely communicate with a provider, and schedule virtual visits as well.

## 2022-04-30 ENCOUNTER — HEALTH MAINTENANCE LETTER (OUTPATIENT)
Age: 32
End: 2022-04-30

## 2022-05-05 ENCOUNTER — NURSE TRIAGE (OUTPATIENT)
Dept: NURSING | Facility: CLINIC | Age: 32
End: 2022-05-05
Payer: COMMERCIAL

## 2022-05-05 NOTE — TELEPHONE ENCOUNTER
"  Patient took covid test today and it was positive.  Patient has sore throat, fever, body aches brain fog.      Temp is 102.0 with tylenol/ibuprofen.    Symptoms started on 5/5.    Patient denies any breathing issues, chest pain.    Discussed covid management with OTC medications, cough medications, tylenol/ibuprofen and warm liquids, humidifier, push fluids, rest.    Sent to scheduling to make a virtual covid treatment option appointment.    Sandra Bess RN   05/05/22 5:08 PM  [facility] Nurse Advisor    Coronavirus (COVID-19) Notification    Caller Name (Patient, parent, daughter/son, grandparent, etc)  Stoddard    Reason for call  Notify of Positive Coronavirus (COVID-19) lab results, assess symptoms,  review [facility] recommendations    Lab Result    Lab test:  2019-nCoV rRt-PCR or SARS-CoV-2 PCR    Oropharyngeal AND/OR nasopharyngeal swabs is POSITIVE for 2019-nCoV RNA/SARS-COV-2 PCR (COVID-19 virus)    Brief introduction script  Introduce self then review script:  \"I am calling on behalf of [employer].  We were notified that your Coronavirus test (COVID-19) for was POSITIVE for the virus.\"    Gather patient reported symptoms   Assessment   Current Symptoms at time of phone call, reported by patient: (if no symptoms, document No symptoms] Sore throat, cough, body aches, fever, brain fog   Date of Symptom(s) onset (if applicable) 5/5/2022     If at time of call, Patients symptoms hare worsened, the Patient should contact 911 or have someone drive them to Emergency Dept promptly:      If Patient calling 911, inform 911 personal that you have tested positive for the Coronavirus (COVID-19).  Place mask on and await 911 to arrive.    If Emergency Dept, If possible, please have another adult drive you to the Emergency Dept but you need to wear mask when in contact with other people.      Monoclonal Antibody Administration    You may be eligible to receive a new treatment with a monoclonal " antibody for preventing hospitalization in patients at high risk for complications from COVID-19. This medication is still experimental and available on a limited basis; it is given through an IV and must be given at an infusion center. Please note that not all people who are eligible will receive the medication since it is in limited supply.  Is the patient symptomatic and onset of symptoms within the last 7 days?  Yes  Is the patient interested in a visit with a provider to discuss treatment options?: Yes  Is the patient seen at [facility]?  No: Warm transfer caller to [phone] to be scheduled with a virtual urgent provider.  During transfer, instruct  on appropriate time frame for visit     Review information with Patient    Your result was positive. This means you have COVID-19 (coronavirus).      How can I protect others?    These guidelines are for isolating before returning to work, school or .       If you DO have symptoms:  o Stay home and away from others  - For at least 5 days after your symptoms started, AND   - You are fever free for 24 hours (with no medicine that reduces fever), AND  - Your other symptoms are better.  o Wear a mask for 10 full days any time you are around others.    If you DON'T have symptoms:  o Stay at home and away from others for at least 5 days after your positive test.  o Wear a mask for 10 full days any time you are around others.    There may be different guidelines for healthcare facilities. Please check with the specific sites before arriving.     If you've been told by a doctor that you were severely ill with COVID-19 or are immunocompromised, you should isolate for at least 10 days.    You should not go back to work until you meet the guidelines above for ending your home isolation. You don't need to be retested for COVID-19 before going back to work--studies show that you won't spread the virus if it's been at least 10 days since your  symptoms started (or 20 days, if you have a weak immune system).    Employers, schools, and daycares: This is an official notice for this person's medical guidelines for returning in-person. They must meet the above guidelines before going back to work, school, or  in person.    You will receive a positive COVID-19 letter via [facility] or the mail soon with additional self-care information (exception, no letters sent to presurgical/preprocedure patients).     Would you like me to review some of that information with you now?  Yes    How can I take care of myself?      Get lots of rest. Drink extra fluids (unless a doctor has told you not to).      Take Tylenol (acetaminophen) for fever or pain. If you have liver or kidney problems, ask your family doctor if it's okay to take Tylenol.     Take either:     650 mg (two 325 mg pills) every 4 to 6 hours, or     1,000 mg (two 500 mg pills) every 8 hours as needed.     Note: Do not take more than 3,000 mg in one day. Acetaminophen is found in many medicines (both prescribed and over-the-counter medicines). Read all labels to be sure you don't take too much.    For children, check the Tylenol bottle for the right dose (based on their age or weight).      If you have other health problems (like cancer, heart failure, an organ transplant or severe kidney disease): Call your specialty clinic if you don't feel better in the next 2 days.      Know when to call 911: Emergency warning signs include:    Trouble breathing or shortness of breath    Pain or pressure in the chest that doesn't go away    Feeling confused like you haven't felt before, or not being able to wake up    Bluish-colored lips or face        If you were tested for an upcoming procedure, please contact your provider for next steps.     Sandra Bess RN          Reason for Disposition    [1] COVID-19 diagnosed by positive lab test (e.g., PCR, rapid self-test kit) AND [2] mild symptoms (e.g., cough,  fever, others) AND [3] no complications or SOB    Additional Information    Negative: SEVERE difficulty breathing (e.g., struggling for each breath, speaks in single words)    Negative: Difficult to awaken or acting confused (e.g., disoriented, slurred speech)    Negative: Bluish (or gray) lips or face now    Negative: Shock suspected (e.g., cold/pale/clammy skin, too weak to stand, low BP, rapid pulse)    Negative: Sounds like a life-threatening emergency to the triager    Negative: [1] Diagnosed or suspected COVID-19 AND [2] symptoms lasting 3 or more weeks    Negative: [1] COVID-19 exposure AND [2] no symptoms    Negative: COVID-19 vaccine reaction suspected (e.g., fever, headache, muscle aches) occurring 1 to 3 days after getting vaccine    Negative: COVID-19 vaccine, questions about    Negative: [1] Lives with someone known to have influenza (flu test positive) AND [2] flu-like symptoms (e.g., cough, runny nose, sore throat, SOB; with or without fever)    Negative: [1] Adult with possible COVID-19 symptoms AND [2] triager concerned about severity of symptoms or other causes    Negative: COVID-19 and breastfeeding, questions about    Negative: SEVERE or constant chest pain or pressure  (Exception: Mild central chest pain, present only when coughing.)    Negative: MODERATE difficulty breathing (e.g., speaks in phrases, SOB even at rest, pulse 100-120)    Negative: [1] Headache AND [2] stiff neck (can't touch chin to chest)    Negative: Oxygen level (e.g., pulse oximetry) 90 percent or lower    Negative: Chest pain or pressure    Negative: Patient sounds very sick or weak to the triager    Negative: MILD difficulty breathing (e.g., minimal/no SOB at rest, SOB with walking, pulse <100)    Negative: Fever > 103 F (39.4 C)    Negative: [1] Fever > 101 F (38.3 C) AND [2] age > 60 years    Negative: [1] Fever > 100.0 F (37.8 C) AND [2] bedridden (e.g., nursing home patient, CVA, chronic illness, recovering from  surgery)    Negative: HIGH RISK for severe COVID complications (e.g., weak immune system, age > 64 years, obesity with BMI > 25, pregnant, chronic lung disease or other chronic medical condition)  (Exception: Already seen by PCP and no new or worsening symptoms.)    Negative: [1] HIGH RISK patient AND [2] influenza is widespread in the community AND [3] ONE OR MORE respiratory symptoms: cough, sore throat, runny or stuffy nose    Negative: [1] HIGH RISK patient AND [2] influenza exposure within the last 7 days AND [3] ONE OR MORE respiratory symptoms: cough, sore throat, runny or stuffy nose    Negative: Oxygen level (e.g., pulse oximetry) 91 to 94 percent    Negative: Fever present > 3 days (72 hours)    Negative: [1] Fever returns after gone for over 24 hours AND [2] symptoms worse or not improved    Negative: [1] Continuous (nonstop) coughing interferes with work or school AND [2] no improvement using cough treatment per Care Advice    Negative: [1] COVID-19 infection suspected by caller or triager AND [2] mild symptoms (cough, fever, or others) AND [3] negative COVID-19 rapid test    Negative: Cough present > 3 weeks    Negative: [1] COVID-19 diagnosed by positive lab test (e.g., PCR, rapid self-test kit) AND [2] NO symptoms (e.g., cough, fever, others)    Protocols used: CORONAVIRUS (COVID-19) DIAGNOSED OR [id number] 1.18.2022

## 2022-05-07 ENCOUNTER — VIRTUAL VISIT (OUTPATIENT)
Dept: URGENT CARE | Facility: CLINIC | Age: 32
End: 2022-05-07
Payer: COMMERCIAL

## 2022-05-07 DIAGNOSIS — U07.1 INFECTION DUE TO 2019 NOVEL CORONAVIRUS: Primary | ICD-10-CM

## 2022-05-07 PROCEDURE — 99213 OFFICE O/P EST LOW 20 MIN: CPT | Mod: 95

## 2022-05-07 NOTE — PROGRESS NOTES
"The patient has been notified of following:     \"This telephone visit will be conducted via a call between you and your physician/provider. We have found that certain health care needs can be provided without the need for a physical exam.  This service lets us provide the care you need with a short phone conversation.  If a prescription is necessary we can send it directly to your pharmacy.  If lab work is needed we can place an order for that and you can then stop by our lab to have the test done at a later time.    Telephone visits are billed at different rates depending on your insurance coverage. During this emergency period, for some insurers they may be billed the same as an in-person visit.  Please reach out to your insurance provider with any questions.    If during the course of the call the physician/provider feels a telephone visit is not appropriate, you will not be charged for this service.\"      Assessment & Plan:       See patient instructions below.  At the end of the encounter, I discussed diagnosis & medications. Discussed red flags for being seen in person at clinic/ER, as well as indications for follow up if no improvement. Patient understood and agreed to plan.       COVID POSITIVE for 4 days    Patient is fully vaccinated    Rest, fluids, isolation  Discussed treatment options and unlikely appropriate given her status  Red flags and emergent follow up discussed, and understood by patient  Follow up with PCP if symptoms worsen or fail to improve        Phone Call Duration : 15  minutes    Carroll Alfredo PA-C , JONNY VALADEZ  [place] [facility]NSCH[facility]ULED [facility]  -----------------------------------------------------------------------------------------------------------------------------------------------------------------    Subjective:   Stacie Daniels is a 32 year old female who is contacted via telephone through scheduled urgent care virtual visit to discuss:COVID with body aches 4 days ago.  Days " 1,2 were pretty bad, but now feels nearly completely better    No treatments tried. Patient reports no fever/chills, headache, chest pain, shortness of breath, abdominal pain, nausea, vomiting, diarrhea, rash, or any other symptoms.     Past Medical History:   Diagnosis Date     Anxiety      Chickenpox      Febrile convulsion (H)     toddler     Major depressive disorder, recurrent episode, mild (H)     off zoloft     Ulcer     age 20         Objective:   Gen:  NAD  Pulm: non-labored work of breathing

## 2022-07-15 NOTE — NURSING NOTE
"Initial /72 (BP Location: Left arm, Patient Position: Chair, Cuff Size: Adult Regular)  Pulse 92  Temp 97.9  F (36.6  C) (Tympanic)  Resp 16  Ht 5' 2\" (1.575 m)  Wt 120 lb (54.4 kg)  LMP  (LMP Unknown)  Breastfeeding? No  BMI 21.95 kg/m2 Estimated body mass index is 21.95 kg/(m^2) as calculated from the following:    Height as of this encounter: 5' 2\" (1.575 m).    Weight as of this encounter: 120 lb (54.4 kg). .    Dottie Major, AGNIESZKA    " no

## 2022-08-02 ENCOUNTER — VIRTUAL VISIT (OUTPATIENT)
Dept: FAMILY MEDICINE | Facility: CLINIC | Age: 32
End: 2022-08-02
Payer: COMMERCIAL

## 2022-08-02 DIAGNOSIS — R11.2 NON-INTRACTABLE VOMITING WITH NAUSEA, UNSPECIFIED VOMITING TYPE: ICD-10-CM

## 2022-08-02 DIAGNOSIS — F41.9 ANXIETY: Primary | ICD-10-CM

## 2022-08-02 DIAGNOSIS — F41.0 PANIC ATTACK: ICD-10-CM

## 2022-08-02 PROCEDURE — 99214 OFFICE O/P EST MOD 30 MIN: CPT | Mod: 95 | Performed by: PHYSICIAN ASSISTANT

## 2022-08-02 RX ORDER — ONDANSETRON 4 MG/1
4 TABLET, ORALLY DISINTEGRATING ORAL EVERY 12 HOURS PRN
Qty: 10 TABLET | Refills: 0 | Status: SHIPPED | OUTPATIENT
Start: 2022-08-02 | End: 2023-02-21

## 2022-08-02 RX ORDER — HYDROXYZINE HYDROCHLORIDE 25 MG/1
25-50 TABLET, FILM COATED ORAL
Qty: 30 TABLET | Refills: 0 | Status: SHIPPED | OUTPATIENT
Start: 2022-08-02 | End: 2022-11-04

## 2022-08-02 RX ORDER — SERTRALINE HYDROCHLORIDE 25 MG/1
25 TABLET, FILM COATED ORAL DAILY
Qty: 90 TABLET | Refills: 0 | Status: SHIPPED | OUTPATIENT
Start: 2022-08-02 | End: 2023-02-21

## 2022-08-02 ASSESSMENT — ANXIETY QUESTIONNAIRES
GAD7 TOTAL SCORE: 20
2. NOT BEING ABLE TO STOP OR CONTROL WORRYING: NEARLY EVERY DAY
GAD7 TOTAL SCORE: 20
IF YOU CHECKED OFF ANY PROBLEMS ON THIS QUESTIONNAIRE, HOW DIFFICULT HAVE THESE PROBLEMS MADE IT FOR YOU TO DO YOUR WORK, TAKE CARE OF THINGS AT HOME, OR GET ALONG WITH OTHER PEOPLE: EXTREMELY DIFFICULT
1. FEELING NERVOUS, ANXIOUS, OR ON EDGE: NEARLY EVERY DAY
7. FEELING AFRAID AS IF SOMETHING AWFUL MIGHT HAPPEN: MORE THAN HALF THE DAYS
6. BECOMING EASILY ANNOYED OR IRRITABLE: NEARLY EVERY DAY
5. BEING SO RESTLESS THAT IT IS HARD TO SIT STILL: NEARLY EVERY DAY
3. WORRYING TOO MUCH ABOUT DIFFERENT THINGS: NEARLY EVERY DAY

## 2022-08-02 ASSESSMENT — PATIENT HEALTH QUESTIONNAIRE - PHQ9
SUM OF ALL RESPONSES TO PHQ QUESTIONS 1-9: 13
5. POOR APPETITE OR OVEREATING: NEARLY EVERY DAY

## 2022-08-02 NOTE — COMMUNITY RESOURCES LIST (ENGLISH)
08/02/2022   [facility] - Outpatient Clinics  N/A  For questions about this resource list or additional care needs, please contact your primary care clinic or care manager.  Phone: [phone]   Email: N/A   Address: [address] [place] [id number]   Hours: N/A        Hotlines and Helplines       Hotline - Crisis help  1  [facility] Distance: 4.73 miles      COVID-19 Status: Phone/Virtual   [address] [place] [address]  Language: English  Hours: Mon - Sun Open 24 Hours   Phone: (776) [phone] Email: ian@[facility] Website: [email or web address]/Family-Services     2  [facility] Crisis Services - Mobile Crisis Response Distance: 4.76 miles      COVID-19 Status: Phone/Virtual   [address] [place] [address]  Language: English  Hours: Mon - Sun Open 24 Hours  Fees: Insurance   Phone: (841) 668-8194 Email: info@[employer]igital Website: https://www.[employer].org/location/gowcuxqzu-fvfitn-bsepfd-services-only/          Mental Health       Individual counseling  3  [facility] Distance: 4.73 miles      COVID-19 Status: Regular Operations, COVID-19 Status: Phone/Virtual   [address] [place] [address]  Language: English  Hours: Mon - Fri 8:00 AM - 5:00 PM  Fees: Insurance, Self Pay   Phone: (191) 692-5504 Email: info@[employer] Website: https://[employer].FirstRain/     4  [facility] [place] [facility] Distance: 5.7 miles      COVID-19 Status: Regular Operations, COVID-19 Status: Phone/Virtual   [address] [place] [address]  Language: English  Hours: Mon - Fri 8:00 AM - 8:00 PM  Fees: Insurance, Self Pay   Phone: (219) 249-7325 Website: http://www.[employer].gisele     Mental health crisis care  5  [facility] Distance:  15.53 miles      COVID-19 Status: Regular Operations, COVID-19 Status: Phone/Virtual   [address] [address]  Language: English  Hours: Mon - Sun Open 24 Hours  Fees: Insurance, Self Pay, Sliding Fee   Phone: (575) 445-4339 Email: info@[employer] Website: https://www.[employer]/location/[facility]/          Important Numbers & Websites       Emergency Services   911  [facility]   311  Poison Control   (929) 441-7533  Suicide Prevention Lifeline   (894) 560-9592 (TALK)  Child Abuse Hotline   (152) 288-4081 (4-A-Child)  Sexual Assault Hotline   (870) 976-8008 (HOPE)  National Runaway Safeline   (908) 816-1934 (RUNAWAY)  All-Options Talkline   (715) 626-9762  Substance Abuse Referral   (448) 976-5985 (HELP)

## 2022-08-02 NOTE — PATIENT INSTRUCTIONS
Yossi Quinones,     For treatment of anxiety, you have been prescribed daily Zoloft. For panic you can you prescribed Hydroxyzine. Keep in mind that this medication can make you drowsy, so do not work or drive after taking the medication for at lease 6 hours. Please continue with therapy as planned. I have added some helpful handouts about anxiety for review.     For nausea you have been prescribed a small amount of Zofran. This is not intended for regular or long term use.     I would like you to schedule a follow up with Dr. Hargrove for follow up and a recheck within the next 4 weeks. Follow up sooner for any new or worsening symptoms. Reach out with questions or concerns. If you are ever in need of talking to someone immediately, you can use the crisis information provided below.     Take Care,  Juliet Marrero PA-C     In an emergency--call 911  Don't leave the person alone. Anyone who is at imminent risk of suicide needs psychiatric services right away. The person must be constantly watched, and never left out of sight. Call 911 or a 24-hour suicide crisis hotline. You can search for this online. You can also take the person to the nearest hospital emergency room.     Don't keep it a secret and don't wait  Call a mental health clinic or a licensed mental health professional in your area right away. This may be a psychiatrist, clinical psychologist, psychiatric or licensed clinical , marriage and family counselor, or clergy. If you don't know how to contact such professionals and there is an immediate risk, call 911. Tell them you need help for a person who is thinking about suicide.     Resources  National Suicide Prevention Shzsuiaw375-089-9693 (016-727-AWEK)www.suicidepreventionlifeline.org  National Suicide Toahaxc035-939-6609 (800-SUICIDE)  National [place] of Mental Njkxfv665-434-9381wsv.nimh.nih.gov  National [place] on Mental Oebtgub275-702-4690jhi.antione.org  Mental Health  Ipnzjpu620-232-4631yej.[facility].org

## 2022-08-02 NOTE — PROGRESS NOTES
Stacie is a 32 year old who is being evaluated via a billable telephone visit.      What phone number would you like to be contacted at? [phone]  How would you like to obtain your AVS? [employer]    Assessment & Plan     Anxiety  Panic attack  Non-intractable vomiting with nausea, unspecified vomiting type  Patient is a 32-year-old female who presents to telephone visit due to worsening anxiety and panic causing vomiting and nausea in the morning related to stressful life events.  No SI.  Discussed treatment options.  We will start patient on daily sertraline.  Hydroxyzine provided for panic.  Patient requested Zofran due to extent of nausea/vomiting.  Discussed that this medication is not intended for long-term use and small quantity provided.  Discussed the importance of establishing with a primary care provider and scheduling visit within the next 3 to 4 weeks for recheck and follow-up.  Recommended sooner follow-up for any new or worsening symptoms.  Crisis information provided.    - sertraline (ZOLOFT) 25 MG tablet; Take 1 tablet (25 mg) by mouth daily  - hydrOXYzine (ATARAX) 25 MG tablet; Take 1-2 tablets (25-50 mg) by mouth nightly as needed for anxiety (panic)  - ondansetron (ZOFRAN ODT) 4 MG ODT tab; Take 1 tablet (4 mg) by mouth every 12 hours as needed for nausea       See Patient Instructions    Return in about 3 weeks (around 8/23/2022) for Reevaluation with primary care provider .    Juliet Marrero PA-C  [facility] PEDRO Quinones is a 32 year old, presenting for the following health issues:  Anxiety and Nausea      HPI     *NAUSEA getting worse over the past couple weeks. Vomiting almost every day in the morning after waking up.  Patient notes stressful events in life recently causing severe anxiety in the morning causing severe nausea and vomiting. She is unable to eat until around 11 am.     Stressful events: Patient has full custody of daughter, but father moved back  from [place] and is taking patient to court for changes in custody.     Patient has history of anxiety and PTSD and has been on medications in the past. She took Hydroxyzine in the past which worked well. Zofran used for nausea in the past. Zoloft used intermittently and worked well. Patient is currently in therapy.      Patient denies chance of pregnancy.     Anxiety Follow-Up    How are you doing with your anxiety since your last visit? Worsened     Are you having other symptoms that might be associated with anxiety? No    Have you had a significant life event? OTHER: COURT     Are you feeling depressed? No    Do you have any concerns with your use of alcohol or other drugs? No    Social History     Tobacco Use     Smoking status: Former Smoker     Packs/day: 0.00     Smokeless tobacco: Never Used   Vaping Use     Vaping Use: Never used   Substance Use Topics     Alcohol use: No     Comment: rarely     Drug use: No     CHARMAINE-7 SCORE 3/2/2020 12/3/2020 8/2/2022   Total Score - - -   Total Score 19 19 20     PHQ 12/3/2020 4/20/2022 8/2/2022   PHQ-9 Total Score 24 2 13   Q9: Thoughts of better off dead/self-harm past 2 weeks More than half the days Not at all Not at all     Last PHQ-9 8/2/2022   1.  Little interest or pleasure in doing things 1   2.  Feeling down, depressed, or hopeless 0   3.  Trouble falling or staying asleep, or sleeping too much 3   4.  Feeling tired or having little energy 0   5.  Poor appetite or overeating 3   6.  Feeling bad about yourself 0   7.  Trouble concentrating 3   8.  Moving slowly or restless 3   Q9: Thoughts of better off dead/self-harm past 2 weeks 0   PHQ-9 Total Score 13   Difficulty at work, home, or with people Extremely dIfficult     CHARMAINE-7  8/2/2022   1. Feeling nervous, anxious, or on edge 3   2. Not being able to stop or control worrying 3   3. Worrying too much about different things 3   4. Trouble relaxing 3   5. Being so restless that it is hard to sit still 3   6. Becoming  easily annoyed or irritable 3   7. Feeling afraid, as if something awful might happen 2   CHARMAINE-7 Total Score 20   If you checked any problems, how difficult have they made it for you to do your work, take care of things at home, or get along with other people? Extremely difficult           Objective           Vitals:  No vitals were obtained today due to virtual visit.    Physical Exam   healthy, alert and no distress  PSYCH: Alert and oriented times 3; coherent speech, normal   rate and volume, able to articulate logical thoughts, able   to abstract reason, no tangential thoughts, no hallucinations   or delusions  Her affect is normal  RESP: No cough, no audible wheezing, able to talk in full sentences  Remainder of exam unable to be completed due to telephone visits                Phone call duration: 15 minutes    .  ..

## 2022-08-29 ENCOUNTER — PATIENT OUTREACH (OUTPATIENT)
Dept: FAMILY MEDICINE | Facility: CLINIC | Age: 32
End: 2022-08-29

## 2022-08-29 NOTE — LETTER
October 12, 2022    To  Stacie Daniels  [address] [facility] [address]      If you have completed these tests at another facility, please call your clinic with the details about when, where, and the results, or have your records sent to our clinic so that we can best coordinate your care.     You are in particular need of attention regarding the following:     PREVENTATIVE VISIT: Physical    If you have already completed these items, please contact the clinic via phone or   MyChart so your care team can review and update your records. Thank you for   choosing [facility] for your healthcare needs. For any questions,   concerns, or to schedule an appointment please contact our clinic.    Healthy Regards,      Your [facility] Care Team

## 2022-08-29 NOTE — TELEPHONE ENCOUNTER
Patient Quality Outreach    Patient is due for the following:   Diabetes -  Eye Exam  Cervical Cancer Screening - PAP Needed  Physical Preventive Adult Physical      Topic Date Due     COVID-19 Vaccine (3 - Booster for Pfizer series) 03/05/2022     Flu Vaccine (1) 09/01/2022       Next Steps:   Schedule a Adult Preventative    Type of outreach:    Sent [employer] message.    Next Steps:  Reach out within 90 days via Letter.    Max number of attempts reached: No. Will try again in 90 days if patient still on fail list.    LXIONG3, MEDICAL ASSISTANT

## 2022-10-12 NOTE — TELEPHONE ENCOUNTER
Patient Quality Outreach    Patient is due for the following:   Diabetes -  Eye Exam  Cervical Cancer Screening - PAP Needed  Physical Preventive Adult Physical           Topic Date Due     COVID-19 Vaccine (3 - Booster for Pfizer series) 03/05/2022     Flu Vaccine (1) 09/01/2022         Next Steps:   Schedule a Adult Preventative      Type of outreach:    Sent letter.        Max number of attempts reached: Yes. Will try again in 90 days if patient still on fail list.    Questions for provider review:    None     Closing Encounter.   LXIONG3, MEDICAL ASSISTANT

## 2022-11-04 DIAGNOSIS — F41.0 PANIC ATTACK: ICD-10-CM

## 2022-11-04 RX ORDER — HYDROXYZINE HYDROCHLORIDE 25 MG/1
25-50 TABLET, FILM COATED ORAL
Qty: 30 TABLET | Refills: 2 | Status: SHIPPED | OUTPATIENT
Start: 2022-11-04 | End: 2023-02-21

## 2022-11-20 ENCOUNTER — HEALTH MAINTENANCE LETTER (OUTPATIENT)
Age: 32
End: 2022-11-20

## 2023-02-09 NOTE — PROGRESS NOTES
2/8/2023  Continue on plavix.    Chief Complaint   Patient presents with     Annual Eye Exam      Accompanied by mom and infant daughter       Last Eye Exam: 1/25/2017  Dilated Previously: Yes    What are you currently using to see?  Glasses, for distance only        Distance Vision Acuity: Noticed gradual change in both eyes, even feels like that with her glasses there is a change     Near Vision Acuity: Satisfied with vision while reading and using computer unaided    Eye Comfort: good, do get a strained feeling when not wearing her glasses , or after driving for a while when looking for signs to get to someplace new    Do you use eye drops? 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       OBJECTIVE: See Ophthalmology exam    ASSESSMENT:    ICD-10-CM    1. Myopia of both eyes H52.13 EYE EXAM (SIMPLE-NONBILLABLE)     REFRACTION   2. Regular astigmatism of both eyes H52.223 EYE EXAM (SIMPLE-NONBILLABLE)     REFRACTION      PLAN:     Patient Instructions   Patient was advised of today's exam findings.  Optional to fill new glasses prescription, minimal change  Call for new contact lenses fit- in interested within the next 6 months   Return in 1 year for eye exam    Yumi Tran O.D.  [facility]   [address], [place] [id number] [phone]

## 2023-02-21 ENCOUNTER — OFFICE VISIT (OUTPATIENT)
Dept: FAMILY MEDICINE | Facility: CLINIC | Age: 33
End: 2023-02-21
Payer: COMMERCIAL

## 2023-02-21 VITALS
DIASTOLIC BLOOD PRESSURE: 60 MMHG | OXYGEN SATURATION: 99 % | BODY MASS INDEX: 27.42 KG/M2 | RESPIRATION RATE: 16 BRPM | TEMPERATURE: 97.5 F | SYSTOLIC BLOOD PRESSURE: 118 MMHG | HEART RATE: 80 BPM | WEIGHT: 149 LBS | HEIGHT: 62 IN

## 2023-02-21 DIAGNOSIS — Z00.00 ROUTINE GENERAL MEDICAL EXAMINATION AT A HEALTH CARE FACILITY: ICD-10-CM

## 2023-02-21 DIAGNOSIS — L70.0 ACNE VULGARIS: ICD-10-CM

## 2023-02-21 DIAGNOSIS — F51.01 PRIMARY INSOMNIA: ICD-10-CM

## 2023-02-21 DIAGNOSIS — Z12.4 CERVICAL CANCER SCREENING: Primary | ICD-10-CM

## 2023-02-21 LAB
ERYTHROCYTE [DISTWIDTH] IN BLOOD BY AUTOMATED COUNT: 12 % (ref 10–15)
HCT VFR BLD AUTO: 42 % (ref 35–47)
HGB BLD-MCNC: 13.9 G/DL (ref 11.7–15.7)
MCH RBC QN AUTO: 30.5 PG (ref 26.5–33)
MCHC RBC AUTO-ENTMCNC: 33.1 G/DL (ref 31.5–36.5)
MCV RBC AUTO: 92 FL (ref 78–100)
PLATELET # BLD AUTO: 234 10E3/UL (ref 150–450)
RBC # BLD AUTO: 4.55 10E6/UL (ref 3.8–5.2)
WBC # BLD AUTO: 5.7 10E3/UL (ref 4–11)

## 2023-02-21 PROCEDURE — 84443 ASSAY THYROID STIM HORMONE: CPT | Performed by: NURSE PRACTITIONER

## 2023-02-21 PROCEDURE — 99395 PREV VISIT EST AGE 18-39: CPT | Performed by: NURSE PRACTITIONER

## 2023-02-21 PROCEDURE — 36415 COLL VENOUS BLD VENIPUNCTURE: CPT | Performed by: NURSE PRACTITIONER

## 2023-02-21 PROCEDURE — 99213 OFFICE O/P EST LOW 20 MIN: CPT | Mod: 25 | Performed by: NURSE PRACTITIONER

## 2023-02-21 PROCEDURE — 87624 HPV HI-RISK TYP POOLED RSLT: CPT | Performed by: NURSE PRACTITIONER

## 2023-02-21 PROCEDURE — G0145 SCR C/V CYTO,THINLAYER,RESCR: HCPCS | Performed by: NURSE PRACTITIONER

## 2023-02-21 PROCEDURE — 85027 COMPLETE CBC AUTOMATED: CPT | Performed by: NURSE PRACTITIONER

## 2023-02-21 PROCEDURE — 80048 BASIC METABOLIC PNL TOTAL CA: CPT | Performed by: NURSE PRACTITIONER

## 2023-02-21 RX ORDER — CLINDAMYCIN PHOSPHATE 10 UG/ML
LOTION TOPICAL 2 TIMES DAILY
Qty: 60 ML | Refills: 1 | Status: SHIPPED | OUTPATIENT
Start: 2023-02-21 | End: 2024-05-21

## 2023-02-21 RX ORDER — HYDROXYZINE HYDROCHLORIDE 50 MG/1
50 TABLET, FILM COATED ORAL 3 TIMES DAILY PRN
Qty: 90 TABLET | Refills: 1 | Status: SHIPPED | OUTPATIENT
Start: 2023-02-21 | End: 2024-05-21

## 2023-02-21 ASSESSMENT — PAIN SCALES - GENERAL: PAINLEVEL: NO PAIN (0)

## 2023-02-21 ASSESSMENT — ENCOUNTER SYMPTOMS
SORE THROAT: 0
NAUSEA: 0
MYALGIAS: 1
DYSURIA: 0
ABDOMINAL PAIN: 1
FEVER: 0
NERVOUS/ANXIOUS: 1
JOINT SWELLING: 0
PALPITATIONS: 0
SHORTNESS OF BREATH: 0
PARESTHESIAS: 0
WEAKNESS: 0
DIARRHEA: 0
DIZZINESS: 0
FREQUENCY: 0
EYE PAIN: 0
CHILLS: 0
HEMATURIA: 0
HEARTBURN: 0
ARTHRALGIAS: 0
BREAST MASS: 0
COUGH: 0
HEMATOCHEZIA: 0
HEADACHES: 0
CONSTIPATION: 0

## 2023-02-21 ASSESSMENT — PATIENT HEALTH QUESTIONNAIRE - PHQ9
SUM OF ALL RESPONSES TO PHQ QUESTIONS 1-9: 7
10. IF YOU CHECKED OFF ANY PROBLEMS, HOW DIFFICULT HAVE THESE PROBLEMS MADE IT FOR YOU TO DO YOUR WORK, TAKE CARE OF THINGS AT HOME, OR GET ALONG WITH OTHER PEOPLE: SOMEWHAT DIFFICULT
SUM OF ALL RESPONSES TO PHQ QUESTIONS 1-9: 7

## 2023-02-21 ASSESSMENT — ANXIETY QUESTIONNAIRES
IF YOU CHECKED OFF ANY PROBLEMS ON THIS QUESTIONNAIRE, HOW DIFFICULT HAVE THESE PROBLEMS MADE IT FOR YOU TO DO YOUR WORK, TAKE CARE OF THINGS AT HOME, OR GET ALONG WITH OTHER PEOPLE: SOMEWHAT DIFFICULT
GAD7 TOTAL SCORE: 9
4. TROUBLE RELAXING: MORE THAN HALF THE DAYS
5. BEING SO RESTLESS THAT IT IS HARD TO SIT STILL: SEVERAL DAYS
7. FEELING AFRAID AS IF SOMETHING AWFUL MIGHT HAPPEN: NOT AT ALL
3. WORRYING TOO MUCH ABOUT DIFFERENT THINGS: MORE THAN HALF THE DAYS
6. BECOMING EASILY ANNOYED OR IRRITABLE: NOT AT ALL
8. IF YOU CHECKED OFF ANY PROBLEMS, HOW DIFFICULT HAVE THESE MADE IT FOR YOU TO DO YOUR WORK, TAKE CARE OF THINGS AT HOME, OR GET ALONG WITH OTHER PEOPLE?: SOMEWHAT DIFFICULT
7. FEELING AFRAID AS IF SOMETHING AWFUL MIGHT HAPPEN: NOT AT ALL
GAD7 TOTAL SCORE: 9
2. NOT BEING ABLE TO STOP OR CONTROL WORRYING: MORE THAN HALF THE DAYS
1. FEELING NERVOUS, ANXIOUS, OR ON EDGE: MORE THAN HALF THE DAYS
GAD7 TOTAL SCORE: 9

## 2023-02-21 NOTE — PROGRESS NOTES
SUBJECTIVE:   CC: Stacie is an 32 year old who presents for preventive health visit.     Patient has been advised of split billing requirements and indicates understanding: Yes  Healthy Habits:     Getting at least 3 servings of Calcium per day:  Yes    Bi-annual eye exam:  NO    Dental care twice a year:  Yes    Sleep apnea or symptoms of sleep apnea:  None    Diet:  Regular (no restrictions)    Frequency of exercise:  2-3 days/week    Duration of exercise:  45-60 minutes    Taking medications regularly:  Yes    Medication side effects:  Not applicable    PHQ-2 Total Score: 0    Additional concerns today:  Yes (left side pain)          Today's PHQ-2 Score:   PHQ-2 ( 1999 Pfizer) 2/21/2023   Q1: Little interest or pleasure in doing things 0   Q2: Feeling down, depressed or hopeless 0   PHQ-2 Score 0   PHQ-2 Total Score (12-17 Years)- Positive if 3 or more points; Administer PHQ-A if positive -   Q1: Little interest or pleasure in doing things Not at all   Q2: Feeling down, depressed or hopeless Not at all   PHQ-2 Score 0           Social History     Tobacco Use     Smoking status: Former     Packs/day: 0.00     Types: Cigarettes     Smokeless tobacco: Never   Substance Use Topics     Alcohol use: No     Comment: rarely     If you drink alcohol do you typically have >3 drinks per day or >7 drinks per week? No    Alcohol Use 2/21/2023   Prescreen: >3 drinks/day or >7 drinks/week? No       Reviewed orders with patient.  Reviewed health maintenance and updated orders accordingly - Yes  BP Readings from Last 3 Encounters:   02/21/23 118/60   04/20/22 116/77   10/13/21 106/62    Wt Readings from Last 3 Encounters:   02/21/23 67.6 kg (149 lb)   04/20/22 60.9 kg (134 lb 3.2 oz)   10/13/21 56.2 kg (124 lb)                  Patient Active Problem List   Diagnosis     CARDIOVASCULAR SCREENING; LDL GOAL LESS THAN 160     ADHD (attention deficit hyperactivity disorder)     Generalized anxiety disorder     Sciatica      Hyperemesis arising during pregnancy     PTSD (post-traumatic stress disorder)     Prenatal care, subsequent pregnancy     Fetal arrhythmia before the onset of labor     S/P tubal ligation     Past Surgical History:   Procedure Laterality Date     AS ENLARGE BREAST WITH IMPLANT       FOREIGN BODY REMOVAL      metal from her eye as a child     LAPAROTOMY MINI, TUBAL LIGATION (POST PARTUM), COMBINED Bilateral 12/22/2018    Procedure: COMBINED LAPAROTOMY MINI, TUBAL LIGATION (POST PARTUM);  Surgeon: Helen Smallwood MD;  Location: [facility]       Social History     Tobacco Use     Smoking status: Former     Packs/day: 0.00     Types: Cigarettes     Smokeless tobacco: Never   Substance Use Topics     Alcohol use: No     Comment: rarely     Family History   Problem Relation Age of Onset     C.A.D. Father      Cancer Father         lymphoma     Prostate Cancer Father      Cancer Maternal Grandmother         rectal     Depression Maternal Grandmother      Cerebrovascular Disease Maternal Grandmother      Prostate Cancer Maternal Grandfather      Breast Cancer Paternal Grandmother      Hypertension Paternal Grandmother      Depression Paternal Grandmother      Thyroid Disease Paternal Grandmother      Hypertension Paternal Grandfather      Macular Degeneration Paternal Grandfather      Depression Mother      Bipolar Disorder Mother      Depression Sister      Bipolar Disorder Sister      Depression Sister      Schizophrenia Maternal Uncle      Suicide Paternal Aunt      Macular Degeneration Other      Diabetes No family hx of      Glaucoma No family hx of          Current Outpatient Medications   Medication Sig Dispense Refill     acetaminophen (TYLENOL) 500 MG tablet Take 500 mg by mouth every 6 hours as needed for mild pain       hydrOXYzine (ATARAX) 25 MG tablet Take 1 tablet (25 mg) by mouth every 8 hours as needed for anxiety or other (sleep) 60 tablet 1     ondansetron (ZOFRAN ODT) 4 MG ODT tab Take 1 tablet (4 mg) by  mouth every 12 hours as needed for nausea 10 tablet 0     baclofen (LIORESAL) 10 MG tablet Take half a pill tid as needed for spasm (Patient not taking: Reported on 8/2/2022) 21 tablet 0     diazepam (VALIUM) 5 MG tablet Take 1 tablet (5 mg) by mouth every 6 hours as needed for muscle spasms 20 tablet 0     hydrOXYzine (ATARAX) 25 MG tablet Take 1-2 tablets (25-50 mg) by mouth nightly as needed for anxiety (panic) 30 tablet 2     LORazepam (ATIVAN) 0.5 MG tablet Take one half to one tablet every 8 hours as needed for fear of flying 4 tablet 0     sertraline (ZOLOFT) 25 MG tablet Take 1 tablet (25 mg) by mouth daily 90 tablet 0     Allergies   Allergen Reactions     Augmentin Hives     Avocado Hives and Swelling     Flagyl [Metronidazole] Dizziness and Rash     Recent Labs   Lab Test 03/02/21  1141 06/08/20  1719 03/21/19  1331 12/20/18  0958 05/15/18  0940 01/02/18  1140   LDL  --   --   --   --   --  88   HDL  --   --   --   --   --  44*   TRIG  --   --   --   --   --  117   ALT 13 15  --  9   < > 17   CR 0.83 0.77 0.74 0.69   < > 0.83   GFRESTIMATED >90 >90 >90 >90   < > 82   GFRESTBLACK >90 >90 >90 >90   < > >90   POTASSIUM 4.3 3.5 3.7 3.8   < > 4.3   TSH 1.58  --  0.98  --   --  1.04    < > = values in this interval not displayed.        Breast Cancer Screening:    FHS-7:   Breast CA Risk Assessment (FHS-7) 10/19/2021 2/21/2023   Did any of your first-degree relatives have breast or ovarian cancer? No Yes   Did any of your relatives have bilateral breast cancer? No Yes   Did any man in your family have breast cancer? No No   Did any woman in your family have breast and ovarian cancer? No Yes   Did any woman in your family have breast cancer before age 50 y? No No   Do you have 2 or more relatives with breast and/or ovarian cancer? No No   Do you have 2 or more relatives with breast and/or bowel cancer? No Yes       Patient under 40 years of age: Routine Mammogram Screening not recommended.   Pertinent mammograms  are reviewed under the imaging tab.    History of abnormal Pap smear: NO - age 30- 65 PAP every 3 years recommended  PAP / HPV 2018   PAP (Historical) NIL NIL NIL     Reviewed and updated as needed this visit by clinical staff                  Reviewed and updated as needed this visit by Provider                 Past Medical History:   Diagnosis Date     Anxiety      Chickenpox      Febrile convulsion (H)     toddler     Major depressive disorder, recurrent episode, mild (H)     off zoloft     Ulcer     age 20      Past Surgical History:   Procedure Laterality Date     AS ENLARGE BREAST WITH IMPLANT       FOREIGN BODY REMOVAL      metal from her eye as a child     LAPAROTOMY MINI, TUBAL LIGATION (POST PARTUM), COMBINED Bilateral 2018    Procedure: COMBINED LAPAROTOMY MINI, TUBAL LIGATION (POST PARTUM);  Surgeon: Helen Smallwood MD;  Location: [facility]      History    Para Term  AB Living   4 2 2 0 2 2   SAB IAB Ectopic Multiple Live Births   2 0 0 0 2      # Outcome Date GA Lbr Gonsalo/2nd Weight Sex Delivery Anes PTL Lv   4 Term 18 39w1d 09:50 / 02:00 4.167 kg (9 lb 3 oz) F Vag-Spont EPI N YOLANDA      Name: VANDANA PALAFOX-SANTANA      Apgar1: 8  Apgar5: 9   3 Term 13 38w6d 06:55 / 01:54 3.487 kg (7 lb 11 oz) F Vag-Spont EPI N YOLANDA      Birth Comments: none observed      Name: Cindy      Apgar1: 8  Apgar5: 9   2 SAB 07/10/12 5w0d    SAB      1 SAB 10/10/07 5w0d    SAB          Review of Systems   Constitutional: Negative for chills and fever.   HENT: Negative for congestion, ear pain, hearing loss and sore throat.    Eyes: Negative for pain and visual disturbance.   Respiratory: Negative for cough and shortness of breath.    Cardiovascular: Negative for chest pain, palpitations and peripheral edema.   Gastrointestinal: Positive for abdominal pain. Negative for constipation, diarrhea, heartburn, hematochezia and nausea.   Breasts:  Negative for tenderness, breast mass  "and discharge.   Genitourinary: Positive for pelvic pain. Negative for dysuria, frequency, genital sores, hematuria, urgency, vaginal bleeding and vaginal discharge.   Musculoskeletal: Positive for myalgias. Negative for arthralgias and joint swelling.   Skin: Negative for rash.   Neurological: Negative for dizziness, weakness, headaches and paresthesias.   Psychiatric/Behavioral: Negative for mood changes. The patient is nervous/anxious.           OBJECTIVE:   /60 (BP Location: Right arm, Cuff Size: Adult Regular)   Pulse 80   Temp 97.5  F (36.4  C) (Tympanic)   Resp 16   Ht 1.562 m (5' 1.5\")   Wt 67.6 kg (149 lb)   LMP 02/17/2023   SpO2 99%   BMI 27.70 kg/m    Physical Exam  GENERAL APPEARANCE: healthy, alert and no distress  EYES: Eyes grossly normal to inspection, PERRL and conjunctivae and sclerae normal  HENT: ear canals and TM's normal, nose and mouth without ulcers or lesions, oropharynx clear and oral mucous membranes moist  NECK: no adenopathy, no asymmetry, masses, or scars and thyroid normal to palpation  RESP: lungs clear to auscultation - no rales, rhonchi or wheezes  BREAST: normal without masses, tenderness or nipple discharge and no palpable axillary masses or adenopathy  CV: regular rate and rhythm, normal S1 S2, no S3 or S4, no murmur, click or rub, no peripheral edema and peripheral pulses strong  ABDOMEN: soft, nontender, no hepatosplenomegaly, no masses and bowel sounds normal   (female): normal female external genitalia, normal urethral meatus, vaginal mucosal atrophy noted, normal cervix, adnexae, and uterus without masses or abnormal discharge  MS: no musculoskeletal defects are noted and gait is age appropriate without ataxia  SKIN: Examination of the back  rash reveals: Acne: Multiple inflamed and erythematous pustules and comedomes on the face and neck.  NEURO: Normal strength and tone, sensory exam grossly normal, mentation intact and speech normal  PSYCH: mentation " appears normal and affect normal/bright        ASSESSMENT/PLAN:   Stacie was seen today for physical.    Diagnoses and all orders for this visit:    Cervical cancer screening  -     Pap Screen with HPV - recommended age 30 - 65 years    Routine general medical examination at a health care facility  -     CBC with platelets; Future  -     TSH with free T4 reflex; Future  -     Basic metabolic panel  (Ca, Cl, CO2, Creat, Gluc, K, Na, BUN); Future  -     Basic metabolic panel  (Ca, Cl, CO2, Creat, Gluc, K, Na, BUN)  -     TSH with free T4 reflex  -     CBC with platelets    Primary insomnia  -     hydrOXYzine (ATARAX) 50 MG tablet; Take 1 tablet (50 mg) by mouth 3 times daily as needed for itching    Acne vulgaris  -     clindamycin (CLEOCIN T) 1 % external lotion; Apply topically 2 times daily        Patient has been advised of split billing requirements and indicates understanding: Yes      COUNSELING:  Reviewed preventive health counseling, as reflected in patient instructions       Regular exercise       Healthy diet/nutrition       Vision screening       Hearing screening       Contraception       Family planning       Folic Acid        She reports that she has quit smoking. She has never used smokeless tobacco.          Hilda Payton, STEPHANIE CNP  [facility]

## 2023-02-22 LAB
ANION GAP SERPL CALCULATED.3IONS-SCNC: 11 MMOL/L (ref 7–15)
BUN SERPL-MCNC: 9.9 MG/DL (ref 6–20)
CALCIUM SERPL-MCNC: 9.8 MG/DL (ref 8.6–10)
CHLORIDE SERPL-SCNC: 105 MMOL/L (ref 98–107)
CREAT SERPL-MCNC: 0.78 MG/DL (ref 0.51–0.95)
DEPRECATED HCO3 PLAS-SCNC: 26 MMOL/L (ref 22–29)
GFR SERPL CREATININE-BSD FRML MDRD: >90 ML/MIN/1.73M2
GLUCOSE SERPL-MCNC: 75 MG/DL (ref 70–99)
POTASSIUM SERPL-SCNC: 4.2 MMOL/L (ref 3.4–5.3)
SODIUM SERPL-SCNC: 142 MMOL/L (ref 136–145)
TSH SERPL DL<=0.005 MIU/L-ACNC: 1.47 UIU/ML (ref 0.3–4.2)

## 2023-02-24 LAB
BKR LAB AP GYN ADEQUACY: NORMAL
BKR LAB AP GYN INTERPRETATION: NORMAL
BKR LAB AP HPV REFLEX: NORMAL
BKR LAB AP PREVIOUS ABNORMAL: NORMAL
PATH REPORT.COMMENTS IMP SPEC: NORMAL
PATH REPORT.COMMENTS IMP SPEC: NORMAL
PATH REPORT.RELEVANT HX SPEC: NORMAL

## 2023-02-27 LAB
HUMAN PAPILLOMA VIRUS 16 DNA: NEGATIVE
HUMAN PAPILLOMA VIRUS 18 DNA: NEGATIVE
HUMAN PAPILLOMA VIRUS FINAL DIAGNOSIS: NORMAL
HUMAN PAPILLOMA VIRUS OTHER HR: NEGATIVE

## 2023-02-28 ENCOUNTER — HOSPITAL ENCOUNTER (EMERGENCY)
Facility: CLINIC | Age: 33
Discharge: HOME | End: 2023-02-28
Attending: EMERGENCY MEDICINE | Admitting: EMERGENCY MEDICINE
Payer: COMMERCIAL

## 2023-02-28 ENCOUNTER — APPOINTMENT (OUTPATIENT)
Dept: CT IMAGING | Facility: CLINIC | Age: 33
End: 2023-02-28
Attending: EMERGENCY MEDICINE
Payer: COMMERCIAL

## 2023-02-28 VITALS
RESPIRATION RATE: 20 BRPM | BODY MASS INDEX: 27.51 KG/M2 | WEIGHT: 149.5 LBS | DIASTOLIC BLOOD PRESSURE: 75 MMHG | TEMPERATURE: 97.8 F | HEIGHT: 62 IN | SYSTOLIC BLOOD PRESSURE: 124 MMHG | OXYGEN SATURATION: 92 % | HEART RATE: 111 BPM

## 2023-02-28 DIAGNOSIS — R11.2 NAUSEA AND VOMITING, UNSPECIFIED VOMITING TYPE: ICD-10-CM

## 2023-02-28 DIAGNOSIS — N83.202 CYST OF LEFT OVARY: ICD-10-CM

## 2023-02-28 DIAGNOSIS — R10.9 ABDOMINAL PAIN, UNSPECIFIED ABDOMINAL LOCATION: ICD-10-CM

## 2023-02-28 LAB
ALBUMIN SERPL BCG-MCNC: 4.3 G/DL (ref 3.5–5.2)
ALP SERPL-CCNC: 69 U/L (ref 35–104)
ALT SERPL W P-5'-P-CCNC: 19 U/L (ref 10–35)
ANION GAP SERPL CALCULATED.3IONS-SCNC: 11 MMOL/L (ref 7–15)
AST SERPL W P-5'-P-CCNC: 23 U/L (ref 10–35)
BASOPHILS # BLD AUTO: 0 10E3/UL (ref 0–0.2)
BASOPHILS NFR BLD AUTO: 0 %
BILIRUB SERPL-MCNC: 0.4 MG/DL
BUN SERPL-MCNC: 15 MG/DL (ref 6–20)
CALCIUM SERPL-MCNC: 9.4 MG/DL (ref 8.6–10)
CHLORIDE SERPL-SCNC: 103 MMOL/L (ref 98–107)
CREAT SERPL-MCNC: 0.86 MG/DL (ref 0.51–0.95)
DEPRECATED HCO3 PLAS-SCNC: 26 MMOL/L (ref 22–29)
EOSINOPHIL # BLD AUTO: 0.1 10E3/UL (ref 0–0.7)
EOSINOPHIL NFR BLD AUTO: 1 %
ERYTHROCYTE [DISTWIDTH] IN BLOOD BY AUTOMATED COUNT: 11.9 % (ref 10–15)
GFR SERPL CREATININE-BSD FRML MDRD: >90 ML/MIN/1.73M2
GLUCOSE SERPL-MCNC: 103 MG/DL (ref 70–99)
HCT VFR BLD AUTO: 45.6 % (ref 35–47)
HGB BLD-MCNC: 15.3 G/DL (ref 11.7–15.7)
IMM GRANULOCYTES # BLD: 0 10E3/UL
IMM GRANULOCYTES NFR BLD: 0 %
LIPASE SERPL-CCNC: 35 U/L (ref 13–60)
LYMPHOCYTES # BLD AUTO: 0.4 10E3/UL (ref 0.8–5.3)
LYMPHOCYTES NFR BLD AUTO: 6 %
MCH RBC QN AUTO: 30.4 PG (ref 26.5–33)
MCHC RBC AUTO-ENTMCNC: 33.6 G/DL (ref 31.5–36.5)
MCV RBC AUTO: 91 FL (ref 78–100)
MONOCYTES # BLD AUTO: 0.3 10E3/UL (ref 0–1.3)
MONOCYTES NFR BLD AUTO: 5 %
NEUTROPHILS # BLD AUTO: 5.9 10E3/UL (ref 1.6–8.3)
NEUTROPHILS NFR BLD AUTO: 88 %
NRBC # BLD AUTO: 0 10E3/UL
NRBC BLD AUTO-RTO: 0 /100
PLATELET # BLD AUTO: 165 10E3/UL (ref 150–450)
POTASSIUM SERPL-SCNC: 4 MMOL/L (ref 3.4–5.3)
PROT SERPL-MCNC: 7.2 G/DL (ref 6.4–8.3)
RBC # BLD AUTO: 5.03 10E6/UL (ref 3.8–5.2)
SODIUM SERPL-SCNC: 140 MMOL/L (ref 136–145)
WBC # BLD AUTO: 6.7 10E3/UL (ref 4–11)

## 2023-02-28 PROCEDURE — 250N000011 HC RX IP 250 OP 636: Performed by: EMERGENCY MEDICINE

## 2023-02-28 PROCEDURE — 96376 TX/PRO/DX INJ SAME DRUG ADON: CPT | Mod: 59 | Performed by: EMERGENCY MEDICINE

## 2023-02-28 PROCEDURE — 99284 EMERGENCY DEPT VISIT MOD MDM: CPT | Performed by: EMERGENCY MEDICINE

## 2023-02-28 PROCEDURE — 74177 CT ABD & PELVIS W/CONTRAST: CPT

## 2023-02-28 PROCEDURE — 83690 ASSAY OF LIPASE: CPT | Performed by: EMERGENCY MEDICINE

## 2023-02-28 PROCEDURE — 85004 AUTOMATED DIFF WBC COUNT: CPT | Performed by: EMERGENCY MEDICINE

## 2023-02-28 PROCEDURE — 96361 HYDRATE IV INFUSION ADD-ON: CPT | Performed by: EMERGENCY MEDICINE

## 2023-02-28 PROCEDURE — 96374 THER/PROPH/DIAG INJ IV PUSH: CPT | Mod: 59 | Performed by: EMERGENCY MEDICINE

## 2023-02-28 PROCEDURE — 84450 TRANSFERASE (AST) (SGOT): CPT | Performed by: EMERGENCY MEDICINE

## 2023-02-28 PROCEDURE — 96375 TX/PRO/DX INJ NEW DRUG ADDON: CPT | Mod: 59 | Performed by: EMERGENCY MEDICINE

## 2023-02-28 PROCEDURE — 250N000009 HC RX 250: Performed by: EMERGENCY MEDICINE

## 2023-02-28 PROCEDURE — 258N000003 HC RX IP 258 OP 636: Performed by: EMERGENCY MEDICINE

## 2023-02-28 PROCEDURE — 82310 ASSAY OF CALCIUM: CPT | Performed by: EMERGENCY MEDICINE

## 2023-02-28 PROCEDURE — 99285 EMERGENCY DEPT VISIT HI MDM: CPT | Mod: 25 | Performed by: EMERGENCY MEDICINE

## 2023-02-28 PROCEDURE — 36415 COLL VENOUS BLD VENIPUNCTURE: CPT | Performed by: EMERGENCY MEDICINE

## 2023-02-28 RX ORDER — HYDROMORPHONE HYDROCHLORIDE 1 MG/ML
0.5 INJECTION, SOLUTION INTRAMUSCULAR; INTRAVENOUS; SUBCUTANEOUS ONCE
Status: COMPLETED | OUTPATIENT
Start: 2023-02-28 | End: 2023-02-28

## 2023-02-28 RX ORDER — ONDANSETRON 4 MG/1
4 TABLET, ORALLY DISINTEGRATING ORAL EVERY 6 HOURS PRN
Qty: 10 TABLET | Refills: 0 | Status: SHIPPED | OUTPATIENT
Start: 2023-02-28 | End: 2023-03-07

## 2023-02-28 RX ORDER — SODIUM CHLORIDE 9 MG/ML
INJECTION, SOLUTION INTRAVENOUS CONTINUOUS
Status: DISCONTINUED | OUTPATIENT
Start: 2023-02-28 | End: 2023-02-28 | Stop reason: HOSPADM

## 2023-02-28 RX ORDER — ONDANSETRON 2 MG/ML
4 INJECTION INTRAMUSCULAR; INTRAVENOUS ONCE
Status: COMPLETED | OUTPATIENT
Start: 2023-02-28 | End: 2023-02-28

## 2023-02-28 RX ORDER — IOPAMIDOL 755 MG/ML
500 INJECTION, SOLUTION INTRAVASCULAR ONCE
Status: COMPLETED | OUTPATIENT
Start: 2023-02-28 | End: 2023-02-28

## 2023-02-28 RX ADMIN — SODIUM CHLORIDE 60 ML: 9 INJECTION, SOLUTION INTRAVENOUS at 12:33

## 2023-02-28 RX ADMIN — HYDROMORPHONE HYDROCHLORIDE 0.5 MG: 1 INJECTION, SOLUTION INTRAMUSCULAR; INTRAVENOUS; SUBCUTANEOUS at 12:50

## 2023-02-28 RX ADMIN — SODIUM CHLORIDE: 9 INJECTION, SOLUTION INTRAVENOUS at 12:51

## 2023-02-28 RX ADMIN — SODIUM CHLORIDE 1000 ML: 9 INJECTION, SOLUTION INTRAVENOUS at 12:06

## 2023-02-28 RX ADMIN — ONDANSETRON 4 MG: 2 INJECTION INTRAMUSCULAR; INTRAVENOUS at 12:05

## 2023-02-28 RX ADMIN — HYDROMORPHONE HYDROCHLORIDE 0.5 MG: 1 INJECTION, SOLUTION INTRAMUSCULAR; INTRAVENOUS; SUBCUTANEOUS at 12:05

## 2023-02-28 RX ADMIN — IOPAMIDOL 74 ML: 755 INJECTION, SOLUTION INTRAVENOUS at 12:33

## 2023-02-28 ASSESSMENT — ACTIVITIES OF DAILY LIVING (ADL): ADLS_ACUITY_SCORE: 35

## 2023-02-28 NOTE — DISCHARGE INSTRUCTIONS
It is likely her symptoms are due to a virus.  I have given you nausea medication.  You can take naproxen for pain.  Return if despite this you cannot keep anything down or your pain becomes too severe.  This should go away on its own.  Follow-up with your doctor if still having issues next week.

## 2023-02-28 NOTE — ED TRIAGE NOTES
Pt reports abd pain with nausea, vomiting and back pain that started about 0100 this morning.      Triage Assessment     Row Name 02/28/23 1128       Triage Assessment (Adult)    Airway WDL WDL       Respiratory WDL    Respiratory WDL WDL       Cardiac WDL    Cardiac WDL WDL       Peripheral/Neurovascular WDL    Peripheral Neurovascular WDL WDL

## 2023-02-28 NOTE — ED PROVIDER NOTES
History     chief complaint  HPI  Patient is a 33-year-old female with a history of tubal ligation presenting with vomiting and abdominal pain.  Patient states that she started vomiting at around 1 AM last night.  This is persisted since then.  Nonbloody nonbilious.  She subsequently developed abdominal pain earlier this morning.  The abdominal pain is diffuse but seems to be more in her left lower quadrant.  She also has back pain on the left side.  This started later as well.  She now feels like she is vomiting due to her abdominal pain.  No diarrhea.  She is not passing gas.  She ate a quesadilla last night along with a beer and no one else who ate the meal was sick.    Review of Systems:  All organ systems below were reviewed and are negative unless indicated in the HPI.    Constitutional  Eyes  ENT  Respiratory  Cardiovascular  Gastrointestinal  Genitourinary  Musculoskeletal  Skin  Neuro    Allergies:  Allergies   Allergen Reactions     Augmentin Hives     Avocado Hives and Swelling     Flagyl [Metronidazole] Dizziness and Rash       Problem List:    Patient Active Problem List    Diagnosis Date Noted     S/P tubal ligation 12/22/2018     Priority: Medium     Fetal arrhythmia before the onset of labor 12/20/2018     Priority: Medium     Prenatal care, subsequent pregnancy 05/23/2018     Priority: Medium     EFRAIN Santiago       PTSD (post-traumatic stress disorder) 10/07/2013     Priority: Medium     Hyperemesis arising during pregnancy 05/01/2013     Priority: Medium     Has PICC line  Daily zofran and fluids       Sciatica 04/12/2013     Priority: Medium     Generalized anxiety disorder 03/28/2013     Priority: Medium     CARDIOVASCULAR SCREENING; LDL GOAL LESS THAN 160 02/08/2012     Priority: Medium     ADHD (attention deficit hyperactivity disorder) 01/25/2005     Priority: Medium     Off Concerta          Past Medical History:    Past Medical History:   Diagnosis Date     Anxiety      Chickenpox   "    Febrile convulsion (H)      Major depressive disorder, recurrent episode, mild (H)      Ulcer        Past Surgical History:    Past Surgical History:   Procedure Laterality Date     AS ENLARGE BREAST WITH IMPLANT       FOREIGN BODY REMOVAL      metal from her eye as a child     LAPAROTOMY MINI, TUBAL LIGATION (POST PARTUM), COMBINED Bilateral 12/22/2018    Procedure: COMBINED LAPAROTOMY MINI, TUBAL LIGATION (POST PARTUM);  Surgeon: Helen Smallwood MD;  Location: [facility] OR       Family History:    Family History   Problem Relation Age of Onset     C.A.D. Father      Cancer Father         lymphoma     Prostate Cancer Father      Cancer Maternal Grandmother         rectal     Depression Maternal Grandmother      Cerebrovascular Disease Maternal Grandmother      Prostate Cancer Maternal Grandfather      Breast Cancer Paternal Grandmother      Hypertension Paternal Grandmother      Depression Paternal Grandmother      Thyroid Disease Paternal Grandmother      Hypertension Paternal Grandfather      Macular Degeneration Paternal Grandfather      Depression Mother      Bipolar Disorder Mother      Depression Sister      Bipolar Disorder Sister      Depression Sister      Schizophrenia Maternal Uncle      Suicide Paternal Aunt      Macular Degeneration Other      Diabetes No family hx of      Glaucoma No family hx of        Medications:    acetaminophen (TYLENOL) 500 MG tablet  acetaminophen-caff-pyrilamine (MIDOL MENSTRUAL) 500-60-15 MG TABS per tablet  hydrOXYzine (ATARAX) 50 MG tablet  ondansetron (ZOFRAN ODT) 4 MG ODT tab  clindamycin (CLEOCIN T) 1 % external lotion          Physical Exam   BP: 108/83  Pulse: 111  Temp: 97.8  F (36.6  C)  Resp: 20  Height: 157.5 cm (5' 2\")  Weight: 67.8 kg (149 lb 8 oz)  SpO2: 99 %    Gen: Vital signs reviewed  Eyes: Sclera white, pupils round  ENT: External ears and nares normal  Card: Regular rate and rhythm  Resp: No respiratory distress. Lungs clear to auscultation " bilaterally  Abd: Soft, non-distended, mildly tender to palpation diffusely in more so in the left lower quadrant.  Extremities: Warm, no edema  Skin: No rashes  Neuro: Alert, speech normal.     ED Course             Results for orders placed or performed during the hospital encounter of 02/28/23 (from the past 24 hour(s))   CBC with platelets differential    Narrative    The following orders were created for panel order CBC with platelets differential.  Procedure                               Abnormality         Status                     ---------                               -----------         ------                     CBC with platelets and d...[[id number]]  Abnormal            Final result                 Please view results for these tests on the individual orders.   Comprehensive metabolic panel   Result Value Ref Range    Sodium 140 136 - 145 mmol/L    Potassium 4.0 3.4 - 5.3 mmol/L    Chloride 103 98 - 107 mmol/L    Carbon Dioxide (CO2) 26 22 - 29 mmol/L    Anion Gap 11 7 - 15 mmol/L    Urea Nitrogen 15.0 6.0 - 20.0 mg/dL    Creatinine 0.86 0.51 - 0.95 mg/dL    Calcium 9.4 8.6 - 10.0 mg/dL    Glucose 103 (H) 70 - 99 mg/dL    Alkaline Phosphatase 69 35 - 104 U/L    AST 23 10 - 35 U/L    ALT 19 10 - 35 U/L    Protein Total 7.2 6.4 - 8.3 g/dL    Albumin 4.3 3.5 - 5.2 g/dL    Bilirubin Total 0.4 <=1.2 mg/dL    GFR Estimate >90 >60 mL/min/1.73m2   Lipase   Result Value Ref Range    Lipase 35 13 - 60 U/L   CBC with platelets and differential   Result Value Ref Range    WBC Count 6.7 4.0 - 11.0 10e3/uL    RBC Count 5.03 3.80 - 5.20 10e6/uL    Hemoglobin 15.3 11.7 - 15.7 g/dL    Hematocrit 45.6 35.0 - 47.0 %    MCV 91 78 - 100 fL    MCH 30.4 26.5 - 33.0 pg    MCHC 33.6 31.5 - 36.5 g/dL    RDW 11.9 10.0 - 15.0 %    Platelet Count 165 150 - 450 10e3/uL    % Neutrophils 88 %    % Lymphocytes 6 %    % Monocytes 5 %    % Eosinophils 1 %    % Basophils 0 %    % Immature Granulocytes 0 %    NRBCs per 100 WBC 0 <1 /100     Absolute Neutrophils 5.9 1.6 - 8.3 10e3/uL    Absolute Lymphocytes 0.4 (L) 0.8 - 5.3 10e3/uL    Absolute Monocytes 0.3 0.0 - 1.3 10e3/uL    Absolute Eosinophils 0.1 0.0 - 0.7 10e3/uL    Absolute Basophils 0.0 0.0 - 0.2 10e3/uL    Absolute Immature Granulocytes 0.0 <=0.4 10e3/uL    Absolute NRBCs 0.0 10e3/uL   CT Abdomen Pelvis w Contrast    Narrative    CT ABDOMEN/PELVIS WITH CONTRAST February 28, 2023 12:43 PM    CLINICAL HISTORY: Left lower quadrant pain, vomiting, diarrhea.    TECHNIQUE: CT scan of the abdomen and pelvis was performed following  injection of IV contrast. Multiplanar reformats were obtained. Dose  reduction techniques were used.  CONTRAST: 74 ml Isovue- 370.    COMPARISON: CT abdomen/pelvis dated 6/8/2020.    FINDINGS:   LOWER CHEST: Bilateral breast prostheses are partially imaged. There  is a small hiatal hernia containing a portion of the gastric cardia.  Visualized portions of the lower chest are otherwise unremarkable.    HEPATOBILIARY: Normal.    PANCREAS: Normal.    SPLEEN: Normal.    ADRENAL GLANDS: Normal.    KIDNEYS/BLADDER: Normal.    BOWEL: There is a moderate amount stool in the colon. No pericolonic  inflammatory change to suggest acute diverticulitis. No definite  diverticula are seen. Appendix is normal in appearance and extends  medially from the cecum. Small bowel is grossly of normal caliber and  appearance. The stomach contains a small amount fluid and air, but is  otherwise unremarkable. There is a small hiatal hernia containing a  portion of the gastric cardia.    PELVIC ORGANS: Uterus is normal in appearance. Probable dominant  follicle is seen in the left ovary measuring up to 1.7 cm.    ADDITIONAL FINDINGS: No abnormal free fluid or free air collections  are seen in the peritoneal cavity or in the retroperitoneum. No  adenopathy is identified. Aorta is normal in appearance.    MUSCULOSKELETAL: No aggressive osseous lesions or acute osseous  fractures are identified.  Subtle sclerosis is seen in the left ilium  adjacent to sacroiliac joint, unchanged since the prior studies and  likely represents a benign bone island. Mild small nodulation is seen  in the lower thoracic spine.      Impression    IMPRESSION:   1.  Probable dominant follicle in the left ovary. This could be  associated with the patient's symptoms.  2.  Colonic diverticulosis.  3.  No other definite etiology for patient's left lower quadrant  abdominal pain is identified. No evidence for diverticulitis, colitis,  epiploic appendagitis, appendicitis, urinary system calculus or  obstruction, bowel obstruction, or vascular compromise is identified.  4.  Small hiatal hernia contains a portion of the gastric cardia.       Medications   0.9% sodium chloride BOLUS (0 mLs Intravenous Stopped 2/28/23 1251)   ondansetron (ZOFRAN) injection 4 mg (4 mg Intravenous $Given 2/28/23 1205)   HYDROmorphone (PF) (DILAUDID) injection 0.5 mg (0.5 mg Intravenous $Given 2/28/23 1205)   100 mL saline bag CT (60 mLs Intravenous $Given 2/28/23 1233)   iopamidol (ISOVUE-370) solution 500 mL (74 mLs Intravenous $Given 2/28/23 1233)   HYDROmorphone (PF) (DILAUDID) injection 0.5 mg (0.5 mg Intravenous $Given 2/28/23 1250)         Consultations:  None    Social Determinants of Health:  Sexually active with female    Assessments & Plan (with Medical Decision Making)       I have reviewed the nursing notes.    I have reviewed the findings, diagnosis, plan and need for follow up with the patient.      Medical Decision Making    On arrival, patient is tachycardic.  The differential for presentation includes viral syndrome, bowel obstruction, diverticulitis, ruptured viscus, appendicitis.  Laboratory work-up was ordered and she was given the above medications for symptomatic relief.  Patient is sexually active with a female and has a tubal ligation, thus pregnancy test not sent.  CT scan of her abdomen pelvis ordered and does not reveal acute  pathology.  There is a dominant follicle on her left ovary.  Despite her tenderness being more in the left lower quadrant, her description of her pain is diffuse.  I do not suspect that this is the cause of her current vomiting and abdominal pain.  I do not suspect ovarian torsion.  Symptoms resolved with the above medications.  Presentation most consistent with viral syndrome.  Discharged home in stable condition with appropriate return precautions and outpatient follow-up.      Final diagnoses:   Abdominal pain, unspecified abdominal location   Nausea and vomiting, unspecified vomiting type   Cyst of left ovary       Ehsan Moura M.D.   [facility] Emergency Department    Ehsan Moura MD  02/28/23 0049    Ehsan Moura MD  03/02/23 [id number]

## 2023-02-28 NOTE — ED NOTES
Pt able to ambulate out of ER at this time independently to car with ride. Not in distress at time of exit.

## 2023-03-01 ENCOUNTER — TELEPHONE (OUTPATIENT)
Dept: FAMILY MEDICINE | Facility: CLINIC | Age: 33
End: 2023-03-01
Payer: COMMERCIAL

## 2023-03-01 NOTE — TELEPHONE ENCOUNTER
Patient calling. Was in to See Hilda Payton on 2/21 and talked about an ultrasound to see if patient has ovarian cyst.     Patient was in ER on 2/28 and had a CT which they believed showed ovarian cyst. Patient is requesting orders for a ultrasound to see if patient does have cysts.         Could we send this information to you in [employer] or would you prefer to receive a phone call?:   Patient would prefer a phone call   Okay to leave a detailed message?: Yes at Home number on file [phone] (home)

## 2023-03-02 NOTE — TELEPHONE ENCOUNTER
Recommend patient be seen for further evaluation and determine if further testing in needed.    /Hilda Payton CNP

## 2023-03-07 ENCOUNTER — OFFICE VISIT (OUTPATIENT)
Dept: FAMILY MEDICINE | Facility: CLINIC | Age: 33
End: 2023-03-07
Payer: COMMERCIAL

## 2023-03-07 VITALS
WEIGHT: 155 LBS | SYSTOLIC BLOOD PRESSURE: 106 MMHG | TEMPERATURE: 97.6 F | HEIGHT: 62 IN | OXYGEN SATURATION: 99 % | RESPIRATION RATE: 18 BRPM | HEART RATE: 90 BPM | DIASTOLIC BLOOD PRESSURE: 68 MMHG | BODY MASS INDEX: 28.52 KG/M2

## 2023-03-07 DIAGNOSIS — R60.0 LOWER EXTREMITY EDEMA: ICD-10-CM

## 2023-03-07 DIAGNOSIS — G57.22 ANTERIOR FEMORAL CUTANEOUS NEUROPATHY OF LEFT LOWER EXTREMITY: ICD-10-CM

## 2023-03-07 DIAGNOSIS — K59.01 SLOW TRANSIT CONSTIPATION: ICD-10-CM

## 2023-03-07 DIAGNOSIS — N83.202 CYST OF LEFT OVARY: Primary | ICD-10-CM

## 2023-03-07 PROCEDURE — 99214 OFFICE O/P EST MOD 30 MIN: CPT | Performed by: STUDENT IN AN ORGANIZED HEALTH CARE EDUCATION/TRAINING PROGRAM

## 2023-03-07 ASSESSMENT — PAIN SCALES - GENERAL: PAINLEVEL: MILD PAIN (2)

## 2023-03-07 NOTE — PROGRESS NOTES
Assessment & Plan     Patient is a pleasant 33-year-old female who presents today for abdominal pain.  The past couple months, she has been having primarily left lower quadrant abdominal discomfort.  She was seen in our clinic for this previously.  In the interim, had severe increase in pain, was seen in the emergency department.  I reviewed this documentation.  Work-up included a CT abdomen pelvis that did show a cyst on the left ovary as well as moderate stool burden.  Patient has had some improvement in pain, but overall it has worsened over time, and she is wondering what is causing this.  In addition to the abdominal pain, she does have some almost muscle  aching on the left anterior thigh, as well as some bilateral lower extremity swelling, worse the longer she stands upright.    Cyst of left ovary  Today, we discussed possible etiologies of her pain including the ovary, risk for ovarian torsion, constipation.  For work-up of the known ovarian cyst, we will obtain a pelvic ultrasound to evaluate this area.  She has not had significant increase in pain during menses, no significant increase in bleeding, so endometriosis is less likely, though remains on the differential at this point.  - US Pelvic Complete with Transvaginal    Slow transit constipation  She did have moderate stool burden on imaging in the emergency department.  We discussed that she should take a aggressive bowel regimen including OTC miralax and senna over the next few weeks to try to clear out her colon, while we are getting more work-up.    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  We discussed wearing looser fitting clothing and underwear to avoid compression of the nerve on that side and see if symptoms improve.    Lower extremity edema  Patient is noting lower extremity edema, particularly after standing upright for an extended  "period of time.  The significance of the correlation with onset around the time of the abdominal pain is unclear at this point.  Certainly it seems that this is dependent edema, worse when she has been standing for an extended period of time.  Encouraged her to keep her feet elevated when possible.  Consider pelvic congestion as possible cause, though no obvious findings of this were seen on CT.  Pelvic ultrasound may be helpful in evaluating for this.  She did have CMP completed during her emergency department visit that did not show any underlying hepatic or renal disease.       MED REC REQUIRED  Post Medication Reconciliation Status:  Discharge medications reconciled, continue medications without change    BMI:   Estimated body mass index is 28.35 kg/m  as calculated from the following:    Height as of this encounter: 1.575 m (5' 2\").    Weight as of this encounter: 70.3 kg (155 lb).   Did not discuss today    Return TBD pending workup.    Karolyn Mooney MD  [facility]    Casie Quinones is a 33 year old, presenting for the following health issues:  Chief Complaint   Patient presents with     ER F/U       HPI     ED/UC Followup:    Facility:  [facility]   Date of visit: 02/28/2023  Reason for visit: Abdominal pain  Current Status: improved, but still having bad days    Sharp random pain on the left side, felt like period cramping only in one spot.   Legs and feet are swelling randomly throughout the day  Pain only on  Left side, front.   Couple days later had severe pain and vomiting. Left ovary cyst.   A lot of symptoms with it. Had a hot pack all day, costant. Today a little better.   midol daily for bloating never really had.     This all first started a couple months ago with th eletft side pain, off and on at that time.   Mom had ovarian cysts with pregnancies.     More of an achine in the legs.     Swelling depends day to day.   No fevers.   Periods - a " "week early this last time, but close to regular.     Bowel movemenst - more constiapted than usual. Stool softeners twice in the past few months. Increased water intake and it regulates better.     Not a lot of changes in cramping with menses.     Review of Systems   Constitutional, HEENT, cardiovascular, pulmonary, GI, , musculoskeletal, neuro, skin, endocrine and psych systems are negative, except as otherwise noted.      Objective    /68 (BP Location: Right arm, Patient Position: Sitting, Cuff Size: Adult Regular)   Pulse 90   Temp 97.6  F (36.4  C) (Tympanic)   Resp 18   Ht 1.575 m (5' 2\")   Wt 70.3 kg (155 lb)   LMP 02/17/2023   SpO2 99%   BMI 28.35 kg/m    Body mass index is 28.35 kg/m .  Physical Exam  Constitutional:       Appearance: Normal appearance.   HENT:      Head: Normocephalic.   Eyes:      General: No scleral icterus.     Extraocular Movements: Extraocular movements intact.      Conjunctiva/sclera: Conjunctivae normal.   Cardiovascular:      Rate and Rhythm: Normal rate and regular rhythm.   Pulmonary:      Effort: Pulmonary effort is normal.      Breath sounds: Normal breath sounds.   Abdominal:      General: Abdomen is flat. Bowel sounds are normal.      Palpations: Abdomen is soft.      Tenderness: There is no abdominal tenderness.   Musculoskeletal:         General: Normal range of motion.      Cervical back: Normal range of motion.      Right lower leg: No edema.      Left lower leg: No edema.   Skin:     General: Skin is warm and dry.   Neurological:      General: No focal deficit present.      Mental Status: She is alert and oriented to person, place, and time.                      "

## 2023-03-07 NOTE — PATIENT INSTRUCTIONS
Patient Education   Here is the plan from today's visit    1. Cyst of left ovary  Anterior femoral cutaneous neuropathy of the left lower extremity  We will get the ultrasound to evaluate the ovary. Remember in the meantime to try to wear looser fitting clothing to avoid putting pressure on the anterior cutaneous nerve of the left leg.   - US Pelvic Complete with Transvaginal; Future    2. Slow transit constipation  Miralax (polyethylene glycol) vs metamucil or similar - take 1 dose 1-2 times daily  And/or  Senna (senokot) - take 1 tablet 1-2 times daily    Take bowel meds until you are having very soft stools at least daily, if not twice daily, then decrease dose every few days. Ensure you continue to have daily soft bowel movements.       Please call or return to clinic if your symptoms don't go away.    Follow up plan  No follow-ups on file.    Thank you for coming to the [facility] today.  Lab Testing:  **If you had lab testing today and your results are reassuring or normal they will be mailed to you or sent through pickrset within 7 days.   **If the lab tests need quick action we will call you with the results.  **If you are having labs done on a different day, please call [phone] to schedule at the [facility] or [phone] for other [facility] locations. Labs do not offer walk-in appointments.  The phone number we will call with results is # [phone] (home) . If this is not the best number please call our clinic and change the number.  Medication Refills:  If you need any refills please call your pharmacy and they will contact us.   If you need to  your refill at a new pharmacy, please contact the new pharmacy directly. The new pharmacy will help you get your medications transferred faster.   Scheduling:  If you have any concerns about today's visit or wish to schedule another appointment please call our office during normal business hours [phone]   If  a referral was made to an [facility] specialty provider and you do not get a call from central scheduling, please refer to directions on your visit summary or call our office during normal business hours for assistance.   If a Mammogram was ordered for you at the [facility] call [phone] to schedule or change your appointment.  If you had an XRay/CT/Ultrasound/MRI ordered the number is [phone] to schedule or change your radiology appointment.   If you had an Echo/Heart Monitor/Cardiac Cath or other cardiac testing ordered the number is [phone] to schedule or change your appointment.   Medical Concerns:  If you have urgent medical concerns please call [phone] at any time of the day.    Karolyn Mooney MD

## 2023-03-27 ENCOUNTER — HOSPITAL ENCOUNTER (OUTPATIENT)
Dept: ULTRASOUND IMAGING | Facility: CLINIC | Age: 33
Discharge: HOME | End: 2023-03-27
Attending: STUDENT IN AN ORGANIZED HEALTH CARE EDUCATION/TRAINING PROGRAM | Admitting: STUDENT IN AN ORGANIZED HEALTH CARE EDUCATION/TRAINING PROGRAM
Payer: COMMERCIAL

## 2023-03-27 DIAGNOSIS — N83.202 CYST OF LEFT OVARY: ICD-10-CM

## 2023-03-27 PROCEDURE — 76856 US EXAM PELVIC COMPLETE: CPT

## 2023-07-02 NOTE — TELEPHONE ENCOUNTER
"Noted patient was seen by STEPHANIE Rogel CNP on 04-27-18 for pregnancy confirmation. Noted LMP unknown. US on 05-04-18. KAI 12-28-18. 6w 3d.    Phone call to patient. She reports N & V that is only from [id number]. Reports she wakes up feeling hot and then N & V. Patient stated she stomach settles down about 0800 and slowly starts to drink and then salines and then slowly advances her diet. Stated \"I have this routine going.\" Patient stated it is not like last pregnancy with hyperemesis. She found she can tolerate PNV if she takes at 1030. Patient has tried vit B-6 and requests Zofran ODT. Verified pharmacy.  Explained will send message to STEPHANIE Rogel CNP. Explained staff will call patient back with STEPHANIE Rogel CNP's orders.  Noted patient has FOB scheduled with [facility] on 05-29-18.   Joi Mckeon RN, BAN      " 03-Jul-2023

## 2023-09-28 ENCOUNTER — TELEPHONE (OUTPATIENT)
Dept: FAMILY MEDICINE | Facility: CLINIC | Age: 33
End: 2023-09-28
Payer: COMMERCIAL

## 2023-09-28 NOTE — TELEPHONE ENCOUNTER
Patient Quality Outreach    Patient is due for the following:   Depression  -  PHQ-9 needed    Next Steps:   Patient needs to complete a PHQ9.    Type of outreach:    Sent [employer] message.      Questions for provider review:    None           Dia Mccrary, CMA

## 2023-12-11 ENCOUNTER — OFFICE VISIT (OUTPATIENT)
Dept: OPTOMETRY | Facility: CLINIC | Age: 33
End: 2023-12-11
Payer: COMMERCIAL

## 2023-12-11 DIAGNOSIS — H52.13 MYOPIA OF BOTH EYES: ICD-10-CM

## 2023-12-11 DIAGNOSIS — Z01.00 ROUTINE EYE EXAM: Primary | ICD-10-CM

## 2023-12-11 PROCEDURE — 92004 COMPRE OPH EXAM NEW PT 1/>: CPT | Performed by: OPTOMETRIST

## 2023-12-11 PROCEDURE — 92015 DETERMINE REFRACTIVE STATE: CPT | Performed by: OPTOMETRIST

## 2023-12-11 ASSESSMENT — KERATOMETRY
OD_K1POWER_DIOPTERS: 45.00
OS_K2POWER_DIOPTERS: 45.50
OS_K1POWER_DIOPTERS: 45.00
OD_K2POWER_DIOPTERS: 45.50
OS_AXISANGLE2_DEGREES: 3
OD_AXISANGLE2_DEGREES: 153

## 2023-12-11 ASSESSMENT — REFRACTION_MANIFEST
OS_SPHERE: -1.50
OS_CYLINDER: SPHERE
OS_AXIS: 115
OD_CYLINDER: +0.25
OD_AXIS: 047
OS_CYLINDER: +0.25
OD_SPHERE: -1.50
OD_CYLINDER: SPHERE
OD_SPHERE: -1.50
OS_SPHERE: -1.75

## 2023-12-11 ASSESSMENT — CONF VISUAL FIELD
OS_INFERIOR_NASAL_RESTRICTION: 0
OD_NORMAL: 1
OS_SUPERIOR_NASAL_RESTRICTION: 0
OD_SUPERIOR_NASAL_RESTRICTION: 0
OS_SUPERIOR_TEMPORAL_RESTRICTION: 0
OD_INFERIOR_NASAL_RESTRICTION: 0
OS_NORMAL: 1
OD_INFERIOR_TEMPORAL_RESTRICTION: 0
METHOD: COUNTING FINGERS
OS_INFERIOR_TEMPORAL_RESTRICTION: 0
OD_SUPERIOR_TEMPORAL_RESTRICTION: 0

## 2023-12-11 ASSESSMENT — VISUAL ACUITY
OS_PH_SC+: +1
OS_SC+: -2
OD_SC: 20/20
OD_PH_SC: 20/30
METHOD: SNELLEN - LINEAR
OS_SC: 20/20
OS_SC: 20/60
OS_PH_SC: 20/25
OD_SC+: -3
OD_SC: 20/40
OD_PH_SC+: +1

## 2023-12-11 ASSESSMENT — EXTERNAL EXAM - LEFT EYE: OS_EXAM: NORMAL

## 2023-12-11 ASSESSMENT — SLIT LAMP EXAM - LIDS
COMMENTS: NORMAL
COMMENTS: NORMAL

## 2023-12-11 ASSESSMENT — TONOMETRY
IOP_METHOD: APPLANATION
OS_IOP_MMHG: 12
OD_IOP_MMHG: 12

## 2023-12-11 ASSESSMENT — EXTERNAL EXAM - RIGHT EYE: OD_EXAM: NORMAL

## 2023-12-11 ASSESSMENT — CUP TO DISC RATIO
OD_RATIO: 0.2
OS_RATIO: 0.2

## 2023-12-11 NOTE — PROGRESS NOTES
Chief Complaint   Patient presents with    COMPREHENSIVE EYE EXAM      Accompanied by young daughter   Last Eye Exam: 11/2019  Dilated Previously: Yes    What are you currently using to see?  Has glasses that she she feels do not work well. She did not bring them to this appointment        Distance Vision Acuity: Noticed gradual change in both eyes, some blurriness and also squints     Near Vision Acuity: Satisfied with vision while reading and using computer unaided    Eye Comfort: good  Do you use eye drops? 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       OBJECTIVE: See Ophthalmology exam    ASSESSMENT:    ICD-10-CM    1. Routine eye exam  Z01.00       2. Myopia of both eyes  H52.13           PLAN:     Patient Instructions   Fill glasses prescription  Allow 2 weeks to adapt to change in glasses  Return in 1 year for eye exam    Yumi Tran O.D.  [facility]  [address] [place], [place] [id number] [phone]

## 2023-12-11 NOTE — PATIENT INSTRUCTIONS
Fill glasses prescription  Allow 2 weeks to adapt to change in glasses  Return in 1 year for eye exam    Yumi Tran O.D.  [facility]  [address] [place] [address] [phone]

## 2023-12-11 NOTE — LETTER
12/11/2023         RE: Stacie Daniels  [address] [place] [address]        Dear Colleague,    Thank you for referring your patient, Stacie Daniels, to the [facility]. Please see a copy of my visit note below.    Chief Complaint   Patient presents with     COMPREHENSIVE EYE EXAM      Accompanied by young daughter   Last Eye Exam: 11/2019  Dilated Previously: Yes    What are you currently using to see?  Has glasses that she she feels do not work well. She did not bring them to this appointment        Distance Vision Acuity: Noticed gradual change in both eyes, some blurriness and also squints     Near Vision Acuity: Satisfied with vision while reading and using computer unaided    Eye Comfort: good  Do you use eye drops? 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       OBJECTIVE: See Ophthalmology exam    ASSESSMENT:    ICD-10-CM    1. Routine eye exam  Z01.00       2. Myopia of both eyes  H52.13           PLAN:     Patient Instructions   Fill glasses prescription  Allow 2 weeks to adapt to change in glasses  Return in 1 year for eye exam    Yumi Tran O.D.  [facility]  [address] [place], [place] [id number] [phone]        Again, thank you for allowing me to participate in the care of your patient.        Sincerely,        Yumi Tran, OD

## 2024-03-01 ENCOUNTER — OFFICE VISIT (OUTPATIENT)
Dept: FAMILY MEDICINE | Facility: CLINIC | Age: 34
End: 2024-03-01
Payer: COMMERCIAL

## 2024-03-01 VITALS
OXYGEN SATURATION: 96 % | BODY MASS INDEX: 22.2 KG/M2 | DIASTOLIC BLOOD PRESSURE: 72 MMHG | HEART RATE: 75 BPM | RESPIRATION RATE: 12 BRPM | TEMPERATURE: 98.4 F | HEIGHT: 64 IN | SYSTOLIC BLOOD PRESSURE: 111 MMHG | WEIGHT: 130 LBS

## 2024-03-01 DIAGNOSIS — M54.9 UPPER BACK PAIN ON LEFT SIDE: ICD-10-CM

## 2024-03-01 DIAGNOSIS — F41.9 ANXIETY: Primary | ICD-10-CM

## 2024-03-01 PROCEDURE — 99213 OFFICE O/P EST LOW 20 MIN: CPT | Performed by: PHYSICIAN ASSISTANT

## 2024-03-01 RX ORDER — BACLOFEN 10 MG/1
10 TABLET ORAL DAILY PRN
Qty: 30 TABLET | Refills: 0 | Status: SHIPPED | OUTPATIENT
Start: 2024-03-01

## 2024-03-01 RX ORDER — BACLOFEN 10 MG/1
10 TABLET ORAL DAILY PRN
Qty: 30 TABLET | Refills: 0 | Status: SHIPPED | OUTPATIENT
Start: 2024-03-01 | End: 2024-03-01

## 2024-03-01 RX ORDER — HYDROXYZINE PAMOATE 50 MG/1
50 CAPSULE ORAL
Qty: 90 CAPSULE | Refills: 3 | Status: SHIPPED | OUTPATIENT
Start: 2024-03-01 | End: 2024-03-01

## 2024-03-01 RX ORDER — BUSPIRONE HYDROCHLORIDE 5 MG/1
10 TABLET ORAL AT BEDTIME
COMMUNITY

## 2024-03-01 RX ORDER — HYDROXYZINE PAMOATE 50 MG/1
50 CAPSULE ORAL
Qty: 90 CAPSULE | Refills: 3 | Status: SHIPPED | OUTPATIENT
Start: 2024-03-01

## 2024-03-01 RX ORDER — OMEGA-3 FATTY ACIDS/FISH OIL 300-1000MG
200 CAPSULE ORAL EVERY 4 HOURS PRN
COMMUNITY

## 2024-03-01 ASSESSMENT — ANXIETY QUESTIONNAIRES
GAD7 TOTAL SCORE: 15
1. FEELING NERVOUS, ANXIOUS, OR ON EDGE: NEARLY EVERY DAY
5. BEING SO RESTLESS THAT IT IS HARD TO SIT STILL: MORE THAN HALF THE DAYS
8. IF YOU CHECKED OFF ANY PROBLEMS, HOW DIFFICULT HAVE THESE MADE IT FOR YOU TO DO YOUR WORK, TAKE CARE OF THINGS AT HOME, OR GET ALONG WITH OTHER PEOPLE?: VERY DIFFICULT
6. BECOMING EASILY ANNOYED OR IRRITABLE: SEVERAL DAYS
GAD7 TOTAL SCORE: 15
7. FEELING AFRAID AS IF SOMETHING AWFUL MIGHT HAPPEN: NOT AT ALL
2. NOT BEING ABLE TO STOP OR CONTROL WORRYING: NEARLY EVERY DAY
3. WORRYING TOO MUCH ABOUT DIFFERENT THINGS: NEARLY EVERY DAY
GAD7 TOTAL SCORE: 15
4. TROUBLE RELAXING: NEARLY EVERY DAY
IF YOU CHECKED OFF ANY PROBLEMS ON THIS QUESTIONNAIRE, HOW DIFFICULT HAVE THESE PROBLEMS MADE IT FOR YOU TO DO YOUR WORK, TAKE CARE OF THINGS AT HOME, OR GET ALONG WITH OTHER PEOPLE: VERY DIFFICULT
7. FEELING AFRAID AS IF SOMETHING AWFUL MIGHT HAPPEN: NOT AT ALL

## 2024-03-01 ASSESSMENT — ENCOUNTER SYMPTOMS: NERVOUS/ANXIOUS: 1

## 2024-03-01 ASSESSMENT — PAIN SCALES - GENERAL: PAINLEVEL: MODERATE PAIN (4)

## 2024-03-01 NOTE — PROGRESS NOTES
Assessment & Plan     Anxiety  Discussed that it would be in her best interest to take an anxiety medication daily and have a sleep study.  Discussed that there are more sleep issues then, simply, sleep apnea.  In addition, discussed that approaching her anxiety in a daily medication would, overall, help to decrease her anxiety in all aspects of life and not just with sleep.  She has done therapy in the past.  Ultimately, patient would just like a refill of the hydroxyzine (although she did mention she would prefer to have the Ambien).  Discussed that I am not willing to give her a controlled substance unless she has had a sleep medicine consult and they feel this is the most beneficial treatment for her.  However, she can take the hydroxyzine and the buspirone as needed together.  Patient seemed agreeable to this plan of care.  Refilled the hydroxyzine.  - hydrOXYzine (VISTARIL) 50 MG capsule; Take 1 capsule (50 mg) by mouth nightly as needed for other (insomnia)    Upper back pain on left side  Likely trapezius strain, despite the fact that she does not have lower thoracic pain.  Could be multiple muscles/tendon involvement, but the distribution of the pain goes from the occiput to the upper shoulder and across the upper back in the distribution of the upper trapezius muscle.  Refill baclofen 1 time.  If pain continues, would refer to Ortho for further evaluation and treatment.  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  Cautioned patient not to take the baclofen within 8 hours of the hydroxyzine as this could potentiate side effects with both medications.  Also cautioned patient not to drink alcohol with either of these medications.  Patient verbalized understanding.  - baclofen (LIORESAL) 10 MG tablet; Take 1 tablet (10 mg) by mouth daily as  needed for muscle spasms    Subjective   Stacie is a 34 year old, presenting for the following health issues:  Anxiety (Per pt anxiety/insomnia) and Neck Pain ( per pt shoulder and neck pain on left side)        3/1/2024    10:12 AM   Additional Questions   Roomed by Sheila   Accompanied by self         3/1/2024    10:12 AM   Patient Reported Additional Medications   Patient reports taking the following new medications N/a     Stacie is a pleasant 34-year-old female who presents to clinic for follow-up of anxiety.  She reports that she has been anxious for most of her life.  Her anxiety causes problems with sleep.  She has tried SSRIs in the past but does not find that taking a pill daily is conducive to her lifestyle and will often forget to take the medication.  Therefore, she states the medications are not right for her.  Instead, she has taken multiple different kinds of medications to help with sleep in the past.  She took Xanax but did not like how it made her feel.  She did like taking Ambien because she states last time she took Ambien, she was in cosmetology school at the time, was the last time she got a good night sleep.  She reports that when her kids are with her, she cannot take anything to help her sleep because she needs to be available for them.  Otherwise, she finds herself going to bed at 8 PM because she knows that she will wake up between 230 and 3 AM regardless of what time she goes to bed and if she tries to sleep later, her anxiety significantly worsens.  She was taking hydroxyzine when she was pregnant and that seemed to be fairly effective.  She ran out of the hydroxyzine and has been taking buspirone for the past couple of days.  She is not sure if that is as helpful as the hydroxyzine.  Even so, the hydroxyzine only helps her to fall asleep but does not help her to sleep longer.    In addition, she has a chronic issue with the muscle on the left side of her neck/upper back/shoulder area.   "She reports that she has done physical therapy multiple times when it flares.  She cannot recall any specific injury that might have induced at this time but states that it could have happened when she lifted her daughter or was lifting over her head.  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  When she gets a flare of this now, she takes muscle relaxants as needed.  She took a baclofen the other day and felt that it was effective at reducing the amount of pain she was having.  She would like a refill of that medication.  She does not want to return to physical therapy as she feels she already knows how to treat this injury.  She has not seen an orthopedist regarding this injury.    History of Present Illness       Mental Health Follow-up:  Patient presents to follow-up on Anxiety.    Patient's anxiety since last visit has been:  Bad  The patient is having other symptoms associated with anxiety.  Any significant life events: financial concerns, housing concerns and other  Patient is feeling anxious or having panic attacks.  Patient has no concerns about alcohol or drug use.    Reason for visit:  Anxiety/insomnia.also shoulder and neck pain on left side    She eats 0-1 servings of fruits and vegetables daily.She consumes 1 sweetened beverage(s) daily.She exercises with enough effort to increase her heart rate 20 to 29 minutes per day.  She exercises with enough effort to increase her heart rate 4 days per week.   She is taking medications regularly.     ROS: as per HPI      Objective    /72   Pulse 75   Temp 98.4  F (36.9  C) (Tympanic)   Resp 12   Ht 1.626 m (5' 4\")   Wt 59 kg (130 lb)   LMP 02/02/2024 (Within Days)   SpO2 96%   BMI 22.31 kg/m    Body mass index is 22.31 kg/m .  Physical Exam   Vital signs reviewed  Constitutional: Well-appearing, well-groomed, no apparent distress  Respiration: Even and unlabored  Psych: Normal mood, judgment, thought " content    Signed Electronically by: ROMAN BARRERA PA-C

## 2024-04-13 ENCOUNTER — HEALTH MAINTENANCE LETTER (OUTPATIENT)
Age: 34
End: 2024-04-13

## 2024-05-21 ENCOUNTER — OFFICE VISIT (OUTPATIENT)
Dept: FAMILY MEDICINE | Facility: CLINIC | Age: 34
End: 2024-05-21
Payer: COMMERCIAL

## 2024-05-21 VITALS
HEIGHT: 64 IN | WEIGHT: 130.2 LBS | SYSTOLIC BLOOD PRESSURE: 111 MMHG | HEART RATE: 75 BPM | DIASTOLIC BLOOD PRESSURE: 78 MMHG | BODY MASS INDEX: 22.23 KG/M2 | RESPIRATION RATE: 16 BRPM | TEMPERATURE: 97.8 F | OXYGEN SATURATION: 98 %

## 2024-05-21 DIAGNOSIS — M24.9 HYPERMOBILITY OF JOINT: ICD-10-CM

## 2024-05-21 DIAGNOSIS — G89.29 CHRONIC LEFT SHOULDER PAIN: Primary | ICD-10-CM

## 2024-05-21 DIAGNOSIS — M25.512 CHRONIC LEFT SHOULDER PAIN: Primary | ICD-10-CM

## 2024-05-21 PROCEDURE — 99214 OFFICE O/P EST MOD 30 MIN: CPT | Performed by: NURSE PRACTITIONER

## 2024-05-21 ASSESSMENT — PAIN SCALES - GENERAL: PAINLEVEL: NO PAIN (0)

## 2024-05-21 NOTE — PROGRESS NOTES
"  Assessment & Plan     Chronic left shoulder pain, Hypermobility of joint  -4 points on the Beighton score:    1. Passive dorsiflexion and hyperextension of the fifth MCP joint beyond 90  right and left   2. Passive dorsiflexion of the left thumb to the flexor aspect of the forearm   3. Can rest palms of hands flat on floor with straight legs and forward flexion on trunk  -Discussed with patient the most likely cause of her recurrent shoulder pain, popping, and neck pain is hypermobility of the shoulder joint. Treatment is generally rest of the affected joint and avoid placing excessive strain on the joint, along with PT to strengthen the muscle to support the joint. Other possible causes include repetitive strain of the shoulder joint.  -Recommend referral to orthopedic specialist to evaluate for other possible causes and treatments   - Orthopedic  Referral; Future    Review of the result(s) of each unique test - MRI, xray          Subjective   Stacie is a 34 year old, presenting for the following health issues:  Shoulder Pain        5/21/2024    11:47 AM   Additional Questions   Roomed by Maura CANTU   Accompanied by self     Patient presents to clinic with concerns for left arm muscle tightness, soreness. This has been ongoing for several years, she states she has seen doctors and received imaging before but never got an explanation for her symptoms. She states her shoulder will sometimes pop then pain will radiate down her arm. Taking muscle relaxer for a few days and strict rest will make it better. When the pain is triggered \"cannot use left arm for anything.\" Happens about every couple months.     Triggers: Holding her child with her left arm, overhead reaching.     Has gone to physical therapy before    History of severe torticollis in 2021, got better with muscle relaxers.     No known injury or shoulder to arms.     Was in gymnastics as a child. Now works as a [employer].     Left neck strain 2017. " "    History of Present Illness       Reason for visit:  Left shoulder pain    She eats 2-3 servings of fruits and vegetables daily.She consumes 1 sweetened beverage(s) daily.She exercises with enough effort to increase her heart rate 20 to 29 minutes per day.  She exercises with enough effort to increase her heart rate 5 days per week.   She is taking medications regularly.                     Objective    /78   Pulse 75   Temp 97.8  F (36.6  C) (Tympanic)   Resp 16   Ht 1.626 m (5' 4\")   Wt 59.1 kg (130 lb 3.2 oz)   LMP 05/07/2024 (Approximate)   SpO2 98%   BMI 22.35 kg/m    Body mass index is 22.35 kg/m .  Physical Exam  Constitutional:       General: She is not in acute distress.     Appearance: Normal appearance.   HENT:      Right Ear: External ear normal.      Left Ear: External ear normal.   Eyes:      Pupils: Pupils are equal, round, and reactive to light.   Cardiovascular:      Rate and Rhythm: Normal rate.      Pulses: Normal pulses.      Heart sounds: Normal heart sounds. No murmur heard.     No friction rub. No gallop.   Pulmonary:      Effort: Pulmonary effort is normal. No respiratory distress.      Breath sounds: No wheezing, rhonchi or rales.   Musculoskeletal:      Left shoulder: Tenderness present. No swelling, deformity or effusion. Decreased range of motion.   Skin:     General: Skin is warm and dry.   Neurological:      General: No focal deficit present.      Mental Status: She is alert and oriented to person, place, and time.   Psychiatric:         Mood and Affect: Mood normal.         Behavior: Behavior normal.         Thought Content: Thought content normal.         Judgment: Judgment normal.                    Signed Electronically by: STEPHANIE Ribera CNP    "

## 2024-05-28 ENCOUNTER — ANCILLARY PROCEDURE (OUTPATIENT)
Dept: GENERAL RADIOLOGY | Facility: CLINIC | Age: 34
End: 2024-05-28
Attending: PHYSICIAN ASSISTANT
Payer: COMMERCIAL

## 2024-05-28 ENCOUNTER — OFFICE VISIT (OUTPATIENT)
Dept: ORTHOPEDICS | Facility: CLINIC | Age: 34
End: 2024-05-28
Attending: NURSE PRACTITIONER
Payer: COMMERCIAL

## 2024-05-28 VITALS
HEIGHT: 64 IN | DIASTOLIC BLOOD PRESSURE: 58 MMHG | TEMPERATURE: 97.9 F | WEIGHT: 134 LBS | BODY MASS INDEX: 22.88 KG/M2 | SYSTOLIC BLOOD PRESSURE: 98 MMHG

## 2024-05-28 DIAGNOSIS — M25.512 LEFT SHOULDER PAIN: ICD-10-CM

## 2024-05-28 DIAGNOSIS — M75.82 ROTATOR CUFF TENDINITIS, LEFT: Primary | ICD-10-CM

## 2024-05-28 DIAGNOSIS — M24.9 HYPERMOBILITY OF JOINT: ICD-10-CM

## 2024-05-28 PROCEDURE — 20610 DRAIN/INJ JOINT/BURSA W/O US: CPT | Mod: LT | Performed by: PHYSICIAN ASSISTANT

## 2024-05-28 PROCEDURE — 99203 OFFICE O/P NEW LOW 30 MIN: CPT | Mod: 25 | Performed by: PHYSICIAN ASSISTANT

## 2024-05-28 PROCEDURE — 73030 X-RAY EXAM OF SHOULDER: CPT | Mod: TC | Performed by: RADIOLOGY

## 2024-05-28 RX ORDER — TRIAMCINOLONE ACETONIDE 40 MG/ML
80 INJECTION, SUSPENSION INTRA-ARTICULAR; INTRAMUSCULAR
Status: SHIPPED | OUTPATIENT
Start: 2024-05-28

## 2024-05-28 RX ORDER — BUPIVACAINE HYDROCHLORIDE 5 MG/ML
3 INJECTION, SOLUTION PERINEURAL
Status: SHIPPED | OUTPATIENT
Start: 2024-05-28

## 2024-05-28 RX ADMIN — TRIAMCINOLONE ACETONIDE 80 MG: 40 INJECTION, SUSPENSION INTRA-ARTICULAR; INTRAMUSCULAR at 15:56

## 2024-05-28 RX ADMIN — BUPIVACAINE HYDROCHLORIDE 3 ML: 5 INJECTION, SOLUTION PERINEURAL at 15:56

## 2024-05-28 ASSESSMENT — PAIN SCALES - GENERAL: PAINLEVEL: MILD PAIN (3)

## 2024-05-28 NOTE — LETTER
5/28/2024         RE: Stacie Daniels  [address] [facility] [address]        Dear Colleague,    Thank you for referring your patient, Stacie Daniels, to the [facility]. Please see a copy of my visit note below.    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  She used to do gymnastics.  She enjoys hiking and paddle boarding.    She is being seen for   Chief Complaints and History of Present Illnesses   Patient presents with     Shoulder Pain     Left     Consult         History of Present Illness:   Mechanism of Injury: No injury fall or trauma  Location: Left lateral shoulder  Duration of Pain: Since 2017 so 7 years intermittently  Rating of Pain: 3 out of 10 currently  Pain Quality: Achy  Pain is better with: Rest  Pain is worse with: Activity above shoulder level.  Treatment so far consists of: Patient was seen by JONAH Yang on 5/21/2024.  At that point the patient was talking about difficulty holding child and using arm above shoulder level and pain several times a month and there was a referral placed for orthopedics.  Patient has tried physical therapy in the past but is not sure if it was specific for rotator cuff.  That physical therapy made things worse but then did get better.  Patient has modified her activities and use to rest which has helped.  Patient has had muscle relaxants in the past which has helped.  Patient has not [place] yet sling or steroid injection.  Patient has tried Tylenol and ibuprofen 4 mg twice daily and that has helped.  She has noticed rest helps also.   Associated Features: Denies shooting burning electric pain.  Patient has a lot of her pain with using her arm away from her body or above shoulder level.  She has not noticed any stiffness.  Prior history of related  problems: No previous surgery or trauma to the left shoulder.    Pain is Limiting: Heavy use of the left arm especially above shoulder level.  Here to: Orthopedic consultation  The Pain Has: Gotten better today  Additional History: Patient has difficulty holding her child at times, and pulling a wagon.      Patient's past medical, surgical, social and family histories reviewed.     Past Medical History:   Diagnosis Date     Anxiety      Chickenpox      Febrile convulsion (H)     toddler     Major depressive disorder, recurrent episode, mild (H24)     off zoloft     Ulcer     age 20        Past Surgical History:   Procedure Laterality Date     AS ENLARGE BREAST WITH IMPLANT       FOREIGN BODY REMOVAL      metal from her eye as a child     LAPAROTOMY MINI, TUBAL LIGATION (POST PARTUM), COMBINED Bilateral 12/22/2018    Procedure: COMBINED LAPAROTOMY MINI, TUBAL LIGATION (POST PARTUM);  Surgeon: Helen Smallwood MD;  Location: [facility]       Medications:  Current Outpatient Medications   Medication Sig Dispense Refill     acetaminophen (TYLENOL) 500 MG tablet Take 500 mg by mouth every 6 hours as needed for mild pain       busPIRone (BUSPAR) 5 MG tablet Take 10 mg by mouth at bedtime       hydrOXYzine (VISTARIL) 50 MG capsule Take 1 capsule (50 mg) by mouth nightly as needed for other (insomnia) 90 capsule 3     ibuprofen (ADVIL/MOTRIN) 200 MG capsule Take 200 mg by mouth every 4 hours as needed for fever       acetaminophen-caff-pyrilamine (MIDOL MENSTRUAL) 500-60-15 MG TABS per tablet Take 2 tablets by mouth every 6 hours as needed for mild pain (Patient not taking: Reported on 5/28/2024)       baclofen (LIORESAL) 10 MG tablet Take 1 tablet (10 mg) by mouth daily as needed for muscle spasms (Patient not taking: Reported on 5/28/2024) 30 tablet 0     No current facility-administered medications for this visit.       Allergies   Allergen Reactions     Amoxicillin-Pot Clavulanate Hives     Avocado Hives and Swelling      "Metronidazole Dizziness and Rash     Amoxicillin-Pot Clavulanate Hives       Social History     Occupational History     Employer: UNEMPLOYED   Tobacco Use     Smoking status: Former     Types: Cigarettes     Smokeless tobacco: Never   Vaping Use     Vaping status: Never Used   Substance and Sexual Activity     Alcohol use: Yes     Comment: rarely     Drug use: No     Sexual activity: Yes     Partners: Male     Birth control/protection: Female Surgical       Family History   Problem Relation Age of Onset     Depression Mother      Bipolar Disorder Mother      C.A.D. Father      Cancer Father         lymphoma     Prostate Cancer Father      Cancer Maternal Grandmother         rectal     Depression Maternal Grandmother      Cerebrovascular Disease Maternal Grandmother      Prostate Cancer Maternal Grandfather      Eye Surgery Paternal Grandmother         cataracts     Breast Cancer Paternal Grandmother      Hypertension Paternal Grandmother      Depression Paternal Grandmother      Thyroid Disease Paternal Grandmother      Hypertension Paternal Grandfather      Macular Degeneration Paternal Grandfather      Depression Sister      Bipolar Disorder Sister      Depression Sister      Schizophrenia Maternal Uncle      Suicide Paternal Aunt      Macular Degeneration Other      Diabetes No family hx of      Glaucoma No family hx of        REVIEW OF SYSTEMS  10 point review systems performed otherwise negative as noted as per history of present illness.    Physical Exam:  Vitals: BP 98/58 (BP Location: Right arm, Cuff Size: Adult Regular)   Temp 97.9  F (36.6  C) (Temporal)   Ht 1.626 m (5' 4\")   Wt 60.8 kg (134 lb)   LMP 05/07/2024 (Approximate)   BMI 23.00 kg/m    BMI= Body mass index is 23 kg/m .    Constitutional: healthy, alert and no acute distress   Psychiatric: mentation appears normal and affect normal/bright  NEURO: no focal deficits, CMS intact left upper extremity  RESP: Normal with easy respirations and no " use of accessory muscles to breathe, no audible wheezing or retractions  CV: +2 radial pulse and her hand is warm to palpation.   SKIN: No erythema, rashes, excoriation, or breakdown. No evidence of infection.   MUSCULOSKELETAL:  INSPECTION of left shoulder: No gross deformities, erythema, edema, ecchymosis, atrophy or fasciculations.   PALPATION: No tenderness AC joint, proximal biceps tendon, clavicle, lateral shoulder, posterior shoulder, trapezius area. No increased warmth noted.   ROM: Passive: Forward flexion to 180 , abduction to 180 , external rotation to 90 , internal rotation to lumbar spine.  All range of motion is without catching, locking or pain.  Range of motion is symmetrical but did not check internal range of motion on the contralateral side   STRENGTH: 5 out of 5 , deltoid as well as internal and external rotation   SPECIAL TEST: Patient has a negative cross over test, negative bear hug test and negative liftoff test, negative empty can and full can test but causes pain at the lateral shoulder, negative external rotator lag sign, negative drop test.  Negative crank test, negative apprehension test.  Patient does demonstrate a maneuver she does to resolve that seems to sublux the shoulder anteriorly.  Rotator cuff testing causes slight pain but mostly weakness but no significant deficits.  GAIT: non-antalgic  Lymph: no palpable lymph nodes    Diagnostic Modalities:  X-rays done today showing no fracture no dislocation no tumor no high riding humerus no downsloping acromion.  Acceptable joint space in the glenohumeral joint and also the AC joint.    Independent visualization of the images was performed.    Impression: 1.  Left shoulder rotator cuff tendinitis    Plan:  All of the above pertinent physical exam and imaging modalities findings was reviewed with Stacie and her daughters.    Large Joint Injection/Arthocentesis: L subacromial bursa    Date/Time: 5/28/2024 3:56 PM    Performed by:  Davis Pineda PA-C  Authorized by: Davis Pineda PA-C    Indications:  Pain  Needle Size:  22 G  Guidance: landmark guided    Approach:  Posterolateral  Location:  Shoulder      Site:  L subacromial bursa  Medications:  80 mg triamcinolone 40 MG/ML; 3 mL BUPivacaine 0.5 %  Aspirate amount (mL):  0  Procedure discussed: discussed risks, benefits, and alternatives    Consent Given by:  Patient  Timeout: timeout called immediately prior to procedure    Prep: patient was prepped and draped in usual sterile fashion     The skin was prepped with betadine. The patient was in a seated position. I used nataliia chloride spray prior to doing the injection.  The patient tolerated the injection well, and there were no complications. The injection site was covered with a Band-Aid.       FOCUSED PLAN:  Intermittent lateral shoulder pain for approximately 7 years and more consistent for the last 2 weeks and worse with activity above shoulder level.  She has had formal physical therapy previously but not specific for rotator cuff tendinitis.  I ordered physical therapy.  We will try steroid injection for treatment and diagnostics.  Rotator cuff is strong so I do not suspect a tear.  Recommended ibuprofen 800 mg 3 times a day with food for 4 days and then stop for anti-inflammatory treatment, she may not need this if the steroid injection works well.  Follow-up as needed    Re-x-ray on return: No      This note was dictated with [employer].    Davis Pineda PA-C        Again, thank you for allowing me to participate in the care of your patient.        Sincerely,        Davis Pineda PA-C

## 2024-05-28 NOTE — PROGRESS NOTES
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  She used to do gymnastics.  She enjoys hiking and paddle boarding.    She is being seen for   Chief Complaints and History of Present Illnesses   Patient presents with    Shoulder Pain     Left    Consult         History of Present Illness:   Mechanism of Injury: No injury fall or trauma  Location: Left lateral shoulder  Duration of Pain: Since 2017 so 7 years intermittently  Rating of Pain: 3 out of 10 currently  Pain Quality: Achy  Pain is better with: Rest  Pain is worse with: Activity above shoulder level.  Treatment so far consists of: Patient was seen by JONAH Yang on 5/21/2024.  At that point the patient was talking about difficulty holding child and using arm above shoulder level and pain several times a month and there was a referral placed for orthopedics.  Patient has tried physical therapy in the past but is not sure if it was specific for rotator cuff.  That physical therapy made things worse but then did get better.  Patient has modified her activities and use to rest which has helped.  Patient has had muscle relaxants in the past which has helped.  Patient has not [place] yet sling or steroid injection.  Patient has tried Tylenol and ibuprofen 4 mg twice daily and that has helped.  She has noticed rest helps also.   Associated Features: Denies shooting burning electric pain.  Patient has a lot of her pain with using her arm away from her body or above shoulder level.  She has not noticed any stiffness.  Prior history of related problems: No previous surgery or trauma to the left shoulder.    Pain is Limiting: Heavy use of the left arm especially above shoulder level.  Here to: Orthopedic consultation  The Pain Has: Gotten better today  Additional History: Patient has difficulty  holding her child at times, and pulling a wagon.      Patient's past medical, surgical, social and family histories reviewed.     Past Medical History:   Diagnosis Date    Anxiety     Chickenpox     Febrile convulsion (H)     toddler    Major depressive disorder, recurrent episode, mild (H24)     off zoloft    Ulcer     age 20        Past Surgical History:   Procedure Laterality Date    AS ENLARGE BREAST WITH IMPLANT      FOREIGN BODY REMOVAL      metal from her eye as a child    LAPAROTOMY MINI, TUBAL LIGATION (POST PARTUM), COMBINED Bilateral 12/22/2018    Procedure: COMBINED LAPAROTOMY MINI, TUBAL LIGATION (POST PARTUM);  Surgeon: Helen Smallwood MD;  Location: [facility]       Medications:  Current Outpatient Medications   Medication Sig Dispense Refill    acetaminophen (TYLENOL) 500 MG tablet Take 500 mg by mouth every 6 hours as needed for mild pain      busPIRone (BUSPAR) 5 MG tablet Take 10 mg by mouth at bedtime      hydrOXYzine (VISTARIL) 50 MG capsule Take 1 capsule (50 mg) by mouth nightly as needed for other (insomnia) 90 capsule 3    ibuprofen (ADVIL/MOTRIN) 200 MG capsule Take 200 mg by mouth every 4 hours as needed for fever      acetaminophen-caff-pyrilamine (MIDOL MENSTRUAL) 500-60-15 MG TABS per tablet Take 2 tablets by mouth every 6 hours as needed for mild pain (Patient not taking: Reported on 5/28/2024)      baclofen (LIORESAL) 10 MG tablet Take 1 tablet (10 mg) by mouth daily as needed for muscle spasms (Patient not taking: Reported on 5/28/2024) 30 tablet 0     No current facility-administered medications for this visit.       Allergies   Allergen Reactions    Amoxicillin-Pot Clavulanate Hives    Avocado Hives and Swelling    Metronidazole Dizziness and Rash    Amoxicillin-Pot Clavulanate Hives       Social History     Occupational History     Employer: UNEMPLOYED   Tobacco Use    Smoking status: Former     Types: Cigarettes    Smokeless tobacco: Never   Vaping Use    Vaping status: Never Used  "  Substance and Sexual Activity    Alcohol use: Yes     Comment: rarely    Drug use: No    Sexual activity: Yes     Partners: Male     Birth control/protection: Female Surgical       Family History   Problem Relation Age of Onset    Depression Mother     Bipolar Disorder Mother     C.A.D. Father     Cancer Father         lymphoma    Prostate Cancer Father     Cancer Maternal Grandmother         rectal    Depression Maternal Grandmother     Cerebrovascular Disease Maternal Grandmother     Prostate Cancer Maternal Grandfather     Eye Surgery Paternal Grandmother         cataracts    Breast Cancer Paternal Grandmother     Hypertension Paternal Grandmother     Depression Paternal Grandmother     Thyroid Disease Paternal Grandmother     Hypertension Paternal Grandfather     Macular Degeneration Paternal Grandfather     Depression Sister     Bipolar Disorder Sister     Depression Sister     Schizophrenia Maternal Uncle     Suicide Paternal Aunt     Macular Degeneration Other     Diabetes No family hx of     Glaucoma No family hx of        REVIEW OF SYSTEMS  10 point review systems performed otherwise negative as noted as per history of present illness.    Physical Exam:  Vitals: BP 98/58 (BP Location: Right arm, Cuff Size: Adult Regular)   Temp 97.9  F (36.6  C) (Temporal)   Ht 1.626 m (5' 4\")   Wt 60.8 kg (134 lb)   LMP 05/07/2024 (Approximate)   BMI 23.00 kg/m    BMI= Body mass index is 23 kg/m .    Constitutional: healthy, alert and no acute distress   Psychiatric: mentation appears normal and affect normal/bright  NEURO: no focal deficits, CMS intact left upper extremity  RESP: Normal with easy respirations and no use of accessory muscles to breathe, no audible wheezing or retractions  CV: +2 radial pulse and her hand is warm to palpation.   SKIN: No erythema, rashes, excoriation, or breakdown. No evidence of infection.   MUSCULOSKELETAL:  INSPECTION of left shoulder: No gross deformities, erythema, edema, " ecchymosis, atrophy or fasciculations.   PALPATION: No tenderness AC joint, proximal biceps tendon, clavicle, lateral shoulder, posterior shoulder, trapezius area. No increased warmth noted.   ROM: Passive: Forward flexion to 180 , abduction to 180 , external rotation to 90 , internal rotation to lumbar spine.  All range of motion is without catching, locking or pain.  Range of motion is symmetrical but did not check internal range of motion on the contralateral side   STRENGTH: 5 out of 5 , deltoid as well as internal and external rotation   SPECIAL TEST: Patient has a negative cross over test, negative bear hug test and negative liftoff test, negative empty can and full can test but causes pain at the lateral shoulder, negative external rotator lag sign, negative drop test.  Negative crank test, negative apprehension test.  Patient does demonstrate a maneuver she does to resolve that seems to sublux the shoulder anteriorly.  Rotator cuff testing causes slight pain but mostly weakness but no significant deficits.  GAIT: non-antalgic  Lymph: no palpable lymph nodes    Diagnostic Modalities:  X-rays done today showing no fracture no dislocation no tumor no high riding humerus no downsloping acromion.  Acceptable joint space in the glenohumeral joint and also the AC joint.    Independent visualization of the images was performed.    Impression: 1.  Left shoulder rotator cuff tendinitis    Plan:  All of the above pertinent physical exam and imaging modalities findings was reviewed with Stacie and her daughters.    Large Joint Injection/Arthocentesis: L subacromial bursa    Date/Time: 5/28/2024 3:56 PM    Performed by: Davis Pineda PA-C  Authorized by: Davis Pineda PA-C    Indications:  Pain  Needle Size:  22 G  Guidance: landmark guided    Approach:  Posterolateral  Location:  Shoulder      Site:  L subacromial bursa  Medications:  80 mg triamcinolone 40 MG/ML; 3 mL BUPivacaine 0.5 %  Aspirate amount (mL):   0  Procedure discussed: discussed risks, benefits, and alternatives    Consent Given by:  Patient  Timeout: timeout called immediately prior to procedure    Prep: patient was prepped and draped in usual sterile fashion     The skin was prepped with betadine. The patient was in a seated position. I used nataliia chloride spray prior to doing the injection.  The patient tolerated the injection well, and there were no complications. The injection site was covered with a Band-Aid.       FOCUSED PLAN:  Intermittent lateral shoulder pain for approximately 7 years and more consistent for the last 2 weeks and worse with activity above shoulder level.  She has had formal physical therapy previously but not specific for rotator cuff tendinitis.  I ordered physical therapy.  We will try steroid injection for treatment and diagnostics.  Rotator cuff is strong so I do not suspect a tear.  Recommended ibuprofen 800 mg 3 times a day with food for 4 days and then stop for anti-inflammatory treatment, she may not need this if the steroid injection works well.  Follow-up as needed    Re-x-ray on return: No      This note was dictated with [employer].    Davis Pineda PA-C

## 2024-06-18 ENCOUNTER — THERAPY VISIT (OUTPATIENT)
Dept: PHYSICAL THERAPY | Facility: CLINIC | Age: 34
End: 2024-06-18
Payer: COMMERCIAL

## 2024-06-18 DIAGNOSIS — M77.8 LEFT SHOULDER TENDINITIS: Primary | ICD-10-CM

## 2024-06-18 DIAGNOSIS — G89.29 CHRONIC NECK PAIN: ICD-10-CM

## 2024-06-18 DIAGNOSIS — M54.2 CHRONIC NECK PAIN: ICD-10-CM

## 2024-06-18 PROCEDURE — 97161 PT EVAL LOW COMPLEX 20 MIN: CPT | Mod: GP | Performed by: PHYSICAL THERAPIST

## 2024-06-18 PROCEDURE — 97110 THERAPEUTIC EXERCISES: CPT | Mod: GP | Performed by: PHYSICAL THERAPIST

## 2024-06-18 ASSESSMENT — ACTIVITIES OF DAILY LIVING (ADL)
PUTTING_ON_A_SHIRT_THAT_BUTTONS_DOWN_THE_FRONT: 5
REACHING_FOR_SOMETHING_ON_A_HIGH_SHELF: 10
PLACING_AN_OBJECT_ON_A_HIGH_SHELF: 10
AT_ITS_WORST?: 9
PUSHING_WITH_THE_INVOLVED_ARM: 1
WHEN_LYING_ON_THE_INVOLVED_SIDE: 8
PUTTING_ON_AN_UNDERSHIRT_OR_A_PULLOVER_SWEATER: 10
PLEASE_INDICATE_YOR_PRIMARY_REASON_FOR_REFERRAL_TO_THERAPY:: SHOULDER
WASHING_YOUR_HAIR?: 5
TOUCHING_THE_BACK_OF_YOUR_NECK: 3
CARRYING_A_HEAVY_OBJECT_OF_10_POUNDS: 10
PUTTING_ON_YOUR_PANTS: 5
WASHING_YOUR_BACK: 5
REMOVING_SOMETHING_FROM_YOUR_BACK_POCKET: 5

## 2024-06-18 NOTE — PROGRESS NOTES
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  Plans to return to being a  when her youngest daughter goes to school  Leisure mechanical stresses: was a gymnast; feels her L shoulder has always been stronger than the R until these symptoms began  Functional disability score (SPADI): 66.15  VAS score (0-10): 2/10 at rest/best; 10/10 at worst  Handedness (R/L): Right hand dominant    ADDITIONAL HISTORY  Present symptoms: annoying feeling in the suprascapula region.    Symptoms (improving/unchanging/worsening):  symptoms have improved since L shoulder injection on 5/28/2024  Symptoms commenced as a result of: uncertain; episodic torticollis   Condition occurred in the following environment: unknown    Symptoms at onset: left side of the neck and down the L UE    Paresthesia (yes/no):  no  Spinal history: yes     Cough/Sneeze (pos/neg):  positive when symptoms are present    Constant symptoms: posterior suprascapula region  Intermittent symptoms: L neck and symptoms into the L UE    Symptoms are worse with the following: Always Dressing, Always Reaching, Always On the move/repetitive use of the L UE, Sometimes Sleeping (prone/sup/side R/L) - laying on the L side   Symptoms are better with the following: Always When still/holding arm like she is in a sling and Other - hot bath, icy hot, hot bath, Tylenol, anti-inflammatory.  Will take a muscle relaxant at night when needed (takes the muscle relaxants if she can feel the torticollis is  starting)    Continued use makes the pain (better/worse/no effect): worse    Disturbed night (yes/no):  yes     Pain at rest (yes/no): yes    Site (neck/shoulder/elbow/wrist/hand): neck and shoulder    Other questions (swelling/catching/clicking/locking/subluxing):  clicking    Previous episodes: 2021  Previous treatments: PT for TOS, had shoulder injection 5/28/2024 (helpful)    Specific Questions:  General health (excellent/good/fair/poor):  good  Medical allergies:  Amoxicillin, Avocado, Metronidazole  Accidents (yes/no): when she was 17 y.o (2007) fell off her horse and landed on the L side of her neck and shoulder.  Unexplained weight loss (yes/no): no  Barriers at home: avoids using her L UE   Other red flags: no    Cervical MRI - FINDINGS: No fracture or subluxation. The cervical lordosis is reversed at C4. Vertebral body heights are normal. Marrow signal spinal cord signal are normal. The soft tissues are unremarkable. Facets articulate normally. The cervicomedullary junction is patent.  C2-3: Unremarkable.  C3-4: Generalized disc bulge. Disc/osteophyte complex causes mild  narrowing in the right neural foramen. The spinal canal and left  neural foramen are patent.  C4-5: The spinal canal and neural foramina are patent.  C5-6: Generalized disc bulge and uncinate spurring causes mild  narrowing of the neural foramina bilaterally. The spinal canal is patent.  C6-7: Shallow disc bulge. The spinal canal and neural foramina are patent.  C7-T1: Spinal canal are neural foramina are patent.  Prior therapy history for the same diagnosis, illness or injury: Yes      Prior Level of Function  Transfers: Independent  Ambulation: Independent  ADL: Independent  IADL:     Living Environment  Social support: With a significant other or spouse   Type of home: [facility]   Stairs to enter the home:         Ramp: No   Stairs inside the home: Yes       Help at home: Home and Yard maintenance tasks  Equipment owned:        Employment: No    Hobbies/Interests:      Patient goals for therapy: use both arms the same amount for everyday things    Pain assessment: See objective evaluation for additional pain details     Objective   SHOULDER EVALUATION  PAIN: Pain Level at Rest: 2/10  Pain Level with Use: 10/10  Pain Location: She feels like the symptoms start in the L shoulder and will go up into the L sided of the neck and down into the L hand.  Feels symptoms supra scapular region, like wants to pop her shoulder  Pain Quality: Aching, Dull, and annoying.  When symptoms are severe, she feels her neck and arm pain; will have to sleep prone with her head turned away and shoulder in ER with arm extended.    Pain Frequency: constant  Pain is Worst: toward the end of the day if she uses her arm during the day; more dependent on use vs time of the day  Pain is Exacerbated By: carrying, any use of the L UE, any reaching above shoulder height  Pain is Relieved By: rest and hot bath, icy hot, holding her arm like she is in a sling, Tylenol, Ibuprofen, muscle relaxant at night alejandro  Pain Progression: improved since injection on 5/28/204  Sites for physical examination (neck/shoulder/elbow/wrist/hand): neck, shoulder    Posture:  Sitting: slight forward head posture with slight lateral shift   Change of posture: No effect, support did feel good Standing: Neutral  Other observations:  increased muscle tone L side of the neck and L UT  Does note that she has had torticollis and will try to take the muscle relaxant to alleviate the symptoms but has had to go to the ER on 2 occasions due to the symptoms.  When symptoms are severe she will notice that her L shoulder will roll forward and swell/elevate at the L UT region.      Baselines (pain or functional activity): 2/10 L UT/suprascapular annoyance, weakness with L shoulder IR and extension, deviation and PDM for neck extension    Other Tests: (-) lift off test    Spine:  Movement Loss: min loss of  neck extension with deviation to the Rt and PDM.  Nil loss flexion, symmetrical rotation, pulling on the L with lateral bending R, nil/min loss lateral bend L  Effect of repeated movements:   Repeated neck retraction - inc PDM, W - felt the need to crack her shoulder, dev with neck ext continued, strength better for ext, a little better with IR  Repeated side bend L - decreased feeling of needing to crack the L shoulder (annoyance) after lateral bending L, no longer had a deviation with neck extension and full strength IR and extension.  Effect of static positioning: not assessed  Spine testing: Relevant, shoulder symptoms improved, improved L shoulder strength and decreased pain with neck ext without deviation Rt.     Baseline Symptoms: not assessed today as did have a favorable response to cervical side bending L.    Repeated Tests Symptom Response Mechanical Response   Active/Passive movement, resisted test, functional test During - Produce, Abolish, Increase, Decrease, NE After -   Better, Worse, NB, NW, NE Effect -   ? or ? ROM, strength or key functional test No Effect                               Effect of static positioning           Provisional Classification (Extremity/Spine): Spine - Derangement - Asymmetrical, unilateral, symptoms above elbow      Potential Drivers of Pain and/or Disability: Cognitive-Emotional, due to severity of symptoms in the past, fearful to use arm.    Principle of Management:  Education:  discussed proper sitting posture and use of rolled towel to support her upper back.  Monitor symptoms before and after performing the neck exercise.  Determine whether the annoying sense in the L shoulder is consistently impacted by doing the neck exercise.  If does not feel there is any change in the shoulder, we can further assess at next session  Equipment provided:  none  Exercise type:  repeated neck side bend L 10 reps  Frequency:  4-6 times a day    AROM: full and symmetrical active  shoulder ROM without pain.    PROM/End feel: hard end feel with passive L shoulder ER otherwise has greater passive motion on the L vs the R, with springy end feel.      STRENGTH:  full and painfree on the R.  Weakness on the L with IR 4/5 and Ext (very shaky) 4/5    CERVICAL SCREEN:   (Degrees) Left AROM Right AROM   Cervical Flexion nil   Cervical Extension Min loss with dev R and inc pain on the L side of the neck/scap area   Side bend Nil/min loss NE Nil loss   Rotation Nil/min loss with  inc sym on L  Nil loss   Thoracic Flexion    Thoracic Extension    Thoracic Rotation     Thoracic Outlet Screen (Corazon, Adson)       Assessment & Plan   CLINICAL IMPRESSIONS  Medical Diagnosis: L Rotator cuff tendinitis    Treatment Diagnosis: L shoulder pain; neck pain   Impression/Assessment: Patient is a 34 year old female with L sided shoulder and neck  complaints.  The following significant findings have been identified: Pain, Decreased ROM/flexibility, Decreased strength, Decreased activity tolerance, and Impaired posture. These impairments interfere with their ability to perform self care tasks, work tasks, recreational activities, household chores, and driving  as compared to previous level of function.     Clinical Decision Making (Complexity):  Clinical Presentation: Stable/Uncomplicated  Clinical Presentation Rationale: based on medical and personal factors listed in PT evaluation  Clinical Decision Making (Complexity): Low complexity    PLAN OF CARE  Treatment Interventions:  Interventions: Manual Therapy, Neuromuscular Re-education, Therapeutic Activity, Therapeutic Exercise, Self-Care/Home Management    Long Term Goals     PT Goal 1  Goal Identifier: reaching, use of L UE  Goal Description: patient will be able to use her L arm off and on during the day without symptoms produced in the neck, L shoulder nor L UE  Rationale: to maximize safety and independence with performance of ADLs and functional tasks;to maximize  safety and independence with self cares;to maximize safety and independence within the home  Target Date: 09/10/24      Frequency of Treatment: 1 time a week  Duration of Treatment: 12 weeks    Recommended Referrals to Other Professionals:   Education Assessment:   Learner/Method: (P) Patient;Listening;Demonstration;Pictures/Video;No Barriers to Learning  Education Comments: (P) PTRx on her phone    Risks and benefits of evaluation/treatment have been explained.   Patient/Family/caregiver agrees with Plan of Care.     Evaluation Time:     PT Eval, Low Complexity Minutes (90990): (P) 25   Present: Not applicable     Signing Clinician: Kati Lobo, PT        [facility]                                                                                   OUTPATIENT PHYSICAL THERAPY      PLAN OF TREATMENT FOR OUTPATIENT REHABILITATION   Patient's Last Name, First Name, ACEKevinMORENOKevin  DanielsStacie  RAUL YOB: 1990   Provider's Name   [facility]   Medical Record No.  [id number]     Onset Date: 05/28/24 (PT referral date)  Start of Care Date: 06/18/24     Medical Diagnosis:  L Rotator cuff tendinitis      PT Treatment Diagnosis:  L shoulder pain; neck pain Plan of Treatment  Frequency/Duration: 1 time a week/ 12 weeks    Certification date from 06/18/24 to 09/10/24         See note for plan of treatment details and functional goals     Kati Lobo, PT                         I CERTIFY THE NEED FOR THESE SERVICES FURNISHED UNDER        THIS PLAN OF TREATMENT AND WHILE UNDER MY CARE     (Physician attestation of this document indicates review and certification of the therapy plan).              Referring Provider:  Davis Pineda    Initial Assessment  See Epic Evaluation- Start of Care Date: 06/18/24

## 2024-07-30 NOTE — PROGRESS NOTES
"CC: Here for routine prenatal visit @ 28w6d   HPI: + FM, no ctx, no LOF, no VB.  No complaints.     PE: /64 (BP Location: Left arm, Patient Position: Chair, Cuff Size: Adult Regular)  Pulse 103  Temp 97.7  F (36.5  C) (Tympanic)  Resp 16  Ht 5' 2\" (1.575 m)  Wt 152 lb (68.9 kg)  LMP  (LMP Unknown)  Breastfeeding? No  BMI 27.8 kg/m2   See OB flowsheet    A/P  @ 28w6d normal pregnancy    1. Routine prenatal care    RTC 2-3 weeks.      Helen Smallwood M.D.    " How Severe Is Your Skin Discoloration?: mild

## 2024-09-02 PROBLEM — G89.29 CHRONIC NECK PAIN: Status: RESOLVED | Noted: 2024-06-18 | Resolved: 2024-09-02

## 2024-09-02 PROBLEM — M77.8 LEFT SHOULDER TENDINITIS: Status: RESOLVED | Noted: 2024-06-18 | Resolved: 2024-09-02

## 2024-09-02 PROBLEM — M54.2 CHRONIC NECK PAIN: Status: RESOLVED | Noted: 2024-06-18 | Resolved: 2024-09-02

## 2024-09-12 ENCOUNTER — OFFICE VISIT (OUTPATIENT)
Dept: FAMILY MEDICINE | Facility: CLINIC | Age: 34
End: 2024-09-12
Payer: COMMERCIAL

## 2024-09-12 VITALS
RESPIRATION RATE: 16 BRPM | WEIGHT: 123.4 LBS | BODY MASS INDEX: 22.71 KG/M2 | HEIGHT: 62 IN | DIASTOLIC BLOOD PRESSURE: 69 MMHG | OXYGEN SATURATION: 100 % | TEMPERATURE: 98 F | SYSTOLIC BLOOD PRESSURE: 104 MMHG | HEART RATE: 85 BPM

## 2024-09-12 DIAGNOSIS — R22.1 NODULE OF NECK: ICD-10-CM

## 2024-09-12 DIAGNOSIS — G43.909 MIGRAINE WITHOUT STATUS MIGRAINOSUS, NOT INTRACTABLE, UNSPECIFIED MIGRAINE TYPE: Primary | ICD-10-CM

## 2024-09-12 DIAGNOSIS — F41.9 ANXIETY: ICD-10-CM

## 2024-09-12 LAB
ANION GAP SERPL CALCULATED.3IONS-SCNC: 11 MMOL/L (ref 7–15)
BASOPHILS # BLD AUTO: 0 10E3/UL (ref 0–0.2)
BASOPHILS NFR BLD AUTO: 0 %
BUN SERPL-MCNC: 12.8 MG/DL (ref 6–20)
CALCIUM SERPL-MCNC: 9.6 MG/DL (ref 8.8–10.4)
CHLORIDE SERPL-SCNC: 104 MMOL/L (ref 98–107)
CREAT SERPL-MCNC: 0.83 MG/DL (ref 0.51–0.95)
EGFRCR SERPLBLD CKD-EPI 2021: >90 ML/MIN/1.73M2
EOSINOPHIL # BLD AUTO: 0.1 10E3/UL (ref 0–0.7)
EOSINOPHIL NFR BLD AUTO: 1 %
ERYTHROCYTE [DISTWIDTH] IN BLOOD BY AUTOMATED COUNT: 12 % (ref 10–15)
ERYTHROCYTE [SEDIMENTATION RATE] IN BLOOD BY WESTERGREN METHOD: 4 MM/HR (ref 0–20)
GLUCOSE SERPL-MCNC: 82 MG/DL (ref 70–99)
HCO3 SERPL-SCNC: 25 MMOL/L (ref 22–29)
HCT VFR BLD AUTO: 43.2 % (ref 35–47)
HGB BLD-MCNC: 14.2 G/DL (ref 11.7–15.7)
IMM GRANULOCYTES # BLD: 0 10E3/UL
IMM GRANULOCYTES NFR BLD: 0 %
LYMPHOCYTES # BLD AUTO: 2.4 10E3/UL (ref 0.8–5.3)
LYMPHOCYTES NFR BLD AUTO: 25 %
MAGNESIUM SERPL-MCNC: 2 MG/DL (ref 1.7–2.3)
MCH RBC QN AUTO: 31.8 PG (ref 26.5–33)
MCHC RBC AUTO-ENTMCNC: 32.9 G/DL (ref 31.5–36.5)
MCV RBC AUTO: 97 FL (ref 78–100)
MONOCYTES # BLD AUTO: 0.5 10E3/UL (ref 0–1.3)
MONOCYTES NFR BLD AUTO: 5 %
NEUTROPHILS # BLD AUTO: 6.5 10E3/UL (ref 1.6–8.3)
NEUTROPHILS NFR BLD AUTO: 69 %
PLATELET # BLD AUTO: 225 10E3/UL (ref 150–450)
POTASSIUM SERPL-SCNC: 4 MMOL/L (ref 3.4–5.3)
RBC # BLD AUTO: 4.47 10E6/UL (ref 3.8–5.2)
SODIUM SERPL-SCNC: 140 MMOL/L (ref 135–145)
TSH SERPL DL<=0.005 MIU/L-ACNC: 1.22 UIU/ML (ref 0.3–4.2)
WBC # BLD AUTO: 9.5 10E3/UL (ref 4–11)

## 2024-09-12 PROCEDURE — 80048 BASIC METABOLIC PNL TOTAL CA: CPT | Performed by: PHYSICIAN ASSISTANT

## 2024-09-12 PROCEDURE — 83735 ASSAY OF MAGNESIUM: CPT | Performed by: PHYSICIAN ASSISTANT

## 2024-09-12 PROCEDURE — 84443 ASSAY THYROID STIM HORMONE: CPT | Performed by: PHYSICIAN ASSISTANT

## 2024-09-12 PROCEDURE — 85025 COMPLETE CBC W/AUTO DIFF WBC: CPT | Performed by: PHYSICIAN ASSISTANT

## 2024-09-12 PROCEDURE — 36415 COLL VENOUS BLD VENIPUNCTURE: CPT | Performed by: PHYSICIAN ASSISTANT

## 2024-09-12 PROCEDURE — 99214 OFFICE O/P EST MOD 30 MIN: CPT | Performed by: PHYSICIAN ASSISTANT

## 2024-09-12 PROCEDURE — 85652 RBC SED RATE AUTOMATED: CPT | Performed by: PHYSICIAN ASSISTANT

## 2024-09-12 RX ORDER — ONDANSETRON 4 MG/1
4 TABLET, ORALLY DISINTEGRATING ORAL EVERY 8 HOURS PRN
Qty: 8 TABLET | Refills: 0 | Status: SHIPPED | OUTPATIENT
Start: 2024-09-12

## 2024-09-12 RX ORDER — HYDROXYZINE PAMOATE 50 MG/1
50 CAPSULE ORAL
Qty: 90 CAPSULE | Refills: 3 | OUTPATIENT
Start: 2024-09-12

## 2024-09-12 RX ORDER — BUSPIRONE HYDROCHLORIDE 10 MG/1
10 TABLET ORAL AT BEDTIME
Qty: 90 TABLET | Refills: 0 | Status: SHIPPED | OUTPATIENT
Start: 2024-09-12

## 2024-09-12 RX ORDER — AMITRIPTYLINE HYDROCHLORIDE 10 MG/1
10 TABLET ORAL AT BEDTIME
Qty: 30 TABLET | Refills: 1 | Status: SHIPPED | OUTPATIENT
Start: 2024-09-12

## 2024-09-12 RX ORDER — BUSPIRONE HYDROCHLORIDE 5 MG/1
10 TABLET ORAL AT BEDTIME
Status: CANCELLED | OUTPATIENT
Start: 2024-09-12

## 2024-09-12 ASSESSMENT — PAIN SCALES - GENERAL: PAINLEVEL: NO PAIN (0)

## 2024-09-12 ASSESSMENT — ENCOUNTER SYMPTOMS: HEADACHES: 1

## 2024-09-12 NOTE — PROGRESS NOTES
Assessment & Plan     (G43.909) Migraine without status migrainosus, not intractable, unspecified migraine type  (primary encounter diagnosis)  Comment: Patient presents for evaluation of ongoing migraine headaches.  Patient has had more stress as of late with exacerbation of migraine headaches.  Discussed with patient amitriptyline prophylactic medications.  Discussed recheck of labs that she has been nauseated and vomiting.  Zofran provided for nausea.  Referral for neurology.  If patient has any worsening new concerns will be seen again more urgently.  I did discuss with patient decreasing ibuprofen use that she can dizziness 15 days/month this could be contributing to have medication induced headaches.  Plan: CBC with platelets and differential,         amitriptyline (ELAVIL) 10 MG tablet, Basic         metabolic panel  (Ca, Cl, CO2, Creat, Gluc, K,         Na, BUN), Magnesium, ESR: Erythrocyte         sedimentation rate, ondansetron (ZOFRAN ODT) 4         MG ODT tab, Adult Neurology  Referral          (R22.1) Nodule of neck  Comment: Nodule of neck.  Reviewed with patient ultrasound neck soft tissue.  Will also check thyroid hormones.  Potential CT head neck pending results.  No evidence of abscess requiring drainage.  Patient will continue monitor.  Plan: US Head Neck Soft Tissue, TSH with free T4         reflex               Subjective   Stacie is a 34 year old, presenting for the following health issues:  Refill Request, Lump  (Lump under chin. ), and Headache (Migraine headache in left eye. )      9/12/2024    11:29 AM   Additional Questions   Roomed by ERIC SANZ   Accompanied by SELF     Headache        Patient with a history of migraine headaches.  Had been evaluated for headaches going back to the age of 20.  Has been using Tylenol and ibuprofen for headache relief.  Reports onset of discomfort towards the left eye eyebrow.  No recent eye exams.  Patient states she has been using ibuprofen  "up to 15 days/month.  With migraine headaches occasionally becomes nauseated to the point of vomiting.  Typical migraine symptoms include sensitivities to light and sounds.  Patient has been under more stress over the same timeframe as the symptoms.  Has not been on any preventative medication for migraines in the past.    Further, patient reports a lump that she is felt underneath her chin towards the right side.  Family history of lymphoma.  Patient is concerned of possible cyst.   no redness to the site.           Review of Systems  Constitutional, HEENT, cardiovascular, pulmonary, gi and gu systems are negative, except as otherwise noted.      Objective    /69   Pulse 85   Temp 98  F (36.7  C) (Tympanic)   Resp 16   Ht 1.562 m (5' 1.5\")   Wt 56 kg (123 lb 6.4 oz)   SpO2 100%   BMI 22.94 kg/m    Body mass index is 22.94 kg/m .  Physical Exam   GENERAL: alert and no distress  EYES: Eyes grossly normal to inspection, PERRL and conjunctivae and sclerae normal  HENT: ear canals and TM's normal, nose and mouth without ulcers or lesions  NECK: no adenopathy, thyroid normal to palpation, and possible 0.5 cm nodule submandibular region more towards the right.  RESP: lungs clear to auscultation - no rales, rhonchi or wheezes  CV: regular rate and rhythm, normal S1 S2, no S3 or S4, no murmur, click or rub, no peripheral edema  ABDOMEN: soft, nontender, no hepatosplenomegaly, no masses and bowel sounds normal  MS: no gross musculoskeletal defects noted, no edema  NEURO: Normal strength and tone, sensory exam grossly normal, mentation intact, cranial nerves 2-12 intact, and normal gait    Signed Electronically by: Carroll Gates PA-C    "

## 2024-09-24 ENCOUNTER — ANCILLARY PROCEDURE (OUTPATIENT)
Dept: ULTRASOUND IMAGING | Facility: OTHER | Age: 34
End: 2024-09-24
Attending: PHYSICIAN ASSISTANT
Payer: COMMERCIAL

## 2024-09-24 DIAGNOSIS — R22.1 NODULE OF NECK: ICD-10-CM

## 2024-09-24 PROCEDURE — 76536 US EXAM OF HEAD AND NECK: CPT | Mod: TC | Performed by: RADIOLOGY

## 2024-10-14 ENCOUNTER — OFFICE VISIT (OUTPATIENT)
Dept: FAMILY MEDICINE | Facility: CLINIC | Age: 34
End: 2024-10-14
Payer: COMMERCIAL

## 2024-10-14 VITALS
WEIGHT: 122 LBS | RESPIRATION RATE: 16 BRPM | BODY MASS INDEX: 22.45 KG/M2 | OXYGEN SATURATION: 100 % | DIASTOLIC BLOOD PRESSURE: 68 MMHG | SYSTOLIC BLOOD PRESSURE: 103 MMHG | TEMPERATURE: 98 F | HEIGHT: 62 IN | HEART RATE: 69 BPM

## 2024-10-14 DIAGNOSIS — R10.9 FLANK PAIN: ICD-10-CM

## 2024-10-14 DIAGNOSIS — K80.50 BILIARY COLIC SYMPTOM: Primary | ICD-10-CM

## 2024-10-14 DIAGNOSIS — R10.11 RUQ ABDOMINAL PAIN: ICD-10-CM

## 2024-10-14 LAB
ALBUMIN UR-MCNC: NEGATIVE MG/DL
APPEARANCE UR: CLEAR
BILIRUB UR QL STRIP: NEGATIVE
COLOR UR AUTO: YELLOW
GLUCOSE UR STRIP-MCNC: NEGATIVE MG/DL
HGB UR QL STRIP: NEGATIVE
KETONES UR STRIP-MCNC: NEGATIVE MG/DL
LEUKOCYTE ESTERASE UR QL STRIP: NEGATIVE
NITRATE UR QL: NEGATIVE
PH UR STRIP: 7 [PH] (ref 5–7)
SP GR UR STRIP: 1.02 (ref 1–1.03)
UROBILINOGEN UR STRIP-ACNC: 0.2 E.U./DL

## 2024-10-14 PROCEDURE — 81003 URINALYSIS AUTO W/O SCOPE: CPT | Performed by: PHYSICIAN ASSISTANT

## 2024-10-14 PROCEDURE — 99214 OFFICE O/P EST MOD 30 MIN: CPT | Performed by: PHYSICIAN ASSISTANT

## 2024-10-14 NOTE — PROGRESS NOTES
"  Assessment & Plan     Biliary colic symptom  ER records, labs and imaging reviewed  - NM Hepatobiliary Scan with GB EF and/or Pharm; Future    RUQ abdominal pain  - Helicobacter pylori Antigen Stool    Flank pain  - UA Macroscopic with reflex to Microscopic and Culture - Lab Collect    We will rule out biliary dyskinesia, pyelonephritis, and h-pylori. If all testing negative, recommend both EGD and colonoscopy.     Discussed pain control. Unfortunately, Tylenol can affect the liver and NSAIDs can affect the stomach and we are not certain where this pain is originating. Cautioned not to take OTC pain meds unless necessary.     MED REC REQUIRED  Post Medication Reconciliation Status:  No new medications.       Subjective   Stacie is a 34 year old, presenting for the following health issues:  ER F/U (Gallbladder issue , looking for imaging and referral. ) and Referral (Pt is requesting a referral for scan and gallbladder removal.)        10/14/2024     9:17 AM   Additional Questions   Roomed by Sheila   Accompanied by self         10/14/2024     9:17 AM   Patient Reported Additional Medications   Patient reports taking the following new medications n/a     Stacie is a very pleasant 34-year-old female who presents to clinic for intermittent, spasmodic right upper quadrant pain that began approximately 2 weeks ago.  When it began, she had an entire day of sharp, stabbing pain in the right upper quadrant.  She was seen in the emergency room on October 9 and both labs and imaging were normal at that time.  She was in excruciating amount of pain but did not have any pain medication given to her.  She reports that over the weekend she was on a trip and had some moderately significant pain during that time.  Yesterday the pain started to improve.  She has had no changes in bowel or bladder.  However, her bowels are irregular.  She states that she has a \"bad stomach.\"  The pain currently feels like an intermittent squeeze in " "the right upper quadrant if she bends forward, the pain increases.  She has had some vomiting, but states this is not an unusual phenomenon for her.  She does not drink alcohol.  She does not have any urinary symptoms, but her urinalysis was not run in the emergency room either.  No sign of kidney stone on imaging.  She does report having 1 day of flank pain at some point during the last 2 weeks.  She has not had an endoscopy (upper or lower and 6.  There is no significant family history of stomach issues.  There are multiple people in her family with cancer (dad had prostate and now has B cell small lymphoma, paternal grandmother breast cancer, maternal grandmother colon cancer, niece with a Wilms tumor).  No fevers, chills, chest pain, shortness of breath (except when the pain starts).  The provider in the ER recommended that she see primary care to have a HIDA scan done. She is taking Tylenol for pain because she was told NSAIDs could affect her stomach.          Review of Systems  Constitutional, HEENT, cardiovascular, pulmonary, gi and gu systems are negative, except as otherwise noted.      Objective    /68   Pulse 69   Temp 98  F (36.7  C) (Tympanic)   Resp 16   Ht 1.562 m (5' 1.5\")   Wt 55.3 kg (122 lb)   LMP 10/14/2024 (Exact Date)   SpO2 100%   BMI 22.68 kg/m    Body mass index is 22.68 kg/m .  Physical Exam  Vitals reviewed.   Constitutional:       Appearance: Normal appearance. She is not ill-appearing.   Pulmonary:      Effort: Pulmonary effort is normal.   Neurological:      General: No focal deficit present.      Mental Status: She is alert and oriented to person, place, and time.   Psychiatric:         Mood and Affect: Mood normal.         Thought Content: Thought content normal.         Judgment: Judgment normal.        Exam from ER visit reviewed.    Signed Electronically by: ROMAN BARRERA PA-C    "

## 2024-10-23 ENCOUNTER — OFFICE VISIT (OUTPATIENT)
Dept: FAMILY MEDICINE | Facility: OTHER | Age: 34
End: 2024-10-23
Payer: COMMERCIAL

## 2024-10-23 VITALS
DIASTOLIC BLOOD PRESSURE: 72 MMHG | OXYGEN SATURATION: 98 % | HEART RATE: 73 BPM | RESPIRATION RATE: 19 BRPM | TEMPERATURE: 98.7 F | WEIGHT: 124 LBS | SYSTOLIC BLOOD PRESSURE: 96 MMHG | HEIGHT: 62 IN | BODY MASS INDEX: 22.82 KG/M2

## 2024-10-23 DIAGNOSIS — R07.81 RIB PAIN ON RIGHT SIDE: Primary | ICD-10-CM

## 2024-10-23 DIAGNOSIS — F41.9 ANXIETY: ICD-10-CM

## 2024-10-23 DIAGNOSIS — K59.00 CONSTIPATION, UNSPECIFIED CONSTIPATION TYPE: ICD-10-CM

## 2024-10-23 PROCEDURE — G2211 COMPLEX E/M VISIT ADD ON: HCPCS

## 2024-10-23 PROCEDURE — 99213 OFFICE O/P EST LOW 20 MIN: CPT

## 2024-10-23 RX ORDER — BUSPIRONE HYDROCHLORIDE 10 MG/1
10 TABLET ORAL AT BEDTIME
Qty: 90 TABLET | Refills: 1 | Status: SHIPPED | OUTPATIENT
Start: 2024-10-23

## 2024-10-23 ASSESSMENT — PAIN SCALES - GENERAL: PAINLEVEL_OUTOF10: MILD PAIN (2)

## 2024-10-23 NOTE — PROGRESS NOTES
Assessment & Plan     Rib pain on right side  Patient was seen in ED 10/9 for possible biliary colic. Work-up at that time was normal. She was recommended to have HIDA scan completed and this is scheduled for 11/6 in [place].  Patient has had some improvements in her symptoms and she is now feeling more of a pinch and a dull ache as opposed to more of a stabbing pain.  She locates this over the area of her inferior ribs on the right side.  On exam, she has generalized abdominal tenderness which is mild.  Negative Rosenthal sign.  Not convincing for gallbladder or biliary issue.  She will benefit from HIDA scan.  Her symptoms may also represent an MSK etiology such as costochondritis versus chest wall/breast implant abnormality. Will have her follow-up with breast ultrasound- most recently completed in 2021 was normal at that time.  Encouraged to keep a food diary, although her current symptoms do not seem associated with meals.  She can trial voltaren over the area of her pain.  - diclofenac (VOLTAREN) 1 % topical gel; Apply 2 g topically 4 times daily.  - US Breast Right Limited 1-3 Quadrants; Future    Constipation, unspecified constipation type    Anxiety  Stable. Refill buspar.  - busPIRone (BUSPAR) 10 MG tablet; Take 1 tablet (10 mg) by mouth at bedtime.      MED REC REQUIRED  Post Medication Reconciliation Status: discharge medications reconciled, continue medications without change    Follow-up for anxiety as well as preventative care visit.    Casie Quinones is a 34 year old, presenting for the following health issues:  ER F/U      10/23/2024    10:46 AM   Additional Questions   Roomed by moncho   Accompanied by self     History of Present Illness       Reason for visit:  Pain in right upper ribs    She eats 2-3 servings of fruits and vegetables daily.She consumes 1 sweetened beverage(s) daily.She exercises with enough effort to increase her heart rate 20 to 29 minutes per day.  She exercises with enough  "effort to increase her heart rate 5 days per week.   She is taking medications regularly.     Patient was seen in the [facility] ED on 10/9 with right sided chest/abdominal pain.  Workup was unremarkable including an abdominal ultrasound.  She was recommended to have a HIDA scan completed in the outpatient setting and has a scheduled for 11/6.    She reports ongoing pinching and discomfort feeling just under right breast/ribs.  Has been more dull than it was previously.  She does note that she typically \"has a sensitive stomach\" and is often eating grazing meals throughout the day and not having large meals.    She does endorse constipation which is typical for her, unchanged.  Has a bowel movement about every 4-5 days.  Denies black or bloody stools, no discoloration/pales stool.  No urinary symptoms.    ED/UC Followup:    Facility:  [facility]  Date of visit: 10/9/24  Reason for visit: Abdominal   Current Status: Improving        Objective    BP 96/72   Pulse 73   Temp 98.7  F (37.1  C) (Temporal)   Resp 19   Ht 1.567 m (5' 1.69\")   Wt 56.2 kg (124 lb)   LMP 10/14/2024 (Exact Date)   SpO2 98%   BMI 22.91 kg/m    Body mass index is 22.91 kg/m .  Physical Exam  Constitutional:       General: She is not in acute distress.     Appearance: Normal appearance. She is not ill-appearing.   Cardiovascular:      Rate and Rhythm: Normal rate and regular rhythm.      Heart sounds: No murmur heard.  Pulmonary:      Effort: Pulmonary effort is normal. No respiratory distress.      Breath sounds: Normal breath sounds. No wheezing.   Abdominal:      General: There is distension (mild).      Palpations: Abdomen is soft.      Tenderness: There is abdominal tenderness (mild, generalized).      Comments: Negative desai sign     Musculoskeletal:      Comments: Mild tenderness over inferior right ribs, near midclavicular.   Skin:     General: Skin is warm and dry.   Neurological:      General: No focal " deficit present.      Mental Status: She is alert and oriented to person, place, and time.   Psychiatric:         Mood and Affect: Mood normal.         Behavior: Behavior normal.          U/s abdomen (10/9):  Normal exam.    CXR (10/9):  Norml.        Signed Electronically by: Edgar Fisher DO

## 2024-11-06 ENCOUNTER — HOSPITAL ENCOUNTER (OUTPATIENT)
Dept: NUCLEAR MEDICINE | Facility: CLINIC | Age: 34
Setting detail: NUCLEAR MEDICINE
Discharge: HOME | End: 2024-11-06
Attending: PHYSICIAN ASSISTANT | Admitting: PHYSICIAN ASSISTANT
Payer: COMMERCIAL

## 2024-11-06 DIAGNOSIS — K80.50 BILIARY COLIC SYMPTOM: ICD-10-CM

## 2024-11-06 PROCEDURE — 78227 HEPATOBIL SYST IMAGE W/DRUG: CPT

## 2024-11-06 PROCEDURE — 250N000011 HC RX IP 250 OP 636: Performed by: PHYSICIAN ASSISTANT

## 2024-11-06 PROCEDURE — 258N000003 HC RX IP 258 OP 636: Performed by: PHYSICIAN ASSISTANT

## 2024-11-06 PROCEDURE — 343N000001 HC RX 343 MED OP 636: Performed by: PHYSICIAN ASSISTANT

## 2024-11-06 PROCEDURE — A9537 TC99M MEBROFENIN: HCPCS | Performed by: PHYSICIAN ASSISTANT

## 2024-11-06 RX ORDER — KIT FOR THE PREPARATION OF TECHNETIUM TC 99M MEBROFENIN 45 MG/10ML
4.1 INJECTION, POWDER, LYOPHILIZED, FOR SOLUTION INTRAVENOUS ONCE
Status: COMPLETED | OUTPATIENT
Start: 2024-11-06 | End: 2024-11-06

## 2024-11-06 RX ADMIN — MEBROFENIN 4.1 MILLICURIE: 45 INJECTION, POWDER, LYOPHILIZED, FOR SOLUTION INTRAVENOUS at 12:08

## 2024-11-06 RX ADMIN — SODIUM CHLORIDE 1.1 MCG: 9 INJECTION, SOLUTION INTRAMUSCULAR; INTRAVENOUS; SUBCUTANEOUS at 13:00

## 2024-12-03 ENCOUNTER — OFFICE VISIT (OUTPATIENT)
Dept: FAMILY MEDICINE | Facility: CLINIC | Age: 34
End: 2024-12-03
Payer: COMMERCIAL

## 2024-12-03 VITALS
HEART RATE: 74 BPM | HEIGHT: 62 IN | BODY MASS INDEX: 22.82 KG/M2 | SYSTOLIC BLOOD PRESSURE: 101 MMHG | RESPIRATION RATE: 20 BRPM | WEIGHT: 124 LBS | TEMPERATURE: 97.9 F | OXYGEN SATURATION: 100 % | DIASTOLIC BLOOD PRESSURE: 67 MMHG

## 2024-12-03 DIAGNOSIS — J32.0 RIGHT MAXILLARY SINUSITIS: Primary | ICD-10-CM

## 2024-12-03 DIAGNOSIS — R11.2 NAUSEA AND VOMITING, UNSPECIFIED VOMITING TYPE: ICD-10-CM

## 2024-12-03 DIAGNOSIS — G43.909 MIGRAINE WITHOUT STATUS MIGRAINOSUS, NOT INTRACTABLE, UNSPECIFIED MIGRAINE TYPE: ICD-10-CM

## 2024-12-03 PROCEDURE — 99214 OFFICE O/P EST MOD 30 MIN: CPT | Performed by: PHYSICIAN ASSISTANT

## 2024-12-03 RX ORDER — ONDANSETRON 4 MG/1
4 TABLET, ORALLY DISINTEGRATING ORAL EVERY 8 HOURS PRN
Qty: 8 TABLET | Refills: 0 | Status: SHIPPED | OUTPATIENT
Start: 2024-12-03

## 2024-12-03 RX ORDER — FLUTICASONE PROPIONATE 50 MCG
1 SPRAY, SUSPENSION (ML) NASAL DAILY
Qty: 16 G | Refills: 0 | Status: SHIPPED | OUTPATIENT
Start: 2024-12-03

## 2024-12-03 RX ORDER — DOXYCYCLINE 100 MG/1
100 CAPSULE ORAL 2 TIMES DAILY
Qty: 20 CAPSULE | Refills: 0 | Status: SHIPPED | OUTPATIENT
Start: 2024-12-03

## 2024-12-03 ASSESSMENT — PAIN SCALES - GENERAL: PAINLEVEL_OUTOF10: NO PAIN (0)

## 2024-12-31 ENCOUNTER — OFFICE VISIT (OUTPATIENT)
Dept: FAMILY MEDICINE | Facility: OTHER | Age: 34
End: 2024-12-31
Payer: COMMERCIAL

## 2024-12-31 VITALS
OXYGEN SATURATION: 99 % | BODY MASS INDEX: 23.65 KG/M2 | DIASTOLIC BLOOD PRESSURE: 62 MMHG | SYSTOLIC BLOOD PRESSURE: 108 MMHG | TEMPERATURE: 97 F | HEART RATE: 69 BPM | HEIGHT: 62 IN | WEIGHT: 128.5 LBS

## 2024-12-31 DIAGNOSIS — J01.01 RECURRENT MAXILLARY SINUSITIS: Primary | ICD-10-CM

## 2024-12-31 PROCEDURE — 99213 OFFICE O/P EST LOW 20 MIN: CPT

## 2024-12-31 ASSESSMENT — PAIN SCALES - GENERAL: PAINLEVEL_OUTOF10: NO PAIN (0)

## 2024-12-31 NOTE — PROGRESS NOTES
Assessment & Plan     Recurrent maxillary sinusitis  Patient is a 34 year-old female with CHARMAINE, PTSD, and ADHD presenting with concerns of 5 days of right maxillary sinus pressure and fullness. Low suspicion for bacterial process given duration of symptoms and recent 10 day treatment for ARBS with BID doxycycline on 12/3/24. Discussed supportive management including as needed acetaminophen/ibuprofen, sterile saline rinses, topical nasal corticosteroids, and warm facial compresses. Ordered CT sinus to determine if lingering infection present, will consider antibiotics at that time along with possible ENT referral. Follow-up if symptoms fail to improve despite above interventions. Patient understands and is agreeable to plan as discussed in clinic.  - CT Sinus w/o Contrast; Future    Subjective   Stacie is a 34 year old, presenting for the following health issues:  Sinus infection       12/31/2024     9:22 AM   Additional Questions   Roomed by Elizabeth STACK   Accompanied by Self     History of Present Illness       Reason for visit:  Sinus pressure and pain    She eats 2-3 servings of fruits and vegetables daily.She consumes 1 sweetened beverage(s) daily.She exercises with enough effort to increase her heart rate 30 to 60 minutes per day.  She exercises with enough effort to increase her heart rate 5 days per week.   She is taking medications regularly.     Acute Illness  Acute illness concerns: sinus pressure     Onset/Duration: 5days   Symptoms:  Fever: No  Chills/Sweats: No  Headache (location?): YES  Sinus Pressure: YES  Conjunctivitis:  No  Ear Pain: no  Rhinorrhea: No  Congestion: YES  Sore Throat: No  Cough: no  Wheeze: No  Decreased Appetite: No  Nausea: YES  Vomiting: No  Diarrhea: No  Dysuria/Freq.: No  Dysuria or Hematuria: No  Fatigue/Achiness: YES- in the face  Sick/Strep Exposure: No    Presents with concerns of right maxillary sinus pressure and congestion for the past 5 days. Associated facial tenderness  "and headache. Diagnosed with bacterial sinus infection on 12/3 and prescribed 10 day course of BID doxycycline. Reports that sinus symptoms improved while on and following antibiotic. Not currently using saline rinse or Flonase.     Denies fever, chills, dental pain, rhinorrhea, sore throat, or other systemic symptoms.         Objective    /62   Pulse 69   Temp 97  F (36.1  C) (Temporal)   Ht 1.562 m (5' 1.5\")   Wt 58.3 kg (128 lb 8 oz)   LMP 11/14/2024 (Exact Date)   SpO2 99%   BMI 23.89 kg/m    Body mass index is 23.89 kg/m .  Physical Exam  Vitals reviewed.   Constitutional:       General: She is not in acute distress.     Appearance: Normal appearance. She is not ill-appearing.   HENT:      Head: Normocephalic and atraumatic.      Right Ear: Tympanic membrane, ear canal and external ear normal. Tympanic membrane is not erythematous, retracted or bulging.      Left Ear: Tympanic membrane, ear canal and external ear normal. Tympanic membrane is not erythematous, retracted or bulging.      Nose: Congestion present. No rhinorrhea.      Right Sinus: No maxillary sinus tenderness or frontal sinus tenderness.      Left Sinus: Maxillary sinus tenderness present. No frontal sinus tenderness.      Mouth/Throat:      Mouth: Mucous membranes are moist. No oral lesions.      Pharynx: Oropharynx is clear. No oropharyngeal exudate, posterior oropharyngeal erythema or postnasal drip.   Eyes:      Conjunctiva/sclera: Conjunctivae normal.      Right eye: Right conjunctiva is not injected. No exudate.     Left eye: Left conjunctiva is not injected. No exudate.     Pupils: Pupils are equal, round, and reactive to light.   Cardiovascular:      Rate and Rhythm: Normal rate and regular rhythm.      Heart sounds: Normal heart sounds, S1 normal and S2 normal. No murmur heard.  Pulmonary:      Effort: Pulmonary effort is normal. No respiratory distress.      Breath sounds: Normal breath sounds. No wheezing.      Comments: " Negative for adventitious breath sounds in all lung fields.  Lymphadenopathy:      Cervical: No cervical adenopathy.   Skin:     General: Skin is warm and dry.      Capillary Refill: Capillary refill takes less than 2 seconds.      Findings: No lesion or rash.   Neurological:      Mental Status: She is alert.   Psychiatric:         Attention and Perception: Attention and perception normal.         Mood and Affect: Mood and affect normal.        CT pending.         Signed Electronically by: STEPHANIE Monreal CNP

## 2024-12-31 NOTE — PATIENT INSTRUCTIONS
"As symptoms have only been going on for 5 days, I have a low suspicion for a bacterial process. We need to complete a CT scan of your sinuses to determine if there is a lingering infection present. Please call [phone] to schedule your imaging study.     Symptom Treatment:  - Rest and Hydration: Make sure to get plenty of rest to help your body fight off the infection.  Drink lots of fluids like water, herbal tea, and clear broth to stay hydrated.    - Pain Relief: Over-the-counter pain relievers such as acetaminophen (Tylenol) or ibuprofen (Advil, Motrin) can help alleviate headache or facial pain associated with sinusitis.  You can take 1000 mg of tylenol up to three times daily, as long as another provider has not restricted you from taking this type of medication.  You can take 400-600 mg of Ibuprofen up to three times daily with food, as long as another provider has restricted you from taking this type of medication.    - Warm Compresses: Apply warm compresses to your face to help ease sinus pain and pressure.  Simply soak a clean towel in warm water, wring out the excess water, and place it over your face for a few minutes at a time.      Infection Treatment:  - Nasal Saline Irrigation: Use a saline nasal rinse, or nasal spray to help clear out mucus and soothe your nasal passages.  Follow the instructions provided with the saline rinse or spray for proper use. Make sure to use distilled water from the store, not tap water.            - Nasal Corticosteroids: I recommend using a nasal corticosteroid spray, such as Flonase or Nasacort, to reduce inflammation in your nasal passages and improve breathing and drainage.  Use the nasal spray once daily (1-2 sprays into each nostril).   It is best to do a saline rinse just prior to using the nasal spray.  Shake and \"prime\" the bottle first making sure a nice spray comes out prior to use.  Aim back into the sinuses, not just straight up your nose. Also aim a " little away from the center (septum) of your nose.   Breath in slightly (but not excessively) with each spray. When completed breath out through the mouth  Repeat on the other side.  Wipe of the nozzle with a clean tissue when complete and cover with the manufactures cap.    Store in a room temperature area out of the light.  Don't share sprays with friends and family.    A side effect of most nasal sprays includes nose bleeds.  Be patient as it may take a few days to start working.

## 2025-01-24 ENCOUNTER — HOSPITAL ENCOUNTER (OUTPATIENT)
Dept: CT IMAGING | Facility: CLINIC | Age: 35
Discharge: HOME | End: 2025-01-24
Payer: COMMERCIAL

## 2025-01-24 DIAGNOSIS — J01.01 RECURRENT MAXILLARY SINUSITIS: ICD-10-CM

## 2025-01-24 PROCEDURE — 70486 CT MAXILLOFACIAL W/O DYE: CPT

## 2025-01-29 ENCOUNTER — MYC MEDICAL ADVICE (OUTPATIENT)
Dept: FAMILY MEDICINE | Facility: OTHER | Age: 35
End: 2025-01-29
Payer: COMMERCIAL

## 2025-01-29 DIAGNOSIS — J01.01 RECURRENT MAXILLARY SINUSITIS: Primary | ICD-10-CM

## 2025-03-03 ENCOUNTER — OFFICE VISIT (OUTPATIENT)
Dept: FAMILY MEDICINE | Facility: OTHER | Age: 35
End: 2025-03-03

## 2025-03-03 ENCOUNTER — ANCILLARY PROCEDURE (OUTPATIENT)
Dept: GENERAL RADIOLOGY | Facility: OTHER | Age: 35
End: 2025-03-03
Attending: PHYSICIAN ASSISTANT

## 2025-03-03 VITALS
DIASTOLIC BLOOD PRESSURE: 68 MMHG | OXYGEN SATURATION: 99 % | SYSTOLIC BLOOD PRESSURE: 110 MMHG | BODY MASS INDEX: 23.55 KG/M2 | HEIGHT: 62 IN | HEART RATE: 72 BPM | WEIGHT: 128 LBS | TEMPERATURE: 98.3 F | RESPIRATION RATE: 20 BRPM

## 2025-03-03 DIAGNOSIS — Q79.60 EHLERS-DANLOS SYNDROME: ICD-10-CM

## 2025-03-03 DIAGNOSIS — M24.9 HYPERMOBILITY OF JOINT: ICD-10-CM

## 2025-03-03 DIAGNOSIS — G43.909 MIGRAINE WITHOUT STATUS MIGRAINOSUS, NOT INTRACTABLE, UNSPECIFIED MIGRAINE TYPE: ICD-10-CM

## 2025-03-03 DIAGNOSIS — F41.9 ANXIETY: ICD-10-CM

## 2025-03-03 DIAGNOSIS — G89.29 CHRONIC LEFT SHOULDER PAIN: ICD-10-CM

## 2025-03-03 DIAGNOSIS — M79.675 GREAT TOE PAIN, LEFT: ICD-10-CM

## 2025-03-03 DIAGNOSIS — M79.675 GREAT TOE PAIN, LEFT: Primary | ICD-10-CM

## 2025-03-03 DIAGNOSIS — M25.512 CHRONIC LEFT SHOULDER PAIN: ICD-10-CM

## 2025-03-03 PROCEDURE — 99214 OFFICE O/P EST MOD 30 MIN: CPT | Performed by: PHYSICIAN ASSISTANT

## 2025-03-03 PROCEDURE — 73660 X-RAY EXAM OF TOE(S): CPT | Mod: TC | Performed by: RADIOLOGY

## 2025-03-03 RX ORDER — HYDROXYZINE PAMOATE 50 MG/1
50 CAPSULE ORAL
Qty: 90 CAPSULE | Refills: 0 | Status: SHIPPED | OUTPATIENT
Start: 2025-03-03

## 2025-03-03 RX ORDER — BUSPIRONE HYDROCHLORIDE 10 MG/1
10 TABLET ORAL AT BEDTIME
Qty: 90 TABLET | Refills: 0 | Status: SHIPPED | OUTPATIENT
Start: 2025-03-03

## 2025-03-03 RX ORDER — ONDANSETRON 4 MG/1
4 TABLET, ORALLY DISINTEGRATING ORAL EVERY 8 HOURS PRN
Qty: 8 TABLET | Refills: 0 | Status: SHIPPED | OUTPATIENT
Start: 2025-03-03

## 2025-03-03 ASSESSMENT — PAIN SCALES - GENERAL: PAINLEVEL_OUTOF10: MODERATE PAIN (5)

## 2025-03-03 NOTE — PROGRESS NOTES
Assessment & Plan     Anxiety  Refilled medications but no further refills without an office visit and establishment of care with myself.  Strongly recommended that she choose a primary care provider that she can establish care with 2 sure good quality care going forward.  - busPIRone (BUSPAR) 10 MG tablet; Take 1 tablet (10 mg) by mouth at bedtime.  - hydrOXYzine (VISTARIL) 50 MG capsule; Take 1 capsule (50 mg) by mouth nightly as needed for other (insomnia).  - Adult Genetics & Metabolism  Referral; Future  - Adult Rheumatology  Referral; Future  - Internal Medicine Referral; Future    Migraine without status migrainosus, not intractable, unspecified migraine type  Stable at this point in time with no new concerns.  Follow-up as needed.  No refills without an office visit to establish care.  - ondansetron (ZOFRAN ODT) 4 MG ODT tab; Take 1 tablet (4 mg) by mouth every 8 hours as needed for nausea.    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 To see whomever she can when she needs extra help.  Advised that she needs to establish care with a primary care provider of her choice to be the  to get her into see specialist and either prove or disprove this syndrome that she reports she was told she had from physical therapy.  Continuity of care is going to be very important for ongoing quality of care.  I stressed this to her today.  - Adult Eye  Referral; Future  - XR Toe Left G/E 2 Views; Future  - Adult Genetics & Metabolism  Referral; Future  - Adult Rheumatology  Referral; Future  - Internal Medicine Referral;       Subjective   Stacie is a 35 year old, presenting for the following health issues:  Musculoskeletal Problem (Left foot big toe)      3/3/2025     3:48 PM   Additional Questions   Roomed by moi  "    Musculoskeletal Problem    History of Present Illness       Reason for visit:  Hurt left big toe a few weeks ago still having pain  Symptom onset:  3-4 weeks ago  Symptoms include:  Pain in left big toe walking  Symptom intensity:  Moderate  Symptom progression:  Worsening  Had these symptoms before:  No  What makes it worse:  Walking , stairs , bending toe  What makes it better:  Rest   She is taking medications regularly.            Review of Systems  Constitutional, HEENT, cardiovascular, pulmonary, GI, , musculoskeletal, neuro, skin, endocrine and psych systems are negative, except as otherwise noted.      Objective    /68 (BP Location: Right arm, Cuff Size: Adult Regular)   Pulse 72   Temp 98.3  F (36.8  C) (Temporal)   Resp 20   Ht 1.575 m (5' 2\")   Wt 58.1 kg (128 lb)   LMP  (LMP Unknown)   SpO2 99%   BMI 23.41 kg/m    Body mass index is 23.41 kg/m .  Physical Exam   GENERAL: alert and no distress  NECK: no adenopathy, no asymmetry, masses, or scars  RESP: lungs clear to auscultation - no rales, rhonchi or wheezes  CV: regular rate and rhythm, normal S1 S2, no S3 or S4, no murmur, click or rub, no peripheral edema  ABDOMEN: soft, nontender, no hepatosplenomegaly, no masses and bowel sounds normal  MS: no gross musculoskeletal defects noted, no edema, Pain with range of motion to the left great toe.  Left shoulder discomfort noted and hypermobility of the joint is displayed.    X-ray: negative for concerning pathology to my independent review today.    It will be overread by radiology.    No results found for this or any previous visit (from the past 24 hours).        Signed Electronically by: Robbin Spicer PA-C    "

## 2025-03-06 ENCOUNTER — TELEPHONE (OUTPATIENT)
Dept: CONSULT | Facility: CLINIC | Age: 35
End: 2025-03-06

## 2025-04-09 NOTE — NURSING NOTE
"Initial /75 (BP Location: Left arm, Patient Position: Chair, Cuff Size: Adult Regular)  Pulse 100  Temp 98  F (36.7  C) (Tympanic)  Resp 18  Ht 5' 2\" (1.575 m)  Wt 164 lb 6.4 oz (74.6 kg)  LMP  (LMP Unknown)  Breastfeeding? No  BMI 30.07 kg/m2 Estimated body mass index is 30.07 kg/(m^2) as calculated from the following:    Height as of this encounter: 5' 2\" (1.575 m).    Weight as of this encounter: 164 lb 6.4 oz (74.6 kg). .    Dottie Major CMA    "
(1) More than 48 hours/None

## 2025-04-19 ENCOUNTER — HEALTH MAINTENANCE LETTER (OUTPATIENT)
Age: 35
End: 2025-04-19

## (undated) DEVICE — GOWN LG DISP 9515

## (undated) DEVICE — SYR BULB IRRIG DOVER 60 ML LATEX FREE 67000

## (undated) DEVICE — GLOVE PROTEXIS MICRO 7.0  2D73PM70

## (undated) DEVICE — ESU LIGASURE OPEN SEALER/DIVIDER SM JAW 16.5MM LF1212A

## (undated) DEVICE — GLOVE PROTEXIS MICRO 5.5  2D73PM55

## (undated) DEVICE — GLOVE PROTEXIS BLUE W/NEU-THERA 7.0  2D73EB70

## (undated) DEVICE — SU WND CLOSURE VLOC 90 ABS 4-0 18" P-12 VLOCM0023

## (undated) DEVICE — SOL WATER IRRIG 1000ML BOTTLE 07139-09

## (undated) DEVICE — SU VICRYL 0 UR-6 27" J603H

## (undated) DEVICE — BLADE KNIFE SURG 15 371115

## (undated) DEVICE — DECANTER VIAL 2006S

## (undated) DEVICE — GLOVE PROTEXIS BLUE W/NEU-THERA 6.0  2D73EB60

## (undated) DEVICE — PREP CHLORAPREP 26ML TINTED ORANGE  260815

## (undated) DEVICE — PACK LAPAROTOMY CUSTOM LAKES

## (undated) DEVICE — Device

## (undated) DEVICE — SOL NACL 0.9% IRRIG 1000ML BOTTLE 07138-09

## (undated) DEVICE — ADHESIVE SWIFTSET 0.8ML OCTYL SS6

## (undated) RX ORDER — ONDANSETRON 2 MG/ML
INJECTION INTRAMUSCULAR; INTRAVENOUS
Status: DISPENSED
Start: 2018-12-22

## (undated) RX ORDER — LIDOCAINE HYDROCHLORIDE 10 MG/ML
INJECTION, SOLUTION EPIDURAL; INFILTRATION; INTRACAUDAL; PERINEURAL
Status: DISPENSED
Start: 2018-12-22

## (undated) RX ORDER — GLYCOPYRROLATE 0.2 MG/ML
INJECTION, SOLUTION INTRAMUSCULAR; INTRAVENOUS
Status: DISPENSED
Start: 2018-12-22

## (undated) RX ORDER — KETOROLAC TROMETHAMINE 30 MG/ML
INJECTION, SOLUTION INTRAMUSCULAR; INTRAVENOUS
Status: DISPENSED
Start: 2018-12-22

## (undated) RX ORDER — BUPIVACAINE HYDROCHLORIDE AND EPINEPHRINE 2.5; 5 MG/ML; UG/ML
INJECTION, SOLUTION EPIDURAL; INFILTRATION; INTRACAUDAL; PERINEURAL
Status: DISPENSED
Start: 2018-12-22

## (undated) RX ORDER — DEXAMETHASONE SODIUM PHOSPHATE 4 MG/ML
INJECTION, SOLUTION INTRA-ARTICULAR; INTRALESIONAL; INTRAMUSCULAR; INTRAVENOUS; SOFT TISSUE
Status: DISPENSED
Start: 2018-12-22

## (undated) RX ORDER — FENTANYL CITRATE 50 UG/ML
INJECTION, SOLUTION INTRAMUSCULAR; INTRAVENOUS
Status: DISPENSED
Start: 2018-12-22